# Patient Record
Sex: FEMALE | Race: OTHER | HISPANIC OR LATINO
[De-identification: names, ages, dates, MRNs, and addresses within clinical notes are randomized per-mention and may not be internally consistent; named-entity substitution may affect disease eponyms.]

---

## 2021-04-14 ENCOUNTER — TRANSCRIPTION ENCOUNTER (OUTPATIENT)
Age: 71
End: 2021-04-14

## 2021-04-17 ENCOUNTER — INPATIENT (INPATIENT)
Facility: HOSPITAL | Age: 71
LOS: 15 days | Discharge: HOME HEALTH | End: 2021-05-03
Attending: INTERNAL MEDICINE | Admitting: INTERNAL MEDICINE
Payer: COMMERCIAL

## 2021-04-17 VITALS
WEIGHT: 149.91 LBS | SYSTOLIC BLOOD PRESSURE: 116 MMHG | OXYGEN SATURATION: 96 % | TEMPERATURE: 97 F | HEIGHT: 64 IN | RESPIRATION RATE: 25 BRPM | DIASTOLIC BLOOD PRESSURE: 63 MMHG | HEART RATE: 90 BPM

## 2021-04-17 DIAGNOSIS — U07.1 COVID-19: ICD-10-CM

## 2021-04-17 DIAGNOSIS — R09.02 HYPOXEMIA: ICD-10-CM

## 2021-04-17 LAB
ALBUMIN SERPL ELPH-MCNC: 2.6 G/DL — LOW (ref 3.3–5)
ALBUMIN SERPL ELPH-MCNC: 2.6 G/DL — LOW (ref 3.3–5)
ALP SERPL-CCNC: 85 U/L — SIGNIFICANT CHANGE UP (ref 40–120)
ALP SERPL-CCNC: 88 U/L — SIGNIFICANT CHANGE UP (ref 40–120)
ALT FLD-CCNC: 24 U/L — SIGNIFICANT CHANGE UP (ref 12–78)
ALT FLD-CCNC: 26 U/L — SIGNIFICANT CHANGE UP (ref 12–78)
ANION GAP SERPL CALC-SCNC: 8 MMOL/L — SIGNIFICANT CHANGE UP (ref 5–17)
APTT BLD: 33.2 SEC — SIGNIFICANT CHANGE UP (ref 27.5–35.5)
AST SERPL-CCNC: 32 U/L — SIGNIFICANT CHANGE UP (ref 15–37)
AST SERPL-CCNC: 47 U/L — HIGH (ref 15–37)
BASOPHILS # BLD AUTO: 0.03 K/UL — SIGNIFICANT CHANGE UP (ref 0–0.2)
BASOPHILS NFR BLD AUTO: 0.4 % — SIGNIFICANT CHANGE UP (ref 0–2)
BILIRUB DIRECT SERPL-MCNC: 0.21 MG/DL — HIGH (ref 0.05–0.2)
BILIRUB INDIRECT FLD-MCNC: 0.3 MG/DL — SIGNIFICANT CHANGE UP (ref 0.2–1)
BILIRUB SERPL-MCNC: 0.5 MG/DL — SIGNIFICANT CHANGE UP (ref 0.2–1.2)
BILIRUB SERPL-MCNC: 0.5 MG/DL — SIGNIFICANT CHANGE UP (ref 0.2–1.2)
BUN SERPL-MCNC: 10 MG/DL — SIGNIFICANT CHANGE UP (ref 7–23)
CALCIUM SERPL-MCNC: 8.3 MG/DL — LOW (ref 8.5–10.1)
CHLORIDE SERPL-SCNC: 100 MMOL/L — SIGNIFICANT CHANGE UP (ref 96–108)
CK SERPL-CCNC: 66 U/L — SIGNIFICANT CHANGE UP (ref 26–192)
CK SERPL-CCNC: 89 U/L — SIGNIFICANT CHANGE UP (ref 26–192)
CO2 SERPL-SCNC: 27 MMOL/L — SIGNIFICANT CHANGE UP (ref 22–31)
CREAT SERPL-MCNC: 0.68 MG/DL — SIGNIFICANT CHANGE UP (ref 0.5–1.3)
CREAT SERPL-MCNC: 0.79 MG/DL — SIGNIFICANT CHANGE UP (ref 0.5–1.3)
D DIMER BLD IA.RAPID-MCNC: 222 NG/ML DDU — SIGNIFICANT CHANGE UP
EOSINOPHIL # BLD AUTO: 0 K/UL — SIGNIFICANT CHANGE UP (ref 0–0.5)
EOSINOPHIL NFR BLD AUTO: 0 % — SIGNIFICANT CHANGE UP (ref 0–6)
FIBRINOGEN PPP-MCNC: 882 MG/DL — HIGH (ref 290–520)
FLUAV AG NPH QL: SIGNIFICANT CHANGE UP
FLUBV AG NPH QL: SIGNIFICANT CHANGE UP
GLUCOSE SERPL-MCNC: 259 MG/DL — HIGH (ref 70–99)
HCT VFR BLD CALC: 34.1 % — LOW (ref 34.5–45)
HGB BLD-MCNC: 12 G/DL — SIGNIFICANT CHANGE UP (ref 11.5–15.5)
IMM GRANULOCYTES NFR BLD AUTO: 0.4 % — SIGNIFICANT CHANGE UP (ref 0–1.5)
INR BLD: 1.16 RATIO — SIGNIFICANT CHANGE UP (ref 0.88–1.16)
LACTATE SERPL-SCNC: 1.2 MMOL/L — SIGNIFICANT CHANGE UP (ref 0.7–2)
LACTATE SERPL-SCNC: 1.3 MMOL/L — SIGNIFICANT CHANGE UP (ref 0.7–2)
LYMPHOCYTES # BLD AUTO: 1.07 K/UL — SIGNIFICANT CHANGE UP (ref 1–3.3)
LYMPHOCYTES # BLD AUTO: 13 % — SIGNIFICANT CHANGE UP (ref 13–44)
MAGNESIUM SERPL-MCNC: 1.7 MG/DL — SIGNIFICANT CHANGE UP (ref 1.6–2.6)
MCHC RBC-ENTMCNC: 30.5 PG — SIGNIFICANT CHANGE UP (ref 27–34)
MCHC RBC-ENTMCNC: 35.2 GM/DL — SIGNIFICANT CHANGE UP (ref 32–36)
MCV RBC AUTO: 86.5 FL — SIGNIFICANT CHANGE UP (ref 80–100)
MONOCYTES # BLD AUTO: 0.67 K/UL — SIGNIFICANT CHANGE UP (ref 0–0.9)
MONOCYTES NFR BLD AUTO: 8.1 % — SIGNIFICANT CHANGE UP (ref 2–14)
NEUTROPHILS # BLD AUTO: 6.43 K/UL — SIGNIFICANT CHANGE UP (ref 1.8–7.4)
NEUTROPHILS NFR BLD AUTO: 78.1 % — HIGH (ref 43–77)
NRBC # BLD: 0 /100 WBCS — SIGNIFICANT CHANGE UP (ref 0–0)
NT-PROBNP SERPL-SCNC: 891 PG/ML — HIGH (ref 0–125)
PHOSPHATE SERPL-MCNC: 2.3 MG/DL — LOW (ref 2.5–4.5)
PLATELET # BLD AUTO: 216 K/UL — SIGNIFICANT CHANGE UP (ref 150–400)
POTASSIUM SERPL-MCNC: 4.1 MMOL/L — SIGNIFICANT CHANGE UP (ref 3.5–5.3)
POTASSIUM SERPL-SCNC: 4.1 MMOL/L — SIGNIFICANT CHANGE UP (ref 3.5–5.3)
PROT SERPL-MCNC: 6.7 GM/DL — SIGNIFICANT CHANGE UP (ref 6–8.3)
PROT SERPL-MCNC: 6.9 GM/DL — SIGNIFICANT CHANGE UP (ref 6–8.3)
PROTHROM AB SERPL-ACNC: 13.4 SEC — SIGNIFICANT CHANGE UP (ref 10.6–13.6)
RBC # BLD: 3.94 M/UL — SIGNIFICANT CHANGE UP (ref 3.8–5.2)
RBC # FLD: 11.6 % — SIGNIFICANT CHANGE UP (ref 10.3–14.5)
SARS-COV-2 RNA SPEC QL NAA+PROBE: DETECTED
SODIUM SERPL-SCNC: 135 MMOL/L — SIGNIFICANT CHANGE UP (ref 135–145)
TROPONIN I SERPL-MCNC: <.015 NG/ML — SIGNIFICANT CHANGE UP (ref 0.01–0.04)
TROPONIN I SERPL-MCNC: <.015 NG/ML — SIGNIFICANT CHANGE UP (ref 0.01–0.04)
WBC # BLD: 8.23 K/UL — SIGNIFICANT CHANGE UP (ref 3.8–10.5)
WBC # FLD AUTO: 8.23 K/UL — SIGNIFICANT CHANGE UP (ref 3.8–10.5)

## 2021-04-17 PROCEDURE — 71045 X-RAY EXAM CHEST 1 VIEW: CPT | Mod: 26

## 2021-04-17 PROCEDURE — 99285 EMERGENCY DEPT VISIT HI MDM: CPT

## 2021-04-17 PROCEDURE — 93010 ELECTROCARDIOGRAM REPORT: CPT

## 2021-04-17 RX ORDER — MAGNESIUM HYDROXIDE 400 MG/1
30 TABLET, CHEWABLE ORAL DAILY
Refills: 0 | Status: DISCONTINUED | OUTPATIENT
Start: 2021-04-17 | End: 2021-05-03

## 2021-04-17 RX ORDER — ACETAMINOPHEN 500 MG
650 TABLET ORAL EVERY 4 HOURS
Refills: 0 | Status: DISCONTINUED | OUTPATIENT
Start: 2021-04-17 | End: 2021-05-03

## 2021-04-17 RX ORDER — SENNA PLUS 8.6 MG/1
2 TABLET ORAL AT BEDTIME
Refills: 0 | Status: DISCONTINUED | OUTPATIENT
Start: 2021-04-17 | End: 2021-05-03

## 2021-04-17 RX ORDER — REMDESIVIR 5 MG/ML
100 INJECTION INTRAVENOUS EVERY 24 HOURS
Refills: 0 | Status: COMPLETED | OUTPATIENT
Start: 2021-04-18 | End: 2021-04-21

## 2021-04-17 RX ORDER — REMDESIVIR 5 MG/ML
INJECTION INTRAVENOUS
Refills: 0 | Status: COMPLETED | OUTPATIENT
Start: 2021-04-17 | End: 2021-04-21

## 2021-04-17 RX ORDER — DEXAMETHASONE 0.5 MG/5ML
6 ELIXIR ORAL ONCE
Refills: 0 | Status: COMPLETED | OUTPATIENT
Start: 2021-04-17 | End: 2021-04-17

## 2021-04-17 RX ORDER — DEXAMETHASONE 0.5 MG/5ML
6 ELIXIR ORAL DAILY
Refills: 0 | Status: DISCONTINUED | OUTPATIENT
Start: 2021-04-18 | End: 2021-04-19

## 2021-04-17 RX ORDER — ENOXAPARIN SODIUM 100 MG/ML
40 INJECTION SUBCUTANEOUS DAILY
Refills: 0 | Status: DISCONTINUED | OUTPATIENT
Start: 2021-04-17 | End: 2021-04-24

## 2021-04-17 RX ORDER — REMDESIVIR 5 MG/ML
200 INJECTION INTRAVENOUS EVERY 24 HOURS
Refills: 0 | Status: COMPLETED | OUTPATIENT
Start: 2021-04-17 | End: 2021-04-17

## 2021-04-17 RX ADMIN — SENNA PLUS 2 TABLET(S): 8.6 TABLET ORAL at 22:51

## 2021-04-17 RX ADMIN — Medication 6 MILLIGRAM(S): at 19:31

## 2021-04-17 RX ADMIN — REMDESIVIR 500 MILLIGRAM(S): 5 INJECTION INTRAVENOUS at 22:51

## 2021-04-17 RX ADMIN — Medication 650 MILLIGRAM(S): at 19:35

## 2021-04-17 RX ADMIN — Medication 650 MILLIGRAM(S): at 22:31

## 2021-04-17 NOTE — ED PROVIDER NOTE - OBJECTIVE STATEMENT
71 yo F no pmhx, who tested positive for COVID 3 days ago, presenting with complaints of shortness of breath and low oxygen levels noted on pulse oximeter. Patient states her daughter tested positive, unsure which day, and prompted her and her  to get tested. States she has been otherwise asymptomatic until today. Denies cp. No fevers.

## 2021-04-17 NOTE — H&P ADULT - NSHPLABSRESULTS_GEN_ALL_CORE
12.0                 135  | 27   | 10           8.23  >-----------< 216     ------------------------< 259                   34.1                 4.1  | 100  | 0.68                                         Ca 8.3   Mg x     Ph x      PT/INR - ( 17 Apr 2021 17:42 )   PT: 13.4 sec;   INR: 1.16 ratio         PTT - ( 17 Apr 2021 17:42 )  PTT:33.2 sec  CARDIAC MARKERS ( 17 Apr 2021 17:42 )  <.015 ng/mL / x     / 89 U/L / x     / x            d-dimer- normal  Ferritin- pending   chest xray- peripheral opacities  bilaterally more kalyan 1/3 lung affected.  ekg- sinus rhythm

## 2021-04-17 NOTE — ED PROVIDER NOTE - CLINICAL SUMMARY MEDICAL DECISION MAKING FREE TEXT BOX
69 yo F presenting with sob and hypoxia in setting of covid pna, 80->improved to 92% on 3L NC. labs, xr, ekg, decadron, tba

## 2021-04-17 NOTE — ED ADULT NURSE NOTE - OBJECTIVE STATEMENT
71 y/o female with PMH of T1DM. Presents to the Ed with c/c of SOB. PT + for covid on Wednesday and has been having SOB ever since.

## 2021-04-17 NOTE — ED PROVIDER NOTE - PHYSICAL EXAMINATION
VITALS: reviewed  GEN: NAD, A & O x 4  HEAD/EYES: NCAT, PERRL, EOMI, anicteric sclerae, no conjunctival pallor  ENT: mucus membranes moist, oropharynx WNL, trachea midline, no JVD  RESP: lungs CTA with equal breath sounds bilaterally, tachypneic, chest wall nontender and atraumatic  CV: heart with reg rhythm S1, S2, distal pulses intact and symmetric bilaterally  ABDOMEN: normoactive bowel sounds, soft, nondistended, nontender, no palpable masses  MSK: extremities atraumatic and nontender, no edema, no asymmetry.   SKIN: warm, dry, no rash, no bruising, no cyanosis. color appropriate for ethnicity  NEURO: alert, mentating appropriately, no facial asymmetry.   PSYCH: Affect appropriate

## 2021-04-17 NOTE — H&P ADULT - HISTORY OF PRESENT ILLNESS
71 yo F no pmhx, who tested positive for COVID 3 days ago, presenting with complaints of shortness of breath and low oxygen levels noted on pulse oximeter. Patient states her daughter tested positive, unsure which day, and prompted her and her  to get tested. States she has been otherwise asymptomatic until today. Denies cp. No fevers., however has fever her of 101F in the ED.

## 2021-04-17 NOTE — H&P ADULT - NSHPPHYSICALEXAM_GEN_ALL_CORE
General: A/ox 3, No acute Distress. febrile.  no distress on supplemental o2  skin : Normal  Head. Michelle. No lacerations  Neck: Supple, NO JVD  Cardiac: S1 S2, No M/R/G  Pulmonary: CTAP, Breathing unlabored, No Rhonchi/Rales/Wheezing  Abdomen: Soft, Non -tender, +BS x 4 quads  Neuro: A/o x 3, No focal deficits. Normal Motor and sensory exam  Vascular: Normal distal pulses.  Extremities: . No rashes. No edema  Decubiti ; None

## 2021-04-17 NOTE — CONSULT NOTE ADULT - SUBJECTIVE AND OBJECTIVE BOX
HPI:  71 y/o female with hx of DM, diet- controlled, otherwise in good health, admitted today with c/o increasing SOB and dry cough over the past few days with associated pleuritic- type cp with the coughing.  No c/o fevers or chills and patient had tested Positive for COVID-19 by PCR 3 days PTA after her  was dx'ed with COVID-19 as well.  O2 Sat's were being monitored at home and noted to decrease to 84% today and her niece who is an RN recommended that she come to the hospital for further care.        Allergies :    NKA    Intolerances - none        Social History:  Tobacco: negative  Alcohol: negative  Recent Travel: none  No Pets  Lives at home with her  and daughter    MEDICATIONS  (STANDING):  dexAMETHasone  Injectable 6 milliGRAM(s) IV Push daily  enoxaparin Injectable 40 milliGRAM(s) SubCutaneous daily  remdesivir  IVPB   IV Intermittent   senna 2 Tablet(s) Oral at bedtime    MEDICATIONS  (PRN):  acetaminophen   Tablet .. 650 milliGRAM(s) Oral every 4 hours PRN Temp greater or equal to 38.5C (101.3F)  magnesium hydroxide Suspension 30 milliLiter(s) Oral daily PRN Constipation      CBC Full  -  ( 17 Apr 2021 17:42 )  WBC Count : 8.23 K/uL  RBC Count : 3.94 M/uL  Hemoglobin : 12.0 g/dL  Hematocrit : 34.1 %  Platelet Count - Automated : 216 K/uL  Mean Cell Volume : 86.5 fl  Mean Cell Hemoglobin : 30.5 pg  Mean Cell Hemoglobin Concentration : 35.2 gm/dL  Auto Neutrophil # : 6.43 K/uL  Auto Lymphocyte # : 1.07 K/uL  Auto Monocyte # : 0.67 K/uL  Auto Eosinophil # : 0.00 K/uL  Auto Basophil # : 0.03 K/uL  Auto Neutrophil % : 78.1 %  Auto Lymphocyte % : 13.0 %  Auto Monocyte % : 8.1 %  Auto Eosinophil % : 0.0 %  Auto Basophil % : 0.4 %    04-17    135  |  100  |  10  ----------------------------<  259<H>  4.1   |  27  |  0.68    Ca    8.3<L>      17 Apr 2021 17:42    TPro  6.9  /  Alb  2.6<L>  /  TBili  0.5  /  DBili  x   /  AST  47<H>  /  ALT  26  /  AlkPhos  85  04-17    LIVER FUNCTIONS - ( 17 Apr 2021 17:42 )  Alb: 2.6 g/dL / Pro: 6.9 gm/dL / ALK PHOS: 85 U/L / ALT: 26 U/L / AST: 47 U/L / GGT: x           PT/INR - ( 17 Apr 2021 17:42 )   PT: 13.4 sec;   INR: 1.16 ratio         PTT - ( 17 Apr 2021 17:42 )  PTT:33.2 sec      CARDIAC MARKERS ( 17 Apr 2021 17:42 )  <.015 ng/mL / x     / 89 U/L / x     / x                  MICROBIOLOGY:                   Radiology:                   Vital Signs Last 24 Hrs  T(C): 38.9 (17 Apr 2021 19:29), Max: 38.9 (17 Apr 2021 19:29)  T(F): 102 (17 Apr 2021 19:29), Max: 102 (17 Apr 2021 19:29)  HR: 85 (17 Apr 2021 19:29) (85 - 90)  BP: 136/58 (17 Apr 2021 19:29) (116/63 - 136/58)  BP(mean): --  RR: 20 (17 Apr 2021 19:29) (20 - 25)  SpO2: 98% (17 Apr 2021 19:29) (96% - 98%)  Supplemental O2: on NC O2    PHYSICAL EXAM    General: 71 y/o female awake, alert, coughing frequently and with c/o mid-cp with coughing, using NC O2, appears uncomfortable  HEENT:   Neck:  Heart:  Lungs:   Abdomen: soft, BS+, nontender, no masses or HS-megaly detected  Back: no CVA or Spinal tenderness noted  Extremities: no edema LE's  Skin: warm, dry, no rash noted          I&O's Summary      Impression:    Suggestions:  Will therefore begin rx with Remdesivir and Decadron and follow-up temps and labs closely.  Monitor O2 Sat's.    Maintain Airborne/ Contact Isolation for COVID-19. HPI:  69 y/o female with hx of DM, diet- controlled, otherwise in good health, admitted today with c/o increasing SOB and dry cough over the past few days with associated pleuritic- type cp with the coughing.  No c/o fevers or chills and patient had tested Positive for COVID-19 by PCR 3 days PTA after her  was dx'ed with COVID-19 as well.  O2 Sat's were being monitored at home and noted to decrease to 84% today and her niece who is an RN recommended that she come to the hospital for further care.  In the ER patient was begun on NC O2 and CXR was done and results are pending.          Allergies :    NKA    Intolerances - none        Social History:  Tobacco: negative  Alcohol: negative  Recent Travel: none  No Pets  Lives at home with her  and daughter  Patient was born in [place] and came to the [place] when she was 5 y/o      MEDICATIONS  (STANDING):  dexAMETHasone  Injectable 6 milliGRAM(s) IV Push daily  enoxaparin Injectable 40 milliGRAM(s) SubCutaneous daily  remdesivir  IVPB   IV Intermittent   senna 2 Tablet(s) Oral at bedtime    MEDICATIONS  (PRN):  acetaminophen   Tablet .. 650 milliGRAM(s) Oral every 4 hours PRN Temp greater or equal to 38.5C (101.3F)  magnesium hydroxide Suspension 30 milliLiter(s) Oral daily PRN Constipation      LABS:  CBC Full  -  ( 17 Apr 2021 17:42 )  WBC Count : 8.23 K/uL  RBC Count : 3.94 M/uL  Hemoglobin : 12.0 g/dL  Hematocrit : 34.1 %  Platelet Count - Automated : 216 K/uL  Mean Cell Volume : 86.5 fl  Mean Cell Hemoglobin : 30.5 pg  Mean Cell Hemoglobin Concentration : 35.2 gm/dL  Auto Neutrophil # : 6.43 K/uL  Auto Lymphocyte # : 1.07 K/uL  Auto Monocyte # : 0.67 K/uL  Auto Eosinophil # : 0.00 K/uL  Auto Basophil # : 0.03 K/uL  Auto Neutrophil % : 78.1 %  Auto Lymphocyte % : 13.0 %  Auto Monocyte % : 8.1 %  Auto Eosinophil % : 0.0 %  Auto Basophil % : 0.4 %    04-17    135  |  100  |  10  ----------------------------<  259<H>  4.1   |  27  |  0.68    Ca    8.3<L>      17 Apr 2021 17:42    TPro  6.9  /  Alb  2.6<L>  /  TBili  0.5  /  DBili  x   /  AST  47<H>  /  ALT  26  /  AlkPhos  85  04-17    LIVER FUNCTIONS - ( 17 Apr 2021 17:42 )  Alb: 2.6 g/dL / Pro: 6.9 gm/dL / ALK PHOS: 85 U/L / ALT: 26 U/L / AST: 47 U/L / GGT: x           PT/INR - ( 17 Apr 2021 17:42 )   PT: 13.4 sec;   INR: 1.16 ratio       PTT - ( 17 Apr 2021 17:42 )  PTT:33.2 sec    CARDIAC MARKERS ( 17 Apr 2021 17:42 )  <.015 ng/mL / x     / 89 U/L / x     / x            MICROBIOLOGY:    Pending         Radiology:    CXR done - results pending       Vital Signs Last 24 Hrs  T(C): 38.9 (17 Apr 2021 19:29), Max: 38.9 (17 Apr 2021 19:29)  T(F): 102 (17 Apr 2021 19:29), Max: 102 (17 Apr 2021 19:29)  HR: 85 (17 Apr 2021 19:29) (85 - 90)  BP: 136/58 (17 Apr 2021 19:29) (116/63 - 136/58)  BP(mean): --  RR: 20 (17 Apr 2021 19:29) (20 - 25)  SpO2: 98% (17 Apr 2021 19:29) (96% - 98%)  Supplemental O2: on NC O2    PHYSICAL EXAM    General: 69 y/o female awake, alert, coughing frequently and with c/o mid-cp with coughing, using NC O2, appears uncomfortable but in NAD at present  HEENT: conj pink, sclerae anicteric, PERRLA, no oral lesions noted but mucosa slightly dry  Neck: supple, no nodes noted  Heart:  RR  Lungs: decreased BS throughout and with scattered wheeze noted bilaterally  Abdomen: soft, BS+, nontender, no masses or HS-megaly detected  Back: no CVA or Spinal tenderness noted  Extremities: no edema LE's  Skin: warm, dry, no rash noted      I&O's Summary :      Impression:       Suggestions:  Will therefore begin rx with Remdesivir and Decadron and follow-up temps and labs closely.  Monitor O2 Sat's.    Maintain Airborne/ Contact Isolation for COVID-19. HPI:  71 y/o female with hx of DM, diet- controlled, otherwise in good health, admitted today with c/o increasing SOB and dry cough over the past few days with associated pleuritic- type cp with the coughing.  No c/o fevers or chills and patient had tested Positive for COVID-19 by PCR 3 days PTA after her  was dx'ed with COVID-19 as well.  O2 Sat's were being monitored at home and noted to decrease to 84% today and her niece who is an RN recommended that she come to the hospital for further care.  In the ER patient was begun on NC O2 and CXR was done and results are pending.  Patient found to be afebrile in the ER and have WBC's WNL but with elevated Glucose.  COVID-19 PCR obtained and patient was begun on Decadron and Remdesivir.      Allergies :    NKA    Intolerances - none      Social History:  Tobacco: negative  Alcohol: negative  Recent Travel: none  No Pets  Lives at home with her  and daughter  Patient was born in [place] and came [place] the [place] when she was 3 y/o      MEDICATIONS  (STANDING):  dexAMETHasone  Injectable 6 milliGRAM(s) IV Push daily  enoxaparin Injectable 40 milliGRAM(s) SubCutaneous daily  remdesivir  IVPB   IV Intermittent   senna 2 Tablet(s) Oral at bedtime    MEDICATIONS  (PRN):  acetaminophen   Tablet .. 650 milliGRAM(s) Oral every 4 hours PRN Temp greater or equal to 38.5C (101.3F)  magnesium hydroxide Suspension 30 milliLiter(s) Oral daily PRN Constipation      LABS:  CBC Full  -  ( 17 Apr 2021 17:42 )  WBC Count : 8.23 K/uL  RBC Count : 3.94 M/uL  Hemoglobin : 12.0 g/dL  Hematocrit : 34.1 %  Platelet Count - Automated : 216 K/uL  Mean Cell Volume : 86.5 fl  Mean Cell Hemoglobin : 30.5 pg  Mean Cell Hemoglobin Concentration : 35.2 gm/dL  Auto Neutrophil # : 6.43 K/uL  Auto Lymphocyte # : 1.07 K/uL  Auto Monocyte # : 0.67 K/uL  Auto Eosinophil # : 0.00 K/uL  Auto Basophil # : 0.03 K/uL  Auto Neutrophil % : 78.1 %  Auto Lymphocyte % : 13.0 %  Auto Monocyte % : 8.1 %  Auto Eosinophil % : 0.0 %  Auto Basophil % : 0.4 %    04-17    135  |  100  |  10  ----------------------------<  259<H>  4.1   |  27  |  0.68    Ca    8.3<L>      17 Apr 2021 17:42    TPro  6.9  /  Alb  2.6<L>  /  TBili  0.5  /  DBili  x   /  AST  47<H>  /  ALT  26  /  AlkPhos  85  04-17    LIVER FUNCTIONS - ( 17 Apr 2021 17:42 )  Alb: 2.6 g/dL / Pro: 6.9 gm/dL / ALK PHOS: 85 U/L / ALT: 26 U/L / AST: 47 U/L / GGT: x           PT/INR - ( 17 Apr 2021 17:42 )   PT: 13.4 sec;   INR: 1.16 ratio       PTT - ( 17 Apr 2021 17:42 )  PTT:33.2 sec    CARDIAC MARKERS ( 17 Apr 2021 17:42 )  <.015 ng/mL / x     / 89 U/L / x     / x            MICROBIOLOGY:    Pending         Radiology:    CXR done - results pending       Vital Signs Last 24 Hrs  T(C): 38.9 (17 Apr 2021 19:29), Max: 38.9 (17 Apr 2021 19:29)  T(F): 102 (17 Apr 2021 19:29), Max: 102 (17 Apr 2021 19:29)  HR: 85 (17 Apr 2021 19:29) (85 - 90)  BP: 136/58 (17 Apr 2021 19:29) (116/63 - 136/58)  BP(mean): --  RR: 20 (17 Apr 2021 19:29) (20 - 25)  SpO2: 98% (17 Apr 2021 19:29) (96% - 98%)  Supplemental O2: on NC O2    PHYSICAL EXAM    General: 71 y/o female awake, alert,  sitting up on the stretcher in the ER now, coughing frequently and with c/o mid- sternal cp with coughing, using NC O2, pleasant and cooperative, but  appears               very uncomfortable and fatigued   HEENT: conj pink, sclerae anicteric, PERRLA, no oral lesions noted but mucosa slightly dry  Neck: supple, no nodes noted  Heart:  RR  Lungs: decreased BS throughout and with scattered wheeze noted bilaterally  Abdomen: soft, BS+, nontender, no masses or HS-megaly detected  Back: no CVA or Spinal tenderness noted  Extremities: no edema LE's  Skin: warm, dry, no rash noted      I&O's Summary :      Impression:  71 y/o female with hx of  DM reportedly diet-controlled but otherwise in good health, admitted today via the ER with c/o worsening SOB and cough associated with pleuritic-type cp over the prior few days after testing positive for COVID-19 by PCR a few days ago and noted by her niece who is an RN to be hypoxic with O2 Sat decreased to 84% at home.  W/u with CXR and labs were obtained along with COVID-19 PCR testing and patient was rx'ed with NC O2 at 3 Liters presently along with orders for Decadron and Remdesivir to begin.      Suggestions:  Will therefore continue rx with Remdesivir and Decadron and follow-up temps and labs closely. Monitor O2 Sat's closely and titrate O2 accordingly. Follow-up CXR results.  Maintain Airborne/ Contact Isolation for COVID-19.  DIscussed in detail with patient at the bedside.  Thanks, will follow with you.

## 2021-04-18 LAB
A1C WITH ESTIMATED AVERAGE GLUCOSE RESULT: 13 % — HIGH (ref 4–5.6)
ALBUMIN SERPL ELPH-MCNC: 2.6 G/DL — LOW (ref 3.3–5)
ALP SERPL-CCNC: 92 U/L — SIGNIFICANT CHANGE UP (ref 40–120)
ALT FLD-CCNC: 29 U/L — SIGNIFICANT CHANGE UP (ref 12–78)
ANION GAP SERPL CALC-SCNC: 8 MMOL/L — SIGNIFICANT CHANGE UP (ref 5–17)
APPEARANCE UR: CLEAR — SIGNIFICANT CHANGE UP
AST SERPL-CCNC: 36 U/L — SIGNIFICANT CHANGE UP (ref 15–37)
BASOPHILS # BLD AUTO: 0.01 K/UL — SIGNIFICANT CHANGE UP (ref 0–0.2)
BASOPHILS NFR BLD AUTO: 0.1 % — SIGNIFICANT CHANGE UP (ref 0–2)
BILIRUB DIRECT SERPL-MCNC: 0.14 MG/DL — SIGNIFICANT CHANGE UP (ref 0.05–0.2)
BILIRUB SERPL-MCNC: 0.3 MG/DL — SIGNIFICANT CHANGE UP (ref 0.2–1.2)
BILIRUB UR-MCNC: NEGATIVE — SIGNIFICANT CHANGE UP
BUN SERPL-MCNC: 17 MG/DL — SIGNIFICANT CHANGE UP (ref 7–23)
CALCIUM SERPL-MCNC: 9.1 MG/DL — SIGNIFICANT CHANGE UP (ref 8.5–10.1)
CHLORIDE SERPL-SCNC: 102 MMOL/L — SIGNIFICANT CHANGE UP (ref 96–108)
CO2 SERPL-SCNC: 28 MMOL/L — SIGNIFICANT CHANGE UP (ref 22–31)
COLOR SPEC: YELLOW — SIGNIFICANT CHANGE UP
COVID-19 SPIKE DOMAIN AB INTERP: NEGATIVE — SIGNIFICANT CHANGE UP
COVID-19 SPIKE DOMAIN ANTIBODY RESULT: 0.4 U/ML — SIGNIFICANT CHANGE UP
CREAT SERPL-MCNC: 0.81 MG/DL — SIGNIFICANT CHANGE UP (ref 0.5–1.3)
CRP SERPL-MCNC: 181 MG/L — HIGH
CRP SERPL-MCNC: 226 MG/L — HIGH
D DIMER BLD IA.RAPID-MCNC: 248 NG/ML DDU — HIGH
DIFF PNL FLD: ABNORMAL
EOSINOPHIL # BLD AUTO: 0 K/UL — SIGNIFICANT CHANGE UP (ref 0–0.5)
EOSINOPHIL NFR BLD AUTO: 0 % — SIGNIFICANT CHANGE UP (ref 0–6)
EPI CELLS # UR: SIGNIFICANT CHANGE UP
ERYTHROCYTE [SEDIMENTATION RATE] IN BLOOD: 97 MM/HR — HIGH (ref 0–20)
ESTIMATED AVERAGE GLUCOSE: 326 MG/DL — HIGH (ref 68–114)
FERRITIN SERPL-MCNC: 612 NG/ML — HIGH (ref 15–150)
GLUCOSE BLDC GLUCOMTR-MCNC: 334 MG/DL — HIGH (ref 70–99)
GLUCOSE BLDC GLUCOMTR-MCNC: 379 MG/DL — HIGH (ref 70–99)
GLUCOSE BLDC GLUCOMTR-MCNC: 458 MG/DL — CRITICAL HIGH (ref 70–99)
GLUCOSE BLDC GLUCOMTR-MCNC: 473 MG/DL — CRITICAL HIGH (ref 70–99)
GLUCOSE SERPL-MCNC: 389 MG/DL — HIGH (ref 70–99)
GLUCOSE UR QL: 1000 MG/DL
HCT VFR BLD CALC: 35.4 % — SIGNIFICANT CHANGE UP (ref 34.5–45)
HCV AB S/CO SERPL IA: 0.09 S/CO — SIGNIFICANT CHANGE UP (ref 0–0.99)
HCV AB SERPL-IMP: SIGNIFICANT CHANGE UP
HGB BLD-MCNC: 12.3 G/DL — SIGNIFICANT CHANGE UP (ref 11.5–15.5)
IMM GRANULOCYTES NFR BLD AUTO: 0.6 % — SIGNIFICANT CHANGE UP (ref 0–1.5)
KETONES UR-MCNC: ABNORMAL
LDH SERPL L TO P-CCNC: 415 U/L — HIGH (ref 50–242)
LDH SERPL L TO P-CCNC: 479 U/L — HIGH (ref 50–242)
LEUKOCYTE ESTERASE UR-ACNC: NEGATIVE — SIGNIFICANT CHANGE UP
LYMPHOCYTES # BLD AUTO: 0.81 K/UL — LOW (ref 1–3.3)
LYMPHOCYTES # BLD AUTO: 12.1 % — LOW (ref 13–44)
MCHC RBC-ENTMCNC: 30.4 PG — SIGNIFICANT CHANGE UP (ref 27–34)
MCHC RBC-ENTMCNC: 34.7 GM/DL — SIGNIFICANT CHANGE UP (ref 32–36)
MCV RBC AUTO: 87.6 FL — SIGNIFICANT CHANGE UP (ref 80–100)
MONOCYTES # BLD AUTO: 0.34 K/UL — SIGNIFICANT CHANGE UP (ref 0–0.9)
MONOCYTES NFR BLD AUTO: 5.1 % — SIGNIFICANT CHANGE UP (ref 2–14)
NEUTROPHILS # BLD AUTO: 5.48 K/UL — SIGNIFICANT CHANGE UP (ref 1.8–7.4)
NEUTROPHILS NFR BLD AUTO: 82.1 % — HIGH (ref 43–77)
NITRITE UR-MCNC: POSITIVE
NRBC # BLD: 0 /100 WBCS — SIGNIFICANT CHANGE UP (ref 0–0)
PH UR: 5 — SIGNIFICANT CHANGE UP (ref 5–8)
PLATELET # BLD AUTO: 254 K/UL — SIGNIFICANT CHANGE UP (ref 150–400)
POTASSIUM SERPL-MCNC: 4 MMOL/L — SIGNIFICANT CHANGE UP (ref 3.5–5.3)
POTASSIUM SERPL-SCNC: 4 MMOL/L — SIGNIFICANT CHANGE UP (ref 3.5–5.3)
PROCALCITONIN SERPL-MCNC: 0.08 NG/ML — SIGNIFICANT CHANGE UP (ref 0.02–0.1)
PROCALCITONIN SERPL-MCNC: 0.1 NG/ML — SIGNIFICANT CHANGE UP (ref 0.02–0.1)
PROCALCITONIN SERPL-MCNC: 0.11 NG/ML — HIGH (ref 0.02–0.1)
PROT SERPL-MCNC: 7.1 GM/DL — SIGNIFICANT CHANGE UP (ref 6–8.3)
PROT UR-MCNC: 15 MG/DL
RBC # BLD: 4.04 M/UL — SIGNIFICANT CHANGE UP (ref 3.8–5.2)
RBC # FLD: 11.7 % — SIGNIFICANT CHANGE UP (ref 10.3–14.5)
RBC CASTS # UR COMP ASSIST: SIGNIFICANT CHANGE UP /HPF (ref 0–4)
SARS-COV-2 IGG+IGM SERPL QL IA: 0.4 U/ML — SIGNIFICANT CHANGE UP
SARS-COV-2 IGG+IGM SERPL QL IA: NEGATIVE — SIGNIFICANT CHANGE UP
SODIUM SERPL-SCNC: 138 MMOL/L — SIGNIFICANT CHANGE UP (ref 135–145)
SP GR SPEC: 1.01 — SIGNIFICANT CHANGE UP (ref 1.01–1.02)
UROBILINOGEN FLD QL: NEGATIVE MG/DL — SIGNIFICANT CHANGE UP
WBC # BLD: 6.68 K/UL — SIGNIFICANT CHANGE UP (ref 3.8–10.5)
WBC # FLD AUTO: 6.68 K/UL — SIGNIFICANT CHANGE UP (ref 3.8–10.5)
WBC UR QL: SIGNIFICANT CHANGE UP

## 2021-04-18 PROCEDURE — 93010 ELECTROCARDIOGRAM REPORT: CPT

## 2021-04-18 RX ORDER — SODIUM CHLORIDE 9 MG/ML
1000 INJECTION, SOLUTION INTRAVENOUS
Refills: 0 | Status: DISCONTINUED | OUTPATIENT
Start: 2021-04-18 | End: 2021-05-03

## 2021-04-18 RX ORDER — DEXTROSE 50 % IN WATER 50 %
15 SYRINGE (ML) INTRAVENOUS ONCE
Refills: 0 | Status: DISCONTINUED | OUTPATIENT
Start: 2021-04-18 | End: 2021-05-03

## 2021-04-18 RX ORDER — INSULIN LISPRO 100/ML
VIAL (ML) SUBCUTANEOUS
Refills: 0 | Status: DISCONTINUED | OUTPATIENT
Start: 2021-04-18 | End: 2021-04-19

## 2021-04-18 RX ORDER — DEXTROSE 50 % IN WATER 50 %
12.5 SYRINGE (ML) INTRAVENOUS ONCE
Refills: 0 | Status: DISCONTINUED | OUTPATIENT
Start: 2021-04-18 | End: 2021-05-03

## 2021-04-18 RX ORDER — ZINC SULFATE TAB 220 MG (50 MG ZINC EQUIVALENT) 220 (50 ZN) MG
220 TAB ORAL DAILY
Refills: 0 | Status: DISCONTINUED | OUTPATIENT
Start: 2021-04-18 | End: 2021-05-03

## 2021-04-18 RX ORDER — DEXTROSE 50 % IN WATER 50 %
25 SYRINGE (ML) INTRAVENOUS ONCE
Refills: 0 | Status: DISCONTINUED | OUTPATIENT
Start: 2021-04-18 | End: 2021-05-03

## 2021-04-18 RX ORDER — ASCORBIC ACID 60 MG
500 TABLET,CHEWABLE ORAL DAILY
Refills: 0 | Status: DISCONTINUED | OUTPATIENT
Start: 2021-04-18 | End: 2021-05-03

## 2021-04-18 RX ORDER — GLUCAGON INJECTION, SOLUTION 0.5 MG/.1ML
1 INJECTION, SOLUTION SUBCUTANEOUS ONCE
Refills: 0 | Status: DISCONTINUED | OUTPATIENT
Start: 2021-04-18 | End: 2021-05-03

## 2021-04-18 RX ADMIN — SENNA PLUS 2 TABLET(S): 8.6 TABLET ORAL at 22:13

## 2021-04-18 RX ADMIN — ZINC SULFATE TAB 220 MG (50 MG ZINC EQUIVALENT) 220 MILLIGRAM(S): 220 (50 ZN) TAB at 12:12

## 2021-04-18 RX ADMIN — Medication 500 MILLIGRAM(S): at 12:12

## 2021-04-18 RX ADMIN — Medication 6 MILLIGRAM(S): at 06:51

## 2021-04-18 RX ADMIN — Medication 10: at 18:03

## 2021-04-18 RX ADMIN — ENOXAPARIN SODIUM 40 MILLIGRAM(S): 100 INJECTION SUBCUTANEOUS at 12:11

## 2021-04-18 RX ADMIN — Medication 12: at 12:06

## 2021-04-18 RX ADMIN — REMDESIVIR 500 MILLIGRAM(S): 5 INJECTION INTRAVENOUS at 22:13

## 2021-04-18 NOTE — PROGRESS NOTE ADULT - SUBJECTIVE AND OBJECTIVE BOX
Patient is a 70y old  Female who presents with a chief complaint of covid PNA with Hypoxia (17 Apr 2021 19:43)  blood suagar  is elvated  ? from dex   patient is in no distress   alert. no cough    INTERVAL HPI/OVERNIGHT EVENTS:  PAST MEDICAL & SURGICAL HISTORY:      MEDICATIONS  (STANDING):  dexAMETHasone  Injectable 6 milliGRAM(s) IV Push daily  dextrose 40% Gel 15 Gram(s) Oral once  dextrose 5%. 1000 milliLiter(s) (50 mL/Hr) IV Continuous <Continuous>  dextrose 5%. 1000 milliLiter(s) (100 mL/Hr) IV Continuous <Continuous>  dextrose 50% Injectable 25 Gram(s) IV Push once  dextrose 50% Injectable 12.5 Gram(s) IV Push once  dextrose 50% Injectable 25 Gram(s) IV Push once  enoxaparin Injectable 40 milliGRAM(s) SubCutaneous daily  glucagon  Injectable 1 milliGRAM(s) IntraMuscular once  insulin lispro (ADMELOG) corrective regimen sliding scale   SubCutaneous three times a day before meals  remdesivir  IVPB   IV Intermittent   remdesivir  IVPB 100 milliGRAM(s) IV Intermittent every 24 hours  senna 2 Tablet(s) Oral at bedtime    MEDICATIONS  (PRN):  acetaminophen   Tablet .. 650 milliGRAM(s) Oral every 4 hours PRN Temp greater or equal to 38.5C (101.3F)  magnesium hydroxide Suspension 30 milliLiter(s) Oral daily PRN Constipation      Allergies    Allergy Status Unknown    Intolerances        REVIEW OF SYSTEMS:  CONSTITUTIONAL: No fever, weight loss, or fatigue  EYES: No eye pain, visual disturbances, or discharge  ENMT:  No difficulty hearing, tinnitus, vertigo; No sinus or throat pain  NECK: No pain or stiffness  BREASTS: No pain, masses, or nipple discharge  RESPIRATORY: No cough, wheezing, chills or hemoptysis; No shortness of breath  CARDIOVASCULAR: No chest pain, palpitations, dizziness, or leg swelling  GASTROINTESTINAL: No abdominal or epigastric pain. No nausea, vomiting, or hematemesis; No diarrhea or constipation. No melena or hematochezia.  GENITOURINARY: No dysuria, frequency, hematuria, or incontinence  NEUROLOGICAL: No headaches, memory loss, loss of strength, numbness, or tremors  SKIN: No itching, burning, rashes, or lesions   LYMPH NODES: No enlarged glands  ENDOCRINE: No heat or cold intolerance; No hair loss  MUSCULOSKELETAL: No joint pain or swelling; No muscle, back, or extremity pain  PSYCHIATRIC: No depression, anxiety, mood swings, or difficulty sleeping  HEME/LYMPH: No easy bruising, or bleeding gums  ALLERY AND IMMUNOLOGIC: No hives or eczema    Vital Signs Last 24 Hrs  T(C): 36.7 (18 Apr 2021 08:45), Max: 38.9 (17 Apr 2021 19:29)  T(F): 98 (18 Apr 2021 08:45), Max: 102 (17 Apr 2021 19:29)  HR: 73 (18 Apr 2021 08:45) (72 - 90)  BP: 131/61 (18 Apr 2021 08:45) (116/63 - 136/58)  BP(mean): --  RR: 20 (18 Apr 2021 08:45) (20 - 25)  SpO2: 93% (18 Apr 2021 08:45) (93% - 98%)    PHYSICAL EXAM:  GENERAL: NAD, well-groomed, well-developed  HEAD:  Atraumatic, Normocephalic  EYES: EOMI, PERRLA, conjunctiva and sclera clear  ENMT: No tonsillar erythema, exudates, or enlargement; Moist mucous membranes, Good dentition, No lesions  NECK: Supple, No JVD, Normal thyroid  NERVOUS SYSTEM:  Alert & Oriented X3, Good concentration; Motor Strength 5/5 B/L upper and lower extremities; DTRs 2+ intact and symmetric  CHEST/LUNG: Clear to percussion bilaterally; coarse rhonch in chest  HEART: Regular rate and rhythm; No murmurs, rubs, or gallops  ABDOMEN: Soft, Nontender, Nondistended; Bowel sounds present  EXTREMITIES:  2+ Peripheral Pulses, No clubbing, cyanosis, or edema  LYMPH: No lymphadenopathy noted  SKIN: No rashes or lesions    LABS:                        12.3   6.68  )-----------( 254      ( 18 Apr 2021 07:08 )             35.4     04-18    138  |  102  |  17  ----------------------------<  389<H>  4.0   |  28  |  0.81    Ca    9.1      18 Apr 2021 07:08  Phos  2.3     04-17  Mg     1.7     04-17    TPro  7.1  /  Alb  2.6<L>  /  TBili  0.3  /  DBili  .14  /  AST  36  /  ALT  29  /  AlkPhos  92  04-18      PT/INR - ( 17 Apr 2021 17:42 )   PT: 13.4 sec;   INR: 1.16 ratio         PTT - ( 17 Apr 2021 17:42 )  PTT:33.2 sec    CAPILLARY BLOOD GLUCOSE          CARDIAC MARKERS ( 17 Apr 2021 21:55 )  <.015 ng/mL / x     / 66 U/L / x     / x      CARDIAC MARKERS ( 17 Apr 2021 17:42 )  <.015 ng/mL / x     / 89 U/L / x     / x                RADIOLOGY & ADDITIONAL TESTS:    Imaging Personally Reviewed:  [ ] YES  [ ] NO    Consultant(s) Notes Reviewed:  [x ] YES  [ ] NO    Care Discussed with Consultants/Other Providers [x ] YES  [ ] NO    Care discussed with family,         [ x ]   yes  [  ]  No    imp:    stable[ ]    unstable[  ]     improving [x   ]       unchanged  [  ]                Plans:  Continue present plans  [ x ] will add insulin coverage to cover elevated blood sugar. . continue Remdesivir and dex.. O2 supplementation as needed.. addd zinc and vitaminc               New consult [  ]   specialty  .......               order test[  ]    test name.                  Discharge Planning  [  ]

## 2021-04-19 LAB
A1C WITH ESTIMATED AVERAGE GLUCOSE RESULT: 13.5 % — HIGH (ref 4–5.6)
ALBUMIN SERPL ELPH-MCNC: 2.3 G/DL — LOW (ref 3.3–5)
ALP SERPL-CCNC: 82 U/L — SIGNIFICANT CHANGE UP (ref 40–120)
ALT FLD-CCNC: 26 U/L — SIGNIFICANT CHANGE UP (ref 12–78)
AST SERPL-CCNC: 31 U/L — SIGNIFICANT CHANGE UP (ref 15–37)
BILIRUB DIRECT SERPL-MCNC: 0.12 MG/DL — SIGNIFICANT CHANGE UP (ref 0.05–0.2)
BILIRUB INDIRECT FLD-MCNC: 0.2 MG/DL — SIGNIFICANT CHANGE UP (ref 0.2–1)
BILIRUB SERPL-MCNC: 0.3 MG/DL — SIGNIFICANT CHANGE UP (ref 0.2–1.2)
CREAT SERPL-MCNC: 0.62 MG/DL — SIGNIFICANT CHANGE UP (ref 0.5–1.3)
D DIMER BLD IA.RAPID-MCNC: 267 NG/ML DDU — HIGH
ESTIMATED AVERAGE GLUCOSE: 341 MG/DL — HIGH (ref 68–114)
GLUCOSE BLDC GLUCOMTR-MCNC: 234 MG/DL — HIGH (ref 70–99)
GLUCOSE BLDC GLUCOMTR-MCNC: 323 MG/DL — HIGH (ref 70–99)
GLUCOSE BLDC GLUCOMTR-MCNC: 334 MG/DL — HIGH (ref 70–99)
GLUCOSE BLDC GLUCOMTR-MCNC: 408 MG/DL — HIGH (ref 70–99)
HCT VFR BLD CALC: 33.2 % — LOW (ref 34.5–45)
HGB BLD-MCNC: 11.4 G/DL — LOW (ref 11.5–15.5)
MCHC RBC-ENTMCNC: 30.5 PG — SIGNIFICANT CHANGE UP (ref 27–34)
MCHC RBC-ENTMCNC: 34.3 GM/DL — SIGNIFICANT CHANGE UP (ref 32–36)
MCV RBC AUTO: 88.8 FL — SIGNIFICANT CHANGE UP (ref 80–100)
NRBC # BLD: 0 /100 WBCS — SIGNIFICANT CHANGE UP (ref 0–0)
PLATELET # BLD AUTO: 334 K/UL — SIGNIFICANT CHANGE UP (ref 150–400)
PROT SERPL-MCNC: 6.6 GM/DL — SIGNIFICANT CHANGE UP (ref 6–8.3)
RBC # BLD: 3.74 M/UL — LOW (ref 3.8–5.2)
RBC # FLD: 12 % — SIGNIFICANT CHANGE UP (ref 10.3–14.5)
WBC # BLD: 14.47 K/UL — HIGH (ref 3.8–10.5)
WBC # FLD AUTO: 14.47 K/UL — HIGH (ref 3.8–10.5)

## 2021-04-19 PROCEDURE — 71275 CT ANGIOGRAPHY CHEST: CPT | Mod: 26

## 2021-04-19 RX ORDER — INSULIN LISPRO 100/ML
VIAL (ML) SUBCUTANEOUS AT BEDTIME
Refills: 0 | Status: DISCONTINUED | OUTPATIENT
Start: 2021-04-19 | End: 2021-05-03

## 2021-04-19 RX ORDER — INSULIN GLARGINE 100 [IU]/ML
16 INJECTION, SOLUTION SUBCUTANEOUS AT BEDTIME
Refills: 0 | Status: DISCONTINUED | OUTPATIENT
Start: 2021-04-19 | End: 2021-04-23

## 2021-04-19 RX ORDER — INSULIN LISPRO 100/ML
VIAL (ML) SUBCUTANEOUS
Refills: 0 | Status: DISCONTINUED | OUTPATIENT
Start: 2021-04-19 | End: 2021-05-03

## 2021-04-19 RX ORDER — DEXAMETHASONE 0.5 MG/5ML
6 ELIXIR ORAL DAILY
Refills: 0 | Status: COMPLETED | OUTPATIENT
Start: 2021-04-20 | End: 2021-04-26

## 2021-04-19 RX ORDER — INSULIN LISPRO 100/ML
10 VIAL (ML) SUBCUTANEOUS ONCE
Refills: 0 | Status: COMPLETED | OUTPATIENT
Start: 2021-04-19 | End: 2021-04-19

## 2021-04-19 RX ADMIN — INSULIN GLARGINE 16 UNIT(S): 100 INJECTION, SOLUTION SUBCUTANEOUS at 21:22

## 2021-04-19 RX ADMIN — ZINC SULFATE TAB 220 MG (50 MG ZINC EQUIVALENT) 220 MILLIGRAM(S): 220 (50 ZN) TAB at 11:42

## 2021-04-19 RX ADMIN — Medication 8: at 11:41

## 2021-04-19 RX ADMIN — Medication 12: at 16:26

## 2021-04-19 RX ADMIN — Medication 500 MILLIGRAM(S): at 11:42

## 2021-04-19 RX ADMIN — ENOXAPARIN SODIUM 40 MILLIGRAM(S): 100 INJECTION SUBCUTANEOUS at 11:41

## 2021-04-19 RX ADMIN — REMDESIVIR 500 MILLIGRAM(S): 5 INJECTION INTRAVENOUS at 22:27

## 2021-04-19 RX ADMIN — SENNA PLUS 2 TABLET(S): 8.6 TABLET ORAL at 22:26

## 2021-04-19 RX ADMIN — Medication 10 UNIT(S): at 16:53

## 2021-04-19 RX ADMIN — Medication 8: at 08:05

## 2021-04-19 RX ADMIN — Medication 6 MILLIGRAM(S): at 05:35

## 2021-04-19 NOTE — PROGRESS NOTE ADULT - SUBJECTIVE AND OBJECTIVE BOX
Patient is a 70y old  Female who presents with a chief complaint of covid PNA with Hypoxia (2021 10:03)  patient  desaturates , when ambulatory.   at rest she is stable.   elevated BS due to Dex.   Vitals stable    INTERVAL HPI/OVERNIGHT EVENTS:  PAST MEDICAL & SURGICAL HISTORY:      MEDICATIONS  (STANDING):  ascorbic acid 500 milliGRAM(s) Oral daily  dexAMETHasone  Injectable 6 milliGRAM(s) IV Push daily  dextrose 40% Gel 15 Gram(s) Oral once  dextrose 5%. 1000 milliLiter(s) (50 mL/Hr) IV Continuous <Continuous>  dextrose 5%. 1000 milliLiter(s) (100 mL/Hr) IV Continuous <Continuous>  dextrose 50% Injectable 25 Gram(s) IV Push once  dextrose 50% Injectable 12.5 Gram(s) IV Push once  dextrose 50% Injectable 25 Gram(s) IV Push once  enoxaparin Injectable 40 milliGRAM(s) SubCutaneous daily  glucagon  Injectable 1 milliGRAM(s) IntraMuscular once  insulin lispro (ADMELOG) corrective regimen sliding scale   SubCutaneous three times a day before meals  remdesivir  IVPB   IV Intermittent   remdesivir  IVPB 100 milliGRAM(s) IV Intermittent every 24 hours  senna 2 Tablet(s) Oral at bedtime  zinc sulfate 220 milliGRAM(s) Oral daily    MEDICATIONS  (PRN):  acetaminophen   Tablet .. 650 milliGRAM(s) Oral every 4 hours PRN Temp greater or equal to 38.5C (101.3F)  magnesium hydroxide Suspension 30 milliLiter(s) Oral daily PRN Constipation      Allergies    Allergy Status Unknown    Intolerances        REVIEW OF SYSTEMS:  CONSTITUTIONAL: No fever, weight loss, or fatigue  EYES: No eye pain, visual disturbances, or discharge  ENMT:  No difficulty hearing, tinnitus, vertigo; No sinus or throat pain  NECK: No pain or stiffness  BREASTS: No pain, masses, or nipple discharge  RESPIRATORY: No cough, wheezing, chills or hemoptysis; No shortness of breath  CARDIOVASCULAR: No chest pain, palpitations, dizziness, or leg swelling  GASTROINTESTINAL: No abdominal or epigastric pain. No nausea, vomiting, or hematemesis; No diarrhea or constipation. No melena or hematochezia.  GENITOURINARY: No dysuria, frequency, hematuria, or incontinence  NEUROLOGICAL: No headaches, memory loss, loss of strength, numbness, or tremors  SKIN: No itching, burning, rashes, or lesions   LYMPH NODES: No enlarged glands  ENDOCRINE: No heat or cold intolerance; No hair loss  MUSCULOSKELETAL: No joint pain or swelling; No muscle, back, or extremity pain  PSYCHIATRIC: No depression, anxiety, mood swings, or difficulty sleeping  HEME/LYMPH: No easy bruising, or bleeding gums  ALLERY AND IMMUNOLOGIC: No hives or eczema    Vital Signs Last 24 Hrs  T(C): 36.4 (2021 11:14), Max: 37.1 (2021 16:43)  T(F): 97.6 (2021 11:14), Max: 98.8 (2021 16:43)  HR: 83 (2021 11:14) (74 - 83)  BP: 130/69 (2021 11:14) (124/77 - 146/72)  BP(mean): --  RR: 18 (2021 11:14) (18 - 18)  SpO2: 90% (2021 11:14) (89% - 94%)    PHYSICAL EXAM:  GENERAL: NAD, well-groomed, well-developed  HEAD:  Atraumatic, Normocephalic  EYES: EOMI, PERRLA, conjunctiva and sclera clear  ENMT: No tonsillar erythema, exudates, or enlargement; Moist mucous membranes, Good dentition, No lesions  NECK: Supple, No JVD, Normal thyroid  NERVOUS SYSTEM:  Alert & Oriented X3, Good concentration; Motor Strength 5/5 B/L upper and lower extremities; DTRs 2+ intact and symmetric  CHEST/LUNG: Clear to percussion bilaterally;  coarse rhonchi  HEART: Regular rate and rhythm; No murmurs, rubs, or gallops  ABDOMEN: Soft, Nontender, Nondistended; Bowel sounds present  EXTREMITIES:  2+ Peripheral Pulses, No clubbing, cyanosis, or edema  LYMPH: No lymphadenopathy noted  SKIN: No rashes or lesions    LABS:                        11.4   14.47 )-----------( 334      ( 2021 07:44 )             33.2     04-19    x   |  x   |  x   ----------------------------<  x   x    |  x   |  0.62    Ca    9.1      2021 07:08  Phos  2.3     04-17  Mg     1.7     -    TPro  6.6  /  Alb  2.3<L>  /  TBili  0.3  /  DBili  .12  /  AST  31  /  ALT  26  /  AlkPhos  82  04-19      PT/INR - ( 2021 17:42 )   PT: 13.4 sec;   INR: 1.16 ratio         PTT - ( 2021 17:42 )  PTT:33.2 sec  Urinalysis Basic - ( 2021 10:51 )    Color: Yellow / Appearance: Clear / S.015 / pH: x  Gluc: x / Ketone: Moderate  / Bili: Negative / Urobili: Negative mg/dL   Blood: x / Protein: 15 mg/dL / Nitrite: Positive   Leuk Esterase: Negative / RBC: 0-2 /HPF / WBC 0-2   Sq Epi: x / Non Sq Epi: Occasional / Bacteria: x      CAPILLARY BLOOD GLUCOSE      POCT Blood Glucose.: 323 mg/dL (2021 08:01)  POCT Blood Glucose.: 334 mg/dL (2021 21:13)  POCT Blood Glucose.: 379 mg/dL (2021 18:01)  POCT Blood Glucose.: 458 mg/dL (2021 12:05)  POCT Blood Glucose.: 473 mg/dL (2021 12:03)      CARDIAC MARKERS ( 2021 21:55 )  <.015 ng/mL / x     / 66 U/L / x     / x      CARDIAC MARKERS ( 2021 17:42 )  <.015 ng/mL / x     / 89 U/L / x     / x                RADIOLOGY & ADDITIONAL TESTS:    Imaging Personally Reviewed:  [ ] YES  [ ] NO    Consultant(s) Notes Reviewed:  [ ] YES  [ ] NO    Care Discussed with Consultants/Other Providers [x ] YES  [ ] NO    Care discussed with family,         [ x ]   yes  [  ]  No    imp:    stable[ ]    unstable[  ]     improving [  x ]       unchanged  [  ]                Plans:  Continue present plans  [x  ] will continue  dex and remdesivir,  o2 supplementation               New consult [  ]   specialty  .......               order test[  ]    test name.                  Discharge Planning  [  ]

## 2021-04-19 NOTE — PROGRESS NOTE ADULT - SUBJECTIVE AND OBJECTIVE BOX
TMAX - 98.8    On day # 3 Remdesivir / # 3 Decadron    Vital Signs Last 24 Hrs  T(C): 36.6 (2021 15:54), Max: 36.6 (2021 15:54)  T(F): 97.9 (2021 15:54), Max: 97.9 (2021 15:54)  HR: 80 (2021 15:54) (74 - 83)  BP: 130/75 (2021 15:54) (124/77 - 132/71)  BP(mean): --  RR: 17 (2021 15:54) (17 - 18)  SpO2: 92% (2021 15:54) (89% - 92%)  Supplemental O2:  on 3 Liters NC O2      Awake, alert, c/o SOB despite NC O2 in place now with O2 Sat's about 89 -90 % on 3 Liters.  Also c/o persistent dry cough and some chest discomfort secondary to the coughing.  Appetite is decreased but taking some po fluids.  Patient's  now admitted as well and sharing the room with her.  No c/o urinary difficulties and no N/V/D reported.    PHYSICAL EXAM  General:  69 y/o female awake, alert, sitting upright in bed and c/o SOB now and dry cough, with NC O2 in place on 3 Liters presently  HEENT:  conj pink, sclerae anicteric, PERRLA, no oral lesions noted but mucosa slightly dry  Neck:  supple, no nodes noted  Heart:  RR  Lungs:  decreased BS at the bases bilaterally but no wheezing noted  Abdomen:  soft, BS+, nontender to palpation, no masses or HS-megaly detected  Back: no CVA or Spinal tenderness elicited to palpation  Extremities:  no edema LE's                     no calf tenderness noted  Skin:  warm, dry, no rash noted      I&O's Summary :    2021 07:01  -  2021 07:00  --------------------------------------------------------  IN: 250 mL / OUT: 0 mL / NET: 250 mL    2021 07:01  -  2021 17:57  --------------------------------------------------------  IN: 360 mL / OUT: 0 mL / NET: 360 mL      LABS:  CBC Full  -  ( 2021 07:44 )  WBC Count : 14.47 K/uL  RBC Count : 3.74 M/uL  Hemoglobin : 11.4 g/dL  Hematocrit : 33.2 %  Platelet Count - Automated : 334 K/uL  Mean Cell Volume : 88.8 fl  Mean Cell Hemoglobin : 30.5 pg  Mean Cell Hemoglobin Concentration : 34.3 gm/dL  Auto Neutrophil # : x  Auto Lymphocyte # : x  Auto Monocyte # : x  Auto Eosinophil # : x  Auto Basophil # : x  Auto Neutrophil % : x  Auto Lymphocyte % : x  Auto Monocyte % : x  Auto Eosinophil % : x  Auto Basophil % : x    -19    x   |  x   |  x   ----------------------------<  x   x    |  x   |  0.62    Ca    9.1      2021 07:08  Phos  2.3     04-17  Mg     1.7     -17    TPro  6.6  /  Alb  2.3<L>  /  TBili  0.3  /  DBili  .12  /  AST  31  /  ALT  26  /  AlkPhos  82  04-19    LIVER FUNCTIONS - ( 2021 07:44 )  Alb: 2.3 g/dL / Pro: 6.6 gm/dL / ALK PHOS: 82 U/L / ALT: 26 U/L / AST: 31 U/L / GGT: x           Urinalysis Basic - ( 2021 10:51 )  Color: Yellow / Appearance: Clear / S.015 / pH: x  Gluc: x / Ketone: Moderate  / Bili: Negative / Urobili: Negative mg/dL   Blood: x / Protein: 15 mg/dL / Nitrite: Positive   Leuk Esterase: Negative / RBC: 0-2 /HPF / WBC 0-2   Sq Epi: x / Non Sq Epi: Occasional / Bacteria: x    CARDIAC MARKERS ( 2021 21:55 )  <.015 ng/mL / x     / 66 U/L / x     / x          Sedimentation Rate, Erythrocyte: 97 mm/hr ( @ 07:08)    C-Reactive Protein, Serum (21 @ 10:47)    C-Reactive Protein, Serum: 226: Please note: Reference range has changed due to units of measure change. mg/L    Procalcitonin, Serum (21 @ 10:59)    Procalcitonin, Serum: 0.10:     Procalcitonin, Serum (21 @ 10:47)    Procalcitonin, Serum: 0.11      Procalcitonin, Serum (21 @ 01:25)    Procalcitonin, Serum: 0.08:      A1C with Estimated Average Glucose (21 @ 10:40)    A1C with Estimated Average Glucose Result: 13.5: Method: Immunoassay       Reference Range                4.0-5.6%       High risk (prediabetic)        5.7-6.4%       Diabetic, diagnostic             >=6.5%       ADA diabetic treatment goal       <7.0%    Estimated Average Glucose: 341      Ferritin, Serum (21 @ 01:26)    Ferritin, Serum: 612 ng/mL    Lactate, Blood (21 @ 21:55)    Lactate, Blood: 1.2 mmol/L    Lactate, Blood (. @ 17:42)    Lactate, Blood: 1.3 mmol/L    Lactate Dehydrogenase, Serum (21 @ 10:59)    Lactate Dehydrogenase, Serum: 415 U/L    Lactate Dehydrogenase, Serum (21 @ 01:25)    Lactate Dehydrogenase, Serum: 479: Mild hemolysis. Results may be falsely elevated. U/L    D-Dimer Assay, Quantitative (21 @ 07:08)    D-Dimer Assay, Quantitative: 248 ng/mL DDU    D-Dimer Assay, Quantitative (21 @ 17:42)    D-Dimer Assay, Quantitative: 222 ng/mL DDU        MICROBIOLOGY:  COVID-19 Erwin Domain Antibody (21 @ 10:45)    COVID-19 Erwin Domain Antibody Result: 0.40: Roche ECLIA Total AB (AKILAH)  NOTE: This result index represents a total antibody measurement, which  includes IgG, IgA and IgM.  Measures Receptor Binding Domain of the Erwin Protein  Negative <= 0.79 U/mL  Positive  >= 0.80 U/mL U/mL    COVID-19 Erwin Domain AB Interp: Negative    Flu With COVID-19 By KELLI (21 @ 18:31)    SARS-CoV-2 Result: Detected: EUA/IVD    Influenza A Result: NotDetec: EUA/IVD    Influenza B Result: NotDetec: EUA/IVD      Radiology:                   < from: Xray Chest 1 View- PORTABLE-Urgent (21 @ 17:26) >  EXAM:  XR CHEST PORTABLE URGENT 1V                          PROCEDURE DATE:  2021      INTERPRETATION:  Chest one view    HISTORY: Shortness of breath, cough and fever    COMPARISON STUDY: None available    Frontal expiratory view of the chest shows the heart to be slightly enlarged in size. The lungs show bilateral patchy pulmonary infiltrates and there is no evidence of pneumothorax nor pleural effusion.    IMPRESSION:  Patchy infiltrates. Differential diagnosis includes Covid-19 pneumonia among other possibilities.        Impression:   69 y/o female with hx of         Suggestions:  Will therefore continue current rx with Remdesivir and Decadron and follow-up temps and labs closely.  Monitor O2 Sat's and repeat CXR in AM.  Maintain Airborne/ Contact Isolation for COVID-19 PNA. TMAX - 98.8    On day # 3 Remdesivir / # 3 Decadron    Vital Signs Last 24 Hrs  T(C): 36.6 (2021 15:54), Max: 36.6 (2021 15:54)  T(F): 97.9 (2021 15:54), Max: 97.9 (2021 15:54)  HR: 80 (2021 15:54) (74 - 83)  BP: 130/75 (2021 15:54) (124/77 - 132/71)  BP(mean): --  RR: 17 (2021 15:54) (17 - 18)  SpO2: 92% (2021 15:54) (89% - 92%)  Supplemental O2:  on 3 Liters NC O2      Awake, alert, c/o SOB despite NC O2 in place now with O2 Sat's about 89 -90 % on 3 Liters.  Also c/o persistent dry cough and some chest discomfort secondary to the coughing.  Appetite is decreased but taking some po fluids.  Patient's  now admitted as well and sharing the room with her.  No c/o urinary difficulties and no N/V/D reported.  S/p CT Angio of the Chest today - results pending.      PHYSICAL EXAM  General:  71 y/o female awake, alert, sitting upright in bed and c/o SOB now and dry cough, with NC O2 in place on 3 Liters presently  HEENT:  conj pink, sclerae anicteric, PERRLA, no oral lesions noted but mucosa slightly dry  Neck:  supple, no nodes noted  Heart:  RR  Lungs:  decreased BS at the bases bilaterally but no wheezing noted  Abdomen:  soft, BS+, nontender to palpation, no masses or HS-megaly detected  Back: no CVA or Spinal tenderness elicited to palpation  Extremities:  no edema LE's                     no calf tenderness noted  Skin:  warm, dry, no rash noted      I&O's Summary :    2021 07:01  -  2021 07:00  --------------------------------------------------------  IN: 250 mL / OUT: 0 mL / NET: 250 mL    2021 07:01  -  2021 17:57  --------------------------------------------------------  IN: 360 mL / OUT: 0 mL / NET: 360 mL      LABS:  CBC Full  -  ( 2021 07:44 )  WBC Count : 14.47 K/uL  RBC Count : 3.74 M/uL  Hemoglobin : 11.4 g/dL  Hematocrit : 33.2 %  Platelet Count - Automated : 334 K/uL  Mean Cell Volume : 88.8 fl  Mean Cell Hemoglobin : 30.5 pg  Mean Cell Hemoglobin Concentration : 34.3 gm/dL  Auto Neutrophil # : x  Auto Lymphocyte # : x  Auto Monocyte # : x  Auto Eosinophil # : x  Auto Basophil # : x  Auto Neutrophil % : x  Auto Lymphocyte % : x  Auto Monocyte % : x  Auto Eosinophil % : x  Auto Basophil % : x    -19    x   |  x   |  x   ----------------------------<  x   x    |  x   |  0.62    Ca    9.1      2021 07:08  Phos  2.3     04-17  Mg     1.7     -17    TPro  6.6  /  Alb  2.3<L>  /  TBili  0.3  /  DBili  .12  /  AST  31  /  ALT  26  /  AlkPhos  82  04-19    LIVER FUNCTIONS - ( 2021 07:44 )  Alb: 2.3 g/dL / Pro: 6.6 gm/dL / ALK PHOS: 82 U/L / ALT: 26 U/L / AST: 31 U/L / GGT: x           Urinalysis Basic - ( 2021 10:51 )  Color: Yellow / Appearance: Clear / S.015 / pH: x  Gluc: x / Ketone: Moderate  / Bili: Negative / Urobili: Negative mg/dL   Blood: x / Protein: 15 mg/dL / Nitrite: Positive   Leuk Esterase: Negative / RBC: 0-2 /HPF / WBC 0-2   Sq Epi: x / Non Sq Epi: Occasional / Bacteria: x    CARDIAC MARKERS ( 2021 21:55 )  <.015 ng/mL / x     / 66 U/L / x     / x          Sedimentation Rate, Erythrocyte: 97 mm/hr ( @ 07:08)    C-Reactive Protein, Serum (. @ 10:47)    C-Reactive Protein, Serum: 226: Please note: Reference range has changed due to units of measure change. mg/L    Procalcitonin, Serum (21 @ 10:59)    Procalcitonin, Serum: 0.10:     Procalcitonin, Serum (21 @ 10:47)    Procalcitonin, Serum: 0.11    Procalcitonin, Serum (21 @ 01:25)    Procalcitonin, Serum: 0.08:      A1C with Estimated Average Glucose (21 @ 10:40)    A1C with Estimated Average Glucose Result: 13.5: Method: Immunoassay       Reference Range                4.0-5.6%       High risk (prediabetic)        5.7-6.4%       Diabetic, diagnostic             >=6.5%       ADA diabetic treatment goal       <7.0%    Estimated Average Glucose: 341      Ferritin, Serum (21 @ 01:26)    Ferritin, Serum: 612 ng/mL    Lactate, Blood (21 @ 21:55)    Lactate, Blood: 1.2 mmol/L    Lactate, Blood (..21 @ 17:42)    Lactate, Blood: 1.3 mmol/L    Lactate Dehydrogenase, Serum (. @ 10:59)    Lactate Dehydrogenase, Serum: 415 U/L    Lactate Dehydrogenase, Serum (. @ 01:25)    Lactate Dehydrogenase, Serum: 479: Mild hemolysis. Results may be falsely elevated. U/L    D-Dimer Assay, Quantitative (21 @ 07:08)    D-Dimer Assay, Quantitative: 248 ng/mL DDU    D-Dimer Assay, Quantitative (..21 @ 17:42)    D-Dimer Assay, Quantitative: 222 ng/mL DDU        MICROBIOLOGY:  COVID-19 Erwin Domain Antibody (21 @ 10:45)    COVID-19 Erwin Domain Antibody Result: 0.40: Roche ECLIA Total AB (AKILAH)  NOTE: This result index represents a total antibody measurement, which  includes IgG, IgA and IgM.  Measures Receptor Binding Domain of the Erwin Protein  Negative <= 0.79 U/mL  Positive  >= 0.80 U/mL U/mL    COVID-19 Erwin Domain AB Interp: Negative    Flu With COVID-19 By KELLI (21 @ 18:31)    SARS-CoV-2 Result: Detected: EUA/IVD    Influenza A Result: NotDetec: EUA/IVD    Influenza B Result: NotDetec: EUA/IVD      Radiology:                   < from: Xray Chest 1 View- PORTABLE-Urgent (21 @ 17:26) >  EXAM:  XR CHEST PORTABLE URGENT 1V                          PROCEDURE DATE:  2021      INTERPRETATION:  Chest one view    HISTORY: Shortness of breath, cough and fever    COMPARISON STUDY: None available    Frontal expiratory view of the chest shows the heart to be slightly enlarged in size. The lungs show bilateral patchy pulmonary infiltrates and there is no evidence of pneumothorax nor pleural effusion.    IMPRESSION:  Patchy infiltrates. Differential diagnosis includes Covid-19 pneumonia among other possibilities.        Impression:   71 y/o female with hx of DM reportedly diet-controlled but otherwise in good health, admitted via the ER on 21 with c/o worsening SOB and cough associated with pleuritic-type cp over the prior few days after testing positive for COVID-19 by PCR a few days PTA and noted by her niece who is an RN to have decreased O2 Sat to 84% at home.  In the ER patient was begun on 3 Liters NC O2 and rx with Remdesivir and Decadron was begun.  Initial CXR revealed bilateral patchy pulmonary infiltrates consistent with dx of COVID-19 Multifocal PNA.   O2 Sat's have remained low and patient still with noted SOB on 3 Liters NC O2 now.  WBC's are elevated - likely secondary to steroids.  Further w/u with HGB A1C was found to be very elevated consistent with poorly-controlled DM at home and patient was begun on Insulin rx.  Noted ESR, CRP, and Ferritin are elevated.  CT Angio of the Chest was done today with results pending.    Suggestions:  Will therefore continue current rx with Remdesivir and Decadron and follow-up temps and labs closely.  Monitor O2 Sat's  and titrate  O2 as required.  Follow-up CT Angio of the Chest results.  Maintain Airborne/ Contact Isolation for COVID-19 Multifocal PNA.  Discussed with patient and her  ( who is now her roommate ) in detail at the bedside.

## 2021-04-20 LAB
ALBUMIN SERPL ELPH-MCNC: 2.4 G/DL — LOW (ref 3.3–5)
ALP SERPL-CCNC: 80 U/L — SIGNIFICANT CHANGE UP (ref 40–120)
ALT FLD-CCNC: 25 U/L — SIGNIFICANT CHANGE UP (ref 12–78)
ANION GAP SERPL CALC-SCNC: 7 MMOL/L — SIGNIFICANT CHANGE UP (ref 5–17)
AST SERPL-CCNC: 27 U/L — SIGNIFICANT CHANGE UP (ref 15–37)
BASOPHILS # BLD AUTO: 0.03 K/UL — SIGNIFICANT CHANGE UP (ref 0–0.2)
BASOPHILS NFR BLD AUTO: 0.2 % — SIGNIFICANT CHANGE UP (ref 0–2)
BILIRUB DIRECT SERPL-MCNC: 0.12 MG/DL — SIGNIFICANT CHANGE UP (ref 0.05–0.2)
BILIRUB INDIRECT FLD-MCNC: 0.3 MG/DL — SIGNIFICANT CHANGE UP (ref 0.2–1)
BILIRUB SERPL-MCNC: 0.4 MG/DL — SIGNIFICANT CHANGE UP (ref 0.2–1.2)
BUN SERPL-MCNC: 22 MG/DL — SIGNIFICANT CHANGE UP (ref 7–23)
CALCIUM SERPL-MCNC: 8.9 MG/DL — SIGNIFICANT CHANGE UP (ref 8.5–10.1)
CHLORIDE SERPL-SCNC: 106 MMOL/L — SIGNIFICANT CHANGE UP (ref 96–108)
CO2 SERPL-SCNC: 27 MMOL/L — SIGNIFICANT CHANGE UP (ref 22–31)
CREAT SERPL-MCNC: 0.59 MG/DL — SIGNIFICANT CHANGE UP (ref 0.5–1.3)
CRP SERPL-MCNC: 67 MG/L — HIGH
EOSINOPHIL # BLD AUTO: 0 K/UL — SIGNIFICANT CHANGE UP (ref 0–0.5)
EOSINOPHIL NFR BLD AUTO: 0 % — SIGNIFICANT CHANGE UP (ref 0–6)
ERYTHROCYTE [SEDIMENTATION RATE] IN BLOOD: 25 MM/HR — HIGH (ref 0–20)
FERRITIN SERPL-MCNC: 794 NG/ML — HIGH (ref 15–150)
GLUCOSE BLDC GLUCOMTR-MCNC: 267 MG/DL — HIGH (ref 70–99)
GLUCOSE BLDC GLUCOMTR-MCNC: 310 MG/DL — HIGH (ref 70–99)
GLUCOSE BLDC GLUCOMTR-MCNC: 310 MG/DL — HIGH (ref 70–99)
GLUCOSE BLDC GLUCOMTR-MCNC: 403 MG/DL — HIGH (ref 70–99)
GLUCOSE SERPL-MCNC: 280 MG/DL — HIGH (ref 70–99)
HCT VFR BLD CALC: 35.2 % — SIGNIFICANT CHANGE UP (ref 34.5–45)
HGB BLD-MCNC: 12 G/DL — SIGNIFICANT CHANGE UP (ref 11.5–15.5)
IMM GRANULOCYTES NFR BLD AUTO: 0.8 % — SIGNIFICANT CHANGE UP (ref 0–1.5)
LYMPHOCYTES # BLD AUTO: 0.99 K/UL — LOW (ref 1–3.3)
LYMPHOCYTES # BLD AUTO: 5.6 % — LOW (ref 13–44)
MCHC RBC-ENTMCNC: 30.5 PG — SIGNIFICANT CHANGE UP (ref 27–34)
MCHC RBC-ENTMCNC: 34.1 GM/DL — SIGNIFICANT CHANGE UP (ref 32–36)
MCV RBC AUTO: 89.3 FL — SIGNIFICANT CHANGE UP (ref 80–100)
MONOCYTES # BLD AUTO: 1.1 K/UL — HIGH (ref 0–0.9)
MONOCYTES NFR BLD AUTO: 6.2 % — SIGNIFICANT CHANGE UP (ref 2–14)
NEUTROPHILS # BLD AUTO: 15.45 K/UL — HIGH (ref 1.8–7.4)
NEUTROPHILS NFR BLD AUTO: 87.2 % — HIGH (ref 43–77)
NRBC # BLD: 0 /100 WBCS — SIGNIFICANT CHANGE UP (ref 0–0)
PLATELET # BLD AUTO: 382 K/UL — SIGNIFICANT CHANGE UP (ref 150–400)
POTASSIUM SERPL-MCNC: 4.1 MMOL/L — SIGNIFICANT CHANGE UP (ref 3.5–5.3)
POTASSIUM SERPL-SCNC: 4.1 MMOL/L — SIGNIFICANT CHANGE UP (ref 3.5–5.3)
PROCALCITONIN SERPL-MCNC: 0.1 NG/ML — SIGNIFICANT CHANGE UP (ref 0.02–0.1)
PROT SERPL-MCNC: 6.4 GM/DL — SIGNIFICANT CHANGE UP (ref 6–8.3)
RBC # BLD: 3.94 M/UL — SIGNIFICANT CHANGE UP (ref 3.8–5.2)
RBC # FLD: 12.1 % — SIGNIFICANT CHANGE UP (ref 10.3–14.5)
SODIUM SERPL-SCNC: 140 MMOL/L — SIGNIFICANT CHANGE UP (ref 135–145)
WBC # BLD: 17.72 K/UL — HIGH (ref 3.8–10.5)
WBC # FLD AUTO: 17.72 K/UL — HIGH (ref 3.8–10.5)

## 2021-04-20 RX ORDER — SODIUM CHLORIDE 9 MG/ML
1000 INJECTION INTRAMUSCULAR; INTRAVENOUS; SUBCUTANEOUS
Refills: 0 | Status: DISCONTINUED | OUTPATIENT
Start: 2021-04-20 | End: 2021-04-23

## 2021-04-20 RX ADMIN — Medication 500 MILLIGRAM(S): at 11:04

## 2021-04-20 RX ADMIN — Medication 6 MILLIGRAM(S): at 05:09

## 2021-04-20 RX ADMIN — ZINC SULFATE TAB 220 MG (50 MG ZINC EQUIVALENT) 220 MILLIGRAM(S): 220 (50 ZN) TAB at 11:04

## 2021-04-20 RX ADMIN — Medication 8: at 11:11

## 2021-04-20 RX ADMIN — Medication 6: at 08:17

## 2021-04-20 RX ADMIN — SODIUM CHLORIDE 65 MILLILITER(S): 9 INJECTION INTRAMUSCULAR; INTRAVENOUS; SUBCUTANEOUS at 08:49

## 2021-04-20 RX ADMIN — REMDESIVIR 500 MILLIGRAM(S): 5 INJECTION INTRAVENOUS at 22:05

## 2021-04-20 RX ADMIN — INSULIN GLARGINE 16 UNIT(S): 100 INJECTION, SOLUTION SUBCUTANEOUS at 21:56

## 2021-04-20 RX ADMIN — Medication 12: at 16:27

## 2021-04-20 RX ADMIN — Medication 4: at 21:57

## 2021-04-20 RX ADMIN — ENOXAPARIN SODIUM 40 MILLIGRAM(S): 100 INJECTION SUBCUTANEOUS at 11:04

## 2021-04-20 NOTE — DIETITIAN INITIAL EVALUATION ADULT. - OTHER INFO
Pt adm w/COVID pneumonia w/hypoxia. Met w/pt at bedside, pt reports good appetite and PO intake, consumes >75% meals. Pt denies N/V/D/C or difficulty chewing/swallowing. PTA pt reports having good appetite & PO intake. Pt was doing food-shopping & cooking herself. Pt reports not following any specific diets PTA. Pt was not checking finger sticks at home, gonzales snot follow an endocrinologist. Pt is aware of having DM & is able to name some CHO-containing foods (pasta, rolls, rice). Pt states she has been feeling more thirsty lately and drinks juice & soda along w/water. Pt reports UBW of ~130-132# w/no significant changes. Noted elevated FS, pt receiving Decadron. Discussed HbA1c results w/pt. Diet education on Diabetes & Nutrition & Meal Planning w/Plate method provided and compliance encouraged. Discussed CHO-containing foods, CHO portion sizes, and healthy meal/snack options. Encouraged pt to check FS, BG goals discussed. Encouraged pt to follow endocrinologist for improved DM care. Pt verbalized comprehension. Pt made aware RD remains available.

## 2021-04-20 NOTE — PROGRESS NOTE ADULT - SUBJECTIVE AND OBJECTIVE BOX
TMAX - 98.3    On day # 4  Remdesivir / # 4 Decadron    Vital Signs Last 24 Hrs  T(C): 36.6 (20 Apr 2021 15:54), Max: 36.8 (20 Apr 2021 05:34)  T(F): 97.9 (20 Apr 2021 15:54), Max: 98.3 (20 Apr 2021 05:34)  HR: 72 (20 Apr 2021 15:54) (66 - 73)  BP: 146/73 (20 Apr 2021 15:54) (130/82 - 146/73)  BP(mean): --  RR: 19 (20 Apr 2021 15:54) (17 - 19)  SpO2: 90% (20 Apr 2021 15:54) (85% - 91%)  Supplemental O2:  on NC O2 @ 5 Liters now      Awake, alert, claims to be feeling a little better today with some decreasing cough mostly dry but intermittently moist and no c/o cp or abdominal pain now.  Appetite is still decreased but trying to drink more liquids.  S/p CT Angio of the Chest and currently with IV fluids in progress.  Noted though with O2 Sat's from 85 - 92% now on 5 Liters NC O2.      PHYSICAL EXAM  General:  69 y/o female awake, alert, sitting upright in bed, pleasant and cooperative appears more comfortable today but with NC O2 in place on 5 Liters presently  HEENT:  conj pink, sclerae anicteric, PERRLA, no oral lesions noted but mucosa slightly dry  Neck:  supple, no nodes noted  Heart:  RR  Lungs:  decreased BS at the bases bilaterally but no wheezing noted  Abdomen:  soft, BS+, nontender to palpation, no masses or HS-megaly detected  Back: no CVA or Spinal tenderness elicited to palpation  Extremities:  no edema LE's                     no calf tenderness noted  Skin:  warm, dry, no rash noted      I&O's Summary :    19 Apr 2021 07:01  -  20 Apr 2021 07:00  --------------------------------------------------------  IN: 860 mL / OUT: 0 mL / NET: 860 mL    20 Apr 2021 07:01  -  20 Apr 2021 17:32  --------------------------------------------------------  IN: 480 mL / OUT: 0 mL / NET: 480 mL      LABS:  CBC Full  -  ( 20 Apr 2021 07:24 )  WBC Count : 17.72 K/uL  RBC Count : 3.94 M/uL  Hemoglobin : 12.0 g/dL  Hematocrit : 35.2 %  Platelet Count - Automated : 382 K/uL  Mean Cell Volume : 89.3 fl  Mean Cell Hemoglobin : 30.5 pg  Mean Cell Hemoglobin Concentration : 34.1 gm/dL  Auto Neutrophil # : 15.45 K/uL  Auto Lymphocyte # : 0.99 K/uL  Auto Monocyte # : 1.10 K/uL  Auto Eosinophil # : 0.00 K/uL  Auto Basophil # : 0.03 K/uL  Auto Neutrophil % : 87.2 %  Auto Lymphocyte % : 5.6 %  Auto Monocyte % : 6.2 %  Auto Eosinophil % : 0.0 %  Auto Basophil % : 0.2 %    04-20    140  |  106  |  22  ----------------------------<  280<H>  4.1   |  27  |  0.59    Ca    8.9      20 Apr 2021 07:24    TPro  6.4  /  Alb  2.4<L>  /  TBili  0.4  /  DBili  .12  /  AST  27  /  ALT  25  /  AlkPhos  80  04-20    LIVER FUNCTIONS - ( 20 Apr 2021 07:24 )  Alb: 2.4 g/dL / Pro: 6.4 gm/dL / ALK PHOS: 80 U/L / ALT: 25 U/L / AST: 27 U/L / GGT: x           Sedimentation Rate, Erythrocyte: 25 mm/hr (04-20 @ 07:24)  Sedimentation Rate, Erythrocyte: 97 mm/hr (04-18 @ 07:08)      C-Reactive Protein, Serum (04.18.21 @ 10:47)    C-Reactive Protein, Serum: 226: Please note: Reference range has changed due to units of measure change. mg/L    Procalcitonin, Serum (04.18.21 @ 10:59)    Procalcitonin, Serum: 0.10:     Procalcitonin, Serum (04.18.21 @ 10:47)    Procalcitonin, Serum: 0.11      Procalcitonin, Serum (04.18.21 @ 01:25)    Procalcitonin, Serum: 0.08:      A1C with Estimated Average Glucose (04.19.21 @ 10:40)    A1C with Estimated Average Glucose Result: 13.5: Method: Immunoassay       Reference Range                4.0-5.6%       High risk (prediabetic)        5.7-6.4%       Diabetic, diagnostic             >=6.5%       ADA diabetic treatment goal       <7.0%    Estimated Average Glucose: 341      Ferritin, Serum (04.18.21 @ 01:26)    Ferritin, Serum: 612 ng/mL    Lactate, Blood (04.17.21 @ 21:55)    Lactate, Blood: 1.2 mmol/L    Lactate, Blood (04.17.21 @ 17:42)    Lactate, Blood: 1.3 mmol/L    Lactate Dehydrogenase, Serum (04.18.21 @ 10:59)    Lactate Dehydrogenase, Serum: 415 U/L    Lactate Dehydrogenase, Serum (04.18.21 @ 01:25)    Lactate Dehydrogenase, Serum: 479: Mild hemolysis. Results may be falsely elevated. U/L    D-Dimer Assay, Quantitative (04.18.21 @ 07:08)    D-Dimer Assay, Quantitative: 248 ng/mL DDU    D-Dimer Assay, Quantitative (04.17.21 @ 17:42)    D-Dimer Assay, Quantitative: 222 ng/mL DDU      MICROBIOLOGY:    COVID-19 Erwin Domain Antibody (04.18.21 @ 10:45)    COVID-19 Erwin Domain Antibody Result: 0.40: Roche ECLIA Total AB (AKILAH)  NOTE: This result index represents a total antibody measurement, which  includes IgG, IgA and IgM.  Measures Receptor Binding Domain of the Erwin Protein  Negative <= 0.79 U/mL  Positive  >= 0.80 U/mL U/mL    COVID-19 Erwin Domain AB Interp: Negative    Flu With COVID-19 By KELLI (04.17.21 @ 18:31)    SARS-CoV-2 Result: Detected: EUA/IVD    Influenza A Result: NotDetec: EUA/IVD    Influenza B Result: NotDetec: EUA/IVD      RADIOLOGY:         < from: CT Angio Chest w/ IV Cont (04.19.21 @ 19:01) >  EXAM:  CT ANGIO CHEST (W)AW IC                        PROCEDURE DATE:  04/19/2021    INTERPRETATION:  CLINICAL INFORMATION: Shortness of breath. COVID positive.  COMPARISON: Chest x-ray of 4/17/2021.  CONTRAST/COMPLICATIONS:  IV Contrast: Omnipaque 350  60 cc administered   40 cc discarded  Oral Contrast: NONE  Complications: None reported at time of study completion    PROCEDURE:  CT Angiography of the Chest.  Sagittal and coronal reformats were performed as well as 3D (MIP) reconstructions.    FINDINGS:    LUNGS AND AIRWAYS: Patent central airways.  The lungs demonstrate bilateral multi focal groundglass opacities most prominent in the midlungs suggestive of atypical pneumonia such as that from COVID 19. Punctate calcified left lower lobe granuloma.  PLEURA: No pleural effusion.  MEDIASTINUM AND JUNIOR: No lymphadenopathy. Punctate left hilar calcification  VESSELS: There is atherosclerotic calcification of the aorta  HEART: Heart size is normal. No pericardial effusion.  CHEST WALL AND LOWER NECK: Within normal limits.  VISUALIZED UPPER ABDOMEN: Within normal limits.  BONES: Degenerative change of the spine    IMPRESSION: No evidence of pulmonary embolism  Bilateral multifocal infiltrates suggestive of pneumonia associated with COVID 19. Lines hilar calcifications and calcified left lower lobe granuloma consistent with old granulomatous disease                       < from: Xray Chest 1 View- PORTABLE-Urgent (04.17.21 @ 17:26) >  EXAM:  XR CHEST PORTABLE URGENT 1V                        PROCEDURE DATE:  04/17/2021    INTERPRETATION:  Chest one view  HISTORY: Shortness of breath, cough and fever  COMPARISON STUDY: None available    Frontal expiratory view of the chest shows the heart to be slightly enlarged in size. The lungs show bilateral patchy pulmonary infiltrates and there is no evidence of pneumothorax nor pleural effusion.    IMPRESSION:  Patchy infiltrates. Differential diagnosis includes Covid-19 pneumonia among other possibilities.        Impression:   69 y/o female with hx of DM      Now with CT Angio of the Chest confirming Bilateral Multifocal Infiltrates with no evidence pf PE along with evidence of old granulomatous disease involving the LLL.  Clinically with continued low O2 Sat's requirng 5 Liters of NC O2 now.      Suggestions:  Will therefore continue current rx with Remdesivir and Decadron and follow-up temps and labs closely.  Monitor O2 Sat's and repeat CXR in AM.  Maintain Airborne/ Contact Isolation for COVID-19 PNA. Discussed in detail with patient and her  ( her hospital roommate ) at the bedside.                       TMAX - 98.3    On day # 4  Remdesivir / # 4 Decadron    Vital Signs Last 24 Hrs  T(C): 36.6 (20 Apr 2021 15:54), Max: 36.8 (20 Apr 2021 05:34)  T(F): 97.9 (20 Apr 2021 15:54), Max: 98.3 (20 Apr 2021 05:34)  HR: 72 (20 Apr 2021 15:54) (66 - 73)  BP: 146/73 (20 Apr 2021 15:54) (130/82 - 146/73)  BP(mean): --  RR: 19 (20 Apr 2021 15:54) (17 - 19)  SpO2: 90% (20 Apr 2021 15:54) (85% - 91%)  Supplemental O2:  on NC O2 @ 5 Liters now      Awake, alert, claims to be feeling a little better today with some decreasing cough mostly dry but intermittently moist and no c/o cp or abdominal pain now.  Appetite is still decreased but trying to drink more liquids.  S/p CT Angio of the Chest and currently with IV fluids in progress.  Noted though with O2 Sat's from 85 - 92% now on 5 Liters NC O2.      PHYSICAL EXAM  General:  71 y/o female awake, alert, sitting upright in bed, pleasant and cooperative appears more comfortable today but with NC O2 in place on 5 Liters presently  HEENT:  conj pink, sclerae anicteric, PERRLA, no oral lesions noted but mucosa slightly dry  Neck:  supple, no nodes noted  Heart:  RR  Lungs:  decreased BS at the bases bilaterally but no wheezing noted  Abdomen:  soft, BS+, nontender to palpation, no masses or HS-megaly detected  Back: no CVA or Spinal tenderness elicited to palpation  Extremities:  no edema LE's                     no calf tenderness noted  Skin:  warm, dry, no rash noted      I&O's Summary :    19 Apr 2021 07:01  -  20 Apr 2021 07:00  --------------------------------------------------------  IN: 860 mL / OUT: 0 mL / NET: 860 mL    20 Apr 2021 07:01  -  20 Apr 2021 17:32  --------------------------------------------------------  IN: 480 mL / OUT: 0 mL / NET: 480 mL      LABS:  CBC Full  -  ( 20 Apr 2021 07:24 )  WBC Count : 17.72 K/uL  RBC Count : 3.94 M/uL  Hemoglobin : 12.0 g/dL  Hematocrit : 35.2 %  Platelet Count - Automated : 382 K/uL  Mean Cell Volume : 89.3 fl  Mean Cell Hemoglobin : 30.5 pg  Mean Cell Hemoglobin Concentration : 34.1 gm/dL  Auto Neutrophil # : 15.45 K/uL  Auto Lymphocyte # : 0.99 K/uL  Auto Monocyte # : 1.10 K/uL  Auto Eosinophil # : 0.00 K/uL  Auto Basophil # : 0.03 K/uL  Auto Neutrophil % : 87.2 %  Auto Lymphocyte % : 5.6 %  Auto Monocyte % : 6.2 %  Auto Eosinophil % : 0.0 %  Auto Basophil % : 0.2 %    04-20    140  |  106  |  22  ----------------------------<  280<H>  4.1   |  27  |  0.59    Ca    8.9      20 Apr 2021 07:24    TPro  6.4  /  Alb  2.4<L>  /  TBili  0.4  /  DBili  .12  /  AST  27  /  ALT  25  /  AlkPhos  80  04-20    LIVER FUNCTIONS - ( 20 Apr 2021 07:24 )  Alb: 2.4 g/dL / Pro: 6.4 gm/dL / ALK PHOS: 80 U/L / ALT: 25 U/L / AST: 27 U/L / GGT: x           Sedimentation Rate, Erythrocyte: 25 mm/hr (04-20 @ 07:24)  Sedimentation Rate, Erythrocyte: 97 mm/hr (04-18 @ 07:08)      C-Reactive Protein, Serum (04.18.21 @ 10:47)    C-Reactive Protein, Serum: 226: Please note: Reference range has changed due to units of measure change. mg/L    Procalcitonin, Serum (04.18.21 @ 10:59)    Procalcitonin, Serum: 0.10:     Procalcitonin, Serum (04.18.21 @ 10:47)    Procalcitonin, Serum: 0.11      Procalcitonin, Serum (04.18.21 @ 01:25)    Procalcitonin, Serum: 0.08:      A1C with Estimated Average Glucose (04.19.21 @ 10:40)    A1C with Estimated Average Glucose Result: 13.5: Method: Immunoassay       Reference Range                4.0-5.6%       High risk (prediabetic)        5.7-6.4%       Diabetic, diagnostic             >=6.5%       ADA diabetic treatment goal       <7.0%    Estimated Average Glucose: 341      Ferritin, Serum (04.18.21 @ 01:26)    Ferritin, Serum: 612 ng/mL    Lactate, Blood (04.17.21 @ 21:55)    Lactate, Blood: 1.2 mmol/L    Lactate, Blood (04.17.21 @ 17:42)    Lactate, Blood: 1.3 mmol/L    Lactate Dehydrogenase, Serum (04.18.21 @ 10:59)    Lactate Dehydrogenase, Serum: 415 U/L    Lactate Dehydrogenase, Serum (04.18.21 @ 01:25)    Lactate Dehydrogenase, Serum: 479: Mild hemolysis. Results may be falsely elevated. U/L    D-Dimer Assay, Quantitative (04.18.21 @ 07:08)    D-Dimer Assay, Quantitative: 248 ng/mL DDU    D-Dimer Assay, Quantitative (04.17.21 @ 17:42)    D-Dimer Assay, Quantitative: 222 ng/mL DDU      MICROBIOLOGY:    COVID-19 Erwin Domain Antibody (04.18.21 @ 10:45)    COVID-19 Erwin Domain Antibody Result: 0.40: Roche ECLIA Total AB (AKILAH)  NOTE: This result index represents a total antibody measurement, which  includes IgG, IgA and IgM.  Measures Receptor Binding Domain of the Erwin Protein  Negative <= 0.79 U/mL  Positive  >= 0.80 U/mL U/mL    COVID-19 Erwin Domain AB Interp: Negative    Flu With COVID-19 By KELLI (04.17.21 @ 18:31)    SARS-CoV-2 Result: Detected: EUA/IVD    Influenza A Result: NotDetec: EUA/IVD    Influenza B Result: NotDetec: EUA/IVD      RADIOLOGY:         < from: CT Angio Chest w/ IV Cont (04.19.21 @ 19:01) >  EXAM:  CT ANGIO CHEST (W)AW IC                        PROCEDURE DATE:  04/19/2021    INTERPRETATION:  CLINICAL INFORMATION: Shortness of breath. COVID positive.  COMPARISON: Chest x-ray of 4/17/2021.  CONTRAST/COMPLICATIONS:  IV Contrast: Omnipaque 350  60 cc administered   40 cc discarded  Oral Contrast: NONE  Complications: None reported at time of study completion    PROCEDURE:  CT Angiography of the Chest.  Sagittal and coronal reformats were performed as well as 3D (MIP) reconstructions.    FINDINGS:    LUNGS AND AIRWAYS: Patent central airways.  The lungs demonstrate bilateral multi focal groundglass opacities most prominent in the midlungs suggestive of atypical pneumonia such as that from COVID 19. Punctate calcified left lower lobe granuloma.  PLEURA: No pleural effusion.  MEDIASTINUM AND JUNIOR: No lymphadenopathy. Punctate left hilar calcification  VESSELS: There is atherosclerotic calcification of the aorta  HEART: Heart size is normal. No pericardial effusion.  CHEST WALL AND LOWER NECK: Within normal limits.  VISUALIZED UPPER ABDOMEN: Within normal limits.  BONES: Degenerative change of the spine    IMPRESSION: No evidence of pulmonary embolism  Bilateral multifocal infiltrates suggestive of pneumonia associated with COVID 19. Lines hilar calcifications and calcified left lower lobe granuloma consistent with old granulomatous disease                       < from: Xray Chest 1 View- PORTABLE-Urgent (04.17.21 @ 17:26) >  EXAM:  XR CHEST PORTABLE URGENT 1V                        PROCEDURE DATE:  04/17/2021    INTERPRETATION:  Chest one view  HISTORY: Shortness of breath, cough and fever  COMPARISON STUDY: None available    Frontal expiratory view of the chest shows the heart to be slightly enlarged in size. The lungs show bilateral patchy pulmonary infiltrates and there is no evidence of pneumothorax nor pleural effusion.    IMPRESSION:  Patchy infiltrates. Differential diagnosis includes Covid-19 pneumonia among other possibilities.        Impression:   71 y/o female with hx of DM reportedly diet- controlled but otherwise in good health, admitted via the ER on 4/17/21 with c/o worsening SOB and cough with associated pleuritic -type cp over the prior few days after testing positive for COVID-19 by PCR a few days PTA and noted by her niece who is an RN to have decreased O2 Sat to 84% at home.  In the ER patient was begun on 3 Liters NC O2 and rx with Remdesivir and Decadron was begun.  Initial CXR revealed bilateral patchy pulmonary infiltrates consistent with dx of COVID-19 Multifocal PNA.   CT Angio of the Chest was done on 4/19/21 and confirmed  Bilateral Multifocal Infiltrates but no evidence of PE, but with evidence of old granulomatous disease involving the LLL. O2 Sat's have remained low and patient requiring increased O2 presently at 5 Liters but still with noted SOB.  WBC's are elevated - likely secondary to steroids.  Further w/u with HGB A1C was found to be very elevated consistent with poorly-controlled DM at home and patient was begun on Insulin rx.      Suggestions:  Will therefore continue current rx with Remdesivir and Decadron and follow-up temps and labs closely.  Monitor O2 Sat's.  Maintain Airborne/ Contact Isolation for COVID-19  Multifocal PNA. Discussed in detail with patient and her  ( her hospital roommate ) at the bedside.

## 2021-04-20 NOTE — DIETITIAN INITIAL EVALUATION ADULT. - PERTINENT LABORATORY DATA
04-20 Na140 mmol/L Glu 280 mg/dL<H> K+ 4.1 mmol/L Cr  0.59 mg/dL BUN 22 mg/dL 04-17 Phos 2.3 mg/dL<L> 04-20 Alb 2.4 g/dL<L>    04-19-21 @ 10:40A1C 13.5  04-18-21 @ 10:45A1C 13.0    POCT Glucose (4/20) 267, (4/19) 234, 408, 334, 323.

## 2021-04-20 NOTE — DIETITIAN INITIAL EVALUATION ADULT. - PERTINENT MEDS FT
MEDICATIONS  (STANDING):  ascorbic acid 500 milliGRAM(s) Oral daily  dexAMETHasone  Injectable 6 milliGRAM(s) IV Push daily  dextrose 40% Gel 15 Gram(s) Oral once  dextrose 5%. 1000 milliLiter(s) (50 mL/Hr) IV Continuous <Continuous>  dextrose 5%. 1000 milliLiter(s) (100 mL/Hr) IV Continuous <Continuous>  dextrose 50% Injectable 25 Gram(s) IV Push once  dextrose 50% Injectable 12.5 Gram(s) IV Push once  dextrose 50% Injectable 25 Gram(s) IV Push once  enoxaparin Injectable 40 milliGRAM(s) SubCutaneous daily  glucagon  Injectable 1 milliGRAM(s) IntraMuscular once  insulin glargine Injectable (LANTUS) 16 Unit(s) SubCutaneous at bedtime  insulin lispro (ADMELOG) corrective regimen sliding scale   SubCutaneous three times a day before meals  insulin lispro (ADMELOG) corrective regimen sliding scale   SubCutaneous at bedtime  remdesivir  IVPB   IV Intermittent   remdesivir  IVPB 100 milliGRAM(s) IV Intermittent every 24 hours  senna 2 Tablet(s) Oral at bedtime  sodium chloride 0.9%. 1000 milliLiter(s) (65 mL/Hr) IV Continuous <Continuous>  zinc sulfate 220 milliGRAM(s) Oral daily    MEDICATIONS  (PRN):  acetaminophen   Tablet .. 650 milliGRAM(s) Oral every 4 hours PRN Temp greater or equal to 38.5C (101.3F)  magnesium hydroxide Suspension 30 milliLiter(s) Oral daily PRN Constipation

## 2021-04-20 NOTE — DIETITIAN INITIAL EVALUATION ADULT. - ETIOLOGY
physiological causes (T2DM) requiring careful timing & consistency of CHO; food & nutrition-related knowledge deficit

## 2021-04-20 NOTE — PROGRESS NOTE ADULT - SUBJECTIVE AND OBJECTIVE BOX
Patient is a 70y old  Female who presents with a chief complaint of covid PNA with Hypoxia (2021 17:57)  Hypoxic, needs supplemental o2 , now on 4L by NC.   vitals otherwise stable  Patient alert. no acute distress   labs reviewed. liver enzymes stable    INTERVAL HPI/OVERNIGHT EVENTS:  PAST MEDICAL & SURGICAL HISTORY:      MEDICATIONS  (STANDING):  ascorbic acid 500 milliGRAM(s) Oral daily  dexAMETHasone  Injectable 6 milliGRAM(s) IV Push daily  dextrose 40% Gel 15 Gram(s) Oral once  dextrose 5%. 1000 milliLiter(s) (50 mL/Hr) IV Continuous <Continuous>  dextrose 5%. 1000 milliLiter(s) (100 mL/Hr) IV Continuous <Continuous>  dextrose 50% Injectable 25 Gram(s) IV Push once  dextrose 50% Injectable 12.5 Gram(s) IV Push once  dextrose 50% Injectable 25 Gram(s) IV Push once  enoxaparin Injectable 40 milliGRAM(s) SubCutaneous daily  glucagon  Injectable 1 milliGRAM(s) IntraMuscular once  insulin glargine Injectable (LANTUS) 16 Unit(s) SubCutaneous at bedtime  insulin lispro (ADMELOG) corrective regimen sliding scale   SubCutaneous three times a day before meals  insulin lispro (ADMELOG) corrective regimen sliding scale   SubCutaneous at bedtime  remdesivir  IVPB   IV Intermittent   remdesivir  IVPB 100 milliGRAM(s) IV Intermittent every 24 hours  senna 2 Tablet(s) Oral at bedtime  sodium chloride 0.9%. 1000 milliLiter(s) (65 mL/Hr) IV Continuous <Continuous>  zinc sulfate 220 milliGRAM(s) Oral daily    MEDICATIONS  (PRN):  acetaminophen   Tablet .. 650 milliGRAM(s) Oral every 4 hours PRN Temp greater or equal to 38.5C (101.3F)  magnesium hydroxide Suspension 30 milliLiter(s) Oral daily PRN Constipation      Allergies    Allergy Status Unknown    Intolerances        REVIEW OF SYSTEMS:  CONSTITUTIONAL: No fever, weight loss, or fatigue  EYES: No eye pain, visual disturbances, or discharge  ENMT:  No difficulty hearing, tinnitus, vertigo; No sinus or throat pain  NECK: No pain or stiffness  BREASTS: No pain, masses, or nipple discharge  RESPIRATORY: No cough, wheezing, chills or hemoptysis; No shortness of breath  CARDIOVASCULAR: No chest pain, palpitations, dizziness, or leg swelling  GASTROINTESTINAL: No abdominal or epigastric pain. No nausea, vomiting, or hematemesis; No diarrhea or constipation. No melena or hematochezia.  GENITOURINARY: No dysuria, frequency, hematuria, or incontinence  NEUROLOGICAL: No headaches, memory loss, loss of strength, numbness, or tremors  SKIN: No itching, burning, rashes, or lesions   LYMPH NODES: No enlarged glands  ENDOCRINE: No heat or cold intolerance; No hair loss  MUSCULOSKELETAL: No joint pain or swelling; No muscle, back, or extremity pain  PSYCHIATRIC: No depression, anxiety, mood swings, or difficulty sleeping  HEME/LYMPH: No easy bruising, or bleeding gums  ALLERY AND IMMUNOLOGIC: No hives or eczema    Vital Signs Last 24 Hrs  T(C): 36.6 (2021 10:06), Max: 36.8 (2021 05:34)  T(F): 97.9 (2021 10:06), Max: 98.3 (2021 05:34)  HR: 73 (2021 10:06) (66 - 83)  BP: 145/65 (2021 10:06) (130/69 - 145/65)  BP(mean): --  RR: 18 (2021 10:06) (17 - 18)  SpO2: 90% (2021 10:06) (85% - 92%)    PHYSICAL EXAM:  GENERAL: NAD, well-groomed, well-developed  HEAD:  Atraumatic, Normocephalic  EYES: EOMI, PERRLA, conjunctiva and sclera clear  ENMT: No tonsillar erythema, exudates, or enlargement; Moist mucous membranes, Good dentition, No lesions  NECK: Supple, No JVD, Normal thyroid  NERVOUS SYSTEM:  Alert & Oriented X3, Good concentration; Motor Strength 5/5 B/L upper and lower extremities; DTRs 2+ intact and symmetric  CHEST/LUNG: Clear to percussion bilaterally;Bilateral rales  HEART: Regular rate and rhythm; No murmurs, rubs, or gallops  ABDOMEN: Soft, Nontender, Nondistended; Bowel sounds present  EXTREMITIES:  2+ Peripheral Pulses, No clubbing, cyanosis, or edema  LYMPH: No lymphadenopathy noted  SKIN: No rashes or lesions    LABS:                        12.0   17.72 )-----------( 382      ( 2021 07:24 )             35.2     04-20    140  |  106  |  22  ----------------------------<  280<H>  4.1   |  27  |  0.59    Ca    8.9      2021 07:24    TPro  6.4  /  Alb  2.4<L>  /  TBili  0.4  /  DBili  .12  /  AST  27  /  ALT  25  /  AlkPhos  80  04-20        Urinalysis Basic - ( 2021 10:51 )    Color: Yellow / Appearance: Clear / S.015 / pH: x  Gluc: x / Ketone: Moderate  / Bili: Negative / Urobili: Negative mg/dL   Blood: x / Protein: 15 mg/dL / Nitrite: Positive   Leuk Esterase: Negative / RBC: 0-2 /HPF / WBC 0-2   Sq Epi: x / Non Sq Epi: Occasional / Bacteria: x      CAPILLARY BLOOD GLUCOSE      POCT Blood Glucose.: 267 mg/dL (2021 08:02)  POCT Blood Glucose.: 234 mg/dL (2021 21:14)  POCT Blood Glucose.: 408 mg/dL (2021 16:06)  POCT Blood Glucose.: 334 mg/dL (2021 11:30)                RADIOLOGY & ADDITIONAL TESTS:    Imaging Personally Reviewed:  [ ] YES  [ ] NO    Consultant(s) Notes Reviewed:  [x ] YES  [ ] NO    Care Discussed with Consultants/Other Providers [x ] YES  [ ] NO    Care discussed with family,         [ x ]   yes  [  ]  No    imp:    stable[ ]    unstable[  ]     improving [   ]       unchanged  [ x ]                Plans:  Continue present plans  [x  ] continue o2 supplementation, continuous Pulse oximetry,  Monitor clinically, ID and PUL follow up.               New consult [  ]   specialty  .......               order test[  ]    test name.                  Discharge Planning  [  ]

## 2021-04-21 LAB
ALBUMIN SERPL ELPH-MCNC: 2.2 G/DL — LOW (ref 3.3–5)
ALP SERPL-CCNC: 83 U/L — SIGNIFICANT CHANGE UP (ref 40–120)
ALT FLD-CCNC: 24 U/L — SIGNIFICANT CHANGE UP (ref 12–78)
ANION GAP SERPL CALC-SCNC: 6 MMOL/L — SIGNIFICANT CHANGE UP (ref 5–17)
AST SERPL-CCNC: 26 U/L — SIGNIFICANT CHANGE UP (ref 15–37)
BASOPHILS # BLD AUTO: 0.03 K/UL — SIGNIFICANT CHANGE UP (ref 0–0.2)
BASOPHILS NFR BLD AUTO: 0.2 % — SIGNIFICANT CHANGE UP (ref 0–2)
BILIRUB DIRECT SERPL-MCNC: 0.09 MG/DL — SIGNIFICANT CHANGE UP (ref 0.05–0.2)
BILIRUB INDIRECT FLD-MCNC: 0.3 MG/DL — SIGNIFICANT CHANGE UP (ref 0.2–1)
BILIRUB SERPL-MCNC: 0.4 MG/DL — SIGNIFICANT CHANGE UP (ref 0.2–1.2)
BUN SERPL-MCNC: 20 MG/DL — SIGNIFICANT CHANGE UP (ref 7–23)
CALCIUM SERPL-MCNC: 8.1 MG/DL — LOW (ref 8.5–10.1)
CHLORIDE SERPL-SCNC: 111 MMOL/L — HIGH (ref 96–108)
CO2 SERPL-SCNC: 28 MMOL/L — SIGNIFICANT CHANGE UP (ref 22–31)
CREAT SERPL-MCNC: 0.54 MG/DL — SIGNIFICANT CHANGE UP (ref 0.5–1.3)
EOSINOPHIL # BLD AUTO: 0 K/UL — SIGNIFICANT CHANGE UP (ref 0–0.5)
EOSINOPHIL NFR BLD AUTO: 0 % — SIGNIFICANT CHANGE UP (ref 0–6)
GLUCOSE BLDC GLUCOMTR-MCNC: 150 MG/DL — HIGH (ref 70–99)
GLUCOSE BLDC GLUCOMTR-MCNC: 162 MG/DL — HIGH (ref 70–99)
GLUCOSE BLDC GLUCOMTR-MCNC: 193 MG/DL — HIGH (ref 70–99)
GLUCOSE BLDC GLUCOMTR-MCNC: 243 MG/DL — HIGH (ref 70–99)
GLUCOSE SERPL-MCNC: 138 MG/DL — HIGH (ref 70–99)
HCT VFR BLD CALC: 34.1 % — LOW (ref 34.5–45)
HGB BLD-MCNC: 11.5 G/DL — SIGNIFICANT CHANGE UP (ref 11.5–15.5)
IMM GRANULOCYTES NFR BLD AUTO: 1.3 % — SIGNIFICANT CHANGE UP (ref 0–1.5)
LYMPHOCYTES # BLD AUTO: 0.81 K/UL — LOW (ref 1–3.3)
LYMPHOCYTES # BLD AUTO: 4.2 % — LOW (ref 13–44)
MCHC RBC-ENTMCNC: 30.3 PG — SIGNIFICANT CHANGE UP (ref 27–34)
MCHC RBC-ENTMCNC: 33.7 GM/DL — SIGNIFICANT CHANGE UP (ref 32–36)
MCV RBC AUTO: 90 FL — SIGNIFICANT CHANGE UP (ref 80–100)
MONOCYTES # BLD AUTO: 0.86 K/UL — SIGNIFICANT CHANGE UP (ref 0–0.9)
MONOCYTES NFR BLD AUTO: 4.4 % — SIGNIFICANT CHANGE UP (ref 2–14)
NEUTROPHILS # BLD AUTO: 17.4 K/UL — HIGH (ref 1.8–7.4)
NEUTROPHILS NFR BLD AUTO: 89.9 % — HIGH (ref 43–77)
NRBC # BLD: 0 /100 WBCS — SIGNIFICANT CHANGE UP (ref 0–0)
PLATELET # BLD AUTO: 389 K/UL — SIGNIFICANT CHANGE UP (ref 150–400)
POTASSIUM SERPL-MCNC: 3.4 MMOL/L — LOW (ref 3.5–5.3)
POTASSIUM SERPL-SCNC: 3.4 MMOL/L — LOW (ref 3.5–5.3)
PROT SERPL-MCNC: 5.7 GM/DL — LOW (ref 6–8.3)
RBC # BLD: 3.79 M/UL — LOW (ref 3.8–5.2)
RBC # FLD: 12.1 % — SIGNIFICANT CHANGE UP (ref 10.3–14.5)
SODIUM SERPL-SCNC: 145 MMOL/L — SIGNIFICANT CHANGE UP (ref 135–145)
WBC # BLD: 19.36 K/UL — HIGH (ref 3.8–10.5)
WBC # FLD AUTO: 19.36 K/UL — HIGH (ref 3.8–10.5)

## 2021-04-21 RX ORDER — POTASSIUM CHLORIDE 20 MEQ
20 PACKET (EA) ORAL ONCE
Refills: 0 | Status: COMPLETED | OUTPATIENT
Start: 2021-04-21 | End: 2021-04-21

## 2021-04-21 RX ORDER — REMDESIVIR 5 MG/ML
100 INJECTION INTRAVENOUS EVERY 24 HOURS
Refills: 0 | Status: COMPLETED | OUTPATIENT
Start: 2021-04-22 | End: 2021-04-26

## 2021-04-21 RX ADMIN — Medication 500 MILLIGRAM(S): at 11:20

## 2021-04-21 RX ADMIN — Medication 20 MILLIEQUIVALENT(S): at 11:20

## 2021-04-21 RX ADMIN — ENOXAPARIN SODIUM 40 MILLIGRAM(S): 100 INJECTION SUBCUTANEOUS at 11:21

## 2021-04-21 RX ADMIN — ZINC SULFATE TAB 220 MG (50 MG ZINC EQUIVALENT) 220 MILLIGRAM(S): 220 (50 ZN) TAB at 11:20

## 2021-04-21 RX ADMIN — REMDESIVIR 500 MILLIGRAM(S): 5 INJECTION INTRAVENOUS at 22:17

## 2021-04-21 RX ADMIN — Medication 2: at 11:20

## 2021-04-21 RX ADMIN — Medication 2: at 15:47

## 2021-04-21 RX ADMIN — SODIUM CHLORIDE 65 MILLILITER(S): 9 INJECTION INTRAMUSCULAR; INTRAVENOUS; SUBCUTANEOUS at 03:00

## 2021-04-21 RX ADMIN — INSULIN GLARGINE 16 UNIT(S): 100 INJECTION, SOLUTION SUBCUTANEOUS at 22:16

## 2021-04-21 RX ADMIN — Medication 6 MILLIGRAM(S): at 05:30

## 2021-04-21 NOTE — PROGRESS NOTE ADULT - SUBJECTIVE AND OBJECTIVE BOX
TMAX - 97.6    On day # 5 Remdesivir / # 5 Decadron    Vital Signs Last 24 Hrs  T(C): 36.4 (21 Apr 2021 11:09), Max: 36.5 (20 Apr 2021 23:50)  T(F): 97.6 (21 Apr 2021 11:09), Max: 97.7 (20 Apr 2021 23:50)  HR: 71 (21 Apr 2021 11:09) (64 - 71)  BP: 121/62 (21 Apr 2021 11:09) (121/62 - 148/72)  BP(mean): --  RR: 22 (21 Apr 2021 11:09) (18 - 22)  SpO2: 93% (21 Apr 2021 11:09) (90% - 95%)  Supplemental O2:  on 100% NRB O2 now      Awake, alert sitting up on the edge of the bed with NRB O2 in place now - apparently O2 Sat's were decreased to 90% on 7 Liters earlier this AM and patient continued to feel SOB.  Feels better with the NRB O2 in place and O2 Sat's now between 93 - 95%.  Still with intermittent moist sounding cough but no c/o cp or abdominal pain.  Tolerating po diet and drinking some liquids.  Apparently her  who was her roommate was d/c'ed home last PM.  No c/o fevers or chills.      PHYSICAL EXAM  General:  71 y/o female awake, alert, sitting up on the edge of the bed, pleasant and cooperative appears more comfortable now with NRB O2 in place  HEENT:  conj pink, sclerae anicteric, PERRLA, no oral lesions noted but mucosa slightly dry  Neck:  supple, no nodes noted  Heart:  RR  Lungs:  decreased BS at the bases bilaterally but no wheezing noted  Abdomen:  soft, BS+, nontender to palpation, no masses or HS-megaly detected  Back: no CVA or Spinal tenderness elicited to palpation  Extremities:  no edema LE's                     no calf tenderness noted  Skin:  warm, dry, no rash noted      I&O's Summary :    20 Apr 2021 07:01  -  21 Apr 2021 07:00  --------------------------------------------------------  IN: 480 mL / OUT: 0 mL / NET: 480 mL      LABS:  CBC Full  -  ( 21 Apr 2021 08:58 )  WBC Count : 19.36 K/uL  RBC Count : 3.79 M/uL  Hemoglobin : 11.5 g/dL  Hematocrit : 34.1 %  Platelet Count - Automated : 389 K/uL  Mean Cell Volume : 90.0 fl  Mean Cell Hemoglobin : 30.3 pg  Mean Cell Hemoglobin Concentration : 33.7 gm/dL  Auto Neutrophil # : 17.40 K/uL  Auto Lymphocyte # : 0.81 K/uL  Auto Monocyte # : 0.86 K/uL  Auto Eosinophil # : 0.00 K/uL  Auto Basophil # : 0.03 K/uL  Auto Neutrophil % : 89.9 %  Auto Lymphocyte % : 4.2 %  Auto Monocyte % : 4.4 %  Auto Eosinophil % : 0.0 %  Auto Basophil % : 0.2 %    04-21    145  |  111<H>  |  20  ----------------------------<  138<H>  3.4<L>   |  28  |  0.54    Ca    8.1<L>      21 Apr 2021 08:58    TPro  5.7<L>  /  Alb  2.2<L>  /  TBili  0.4  /  DBili  .09  /  AST  26  /  ALT  24  /  AlkPhos  83  04-21    LIVER FUNCTIONS - ( 21 Apr 2021 08:58 )  Alb: 2.2 g/dL / Pro: 5.7 gm/dL / ALK PHOS: 83 U/L / ALT: 24 U/L / AST: 26 U/L / GGT: x           Sedimentation Rate, Erythrocyte: 25 mm/hr (04-20 @ 07:24)  Sedimentation Rate, Erythrocyte: 97 mm/hr (04-18 @ 07:08)    C-Reactive Protein, Serum (04.18.21 @ 10:47)    C-Reactive Protein, Serum: 226: Please note: Reference range has changed due to units of measure change. mg/L    Procalcitonin, Serum (04.18.21 @ 10:59)    Procalcitonin, Serum: 0.10:     Procalcitonin, Serum (04.18.21 @ 10:47)    Procalcitonin, Serum: 0.11    Procalcitonin, Serum (04.18.21 @ 01:25)    Procalcitonin, Serum: 0.08:    A1C with Estimated Average Glucose (04.19.21 @ 10:40)    A1C with Estimated Average Glucose Result: 13.5: Method: Immunoassay       Reference Range                4.0-5.6%       High risk (prediabetic)        5.7-6.4%       Diabetic, diagnostic             >=6.5%       ADA diabetic treatment goal       <7.0%    Estimated Average Glucose: 341    Ferritin, Serum (04.18.21 @ 01:26)    Ferritin, Serum: 612 ng/mL    Lactate, Blood (04.17.21 @ 21:55)    Lactate, Blood: 1.2 mmol/L    Lactate, Blood (04.17.21 @ 17:42)    Lactate, Blood: 1.3 mmol/L    Lactate Dehydrogenase, Serum (04.18.21 @ 10:59)    Lactate Dehydrogenase, Serum: 415 U/L    Lactate Dehydrogenase, Serum (04.18.21 @ 01:25)    Lactate Dehydrogenase, Serum: 479: Mild hemolysis. Results may be falsely elevated. U/L    D-Dimer Assay, Quantitative (04.18.21 @ 07:08)    D-Dimer Assay, Quantitative: 248 ng/mL DDU    D-Dimer Assay, Quantitative (04.17.21 @ 17:42)    D-Dimer Assay, Quantitative: 222 ng/mL DDU        MICROBIOLOGY:    COVID-19 Erwin Domain Antibody (04.18.21 @ 10:45)    COVID-19 Erwin Domain Antibody Result: 0.40: Roche ECLIA Total AB (AKILAH)  NOTE: This result index represents a total antibody measurement, which  includes IgG, IgA and IgM.  Measures Receptor Binding Domain of the Erwin Protein  Negative <= 0.79 U/mL  Positive  >= 0.80 U/mL U/mL    COVID-19 Erwin Domain AB Interp: Negative    Flu With COVID-19 By KELLI (04.17.21 @ 18:31)    SARS-CoV-2 Result: Detected: EUA/IVD    Influenza A Result: NotDetec: EUA/IVD    Influenza B Result: NotDetec: EUA/IVD      RADIOLOGY:         < from: CT Angio Chest w/ IV Cont (04.19.21 @ 19:01) >  EXAM:  CT ANGIO CHEST (W)AW IC                        PROCEDURE DATE:  04/19/2021    INTERPRETATION:  CLINICAL INFORMATION: Shortness of breath. COVID positive.  COMPARISON: Chest x-ray of 4/17/2021.  CONTRAST/COMPLICATIONS:  IV Contrast: Omnipaque 350  60 cc administered   40 cc discarded  Oral Contrast: NONE  Complications: None reported at time of study completion  PROCEDURE:  CT Angiography of the Chest.  Sagittal and coronal reformats were performed as well as 3D (MIP) reconstructions.  FINDINGS:  LUNGS AND AIRWAYS: Patent central airways.  The lungs demonstrate bilateral multi focal groundglass opacities most prominent in the midlungs suggestive of atypical pneumonia such as that from COVID 19. Punctate calcified left lower lobe granuloma.  PLEURA: No pleural effusion.  MEDIASTINUM AND JUNIOR: No lymphadenopathy. Punctate left hilar calcification  VESSELS: There is atherosclerotic calcification of the aorta  HEART: Heart size is normal. No pericardial effusion.  CHEST WALL AND LOWER NECK: Within normal limits.  VISUALIZED UPPER ABDOMEN: Within normal limits.  BONES: Degenerative change of the spine  IMPRESSION: No evidence of pulmonary embolism  Bilateral multifocal infiltrates suggestive of pneumonia associated with COVID 19. Lines hilar calcifications and calcified left lower lobe granuloma consistent with old granulomatous disease                     < from: Xray Chest 1 View- PORTABLE-Urgent (04.17.21 @ 17:26) >  EXAM:  XR CHEST PORTABLE URGENT 1V                        PROCEDURE DATE:  04/17/2021    INTERPRETATION:  Chest one view  HISTORY: Shortness of breath, cough and fever  COMPARISON STUDY: None available  Frontal expiratory view of the chest shows the heart to be slightly enlarged in size. The lungs show bilateral patchy pulmonary infiltrates and there is no evidence of pneumothorax nor pleural effusion.  IMPRESSION:  Patchy infiltrates. Differential diagnosis includes Covid-19 pneumonia among other possibilities.        Impression:   71 y/o female with hx of DM reportedly diet- controlled but otherwise in good health, admitted via the ER on 4/17/21 with c/o worsening SOB and cough with associated pleuritic -type cp over the prior few days after testing positive for COVID-19 by PCR a few days PTA and noted by her niece who is an RN to have decreased O2 Sat to 84% at home.  In the ER patient was begun on 3 Liters NC O2 and rx with Remdesivir and Decadron was begun.  Initial CXR revealed bilateral patchy pulmonary infiltrates consistent with dx of COVID-19 Multifocal PNA.   Ct Angio of the Chest was done on 4/19/21 and confirmed  Bilateral Multifocal Infiltrates but no evidence of PE, but with evidence of old granulomatous disease involving the LLL. O2 Sat's have remained low and patient now requiring NRB O2.  WBC's are elevated - likely secondary to steroids.  Further w/u with HGB A1C was found to be very elevated consistent with poorly-controlled DM at home.    Suggestions:  Will therefore extend current rx with Remdesivir and Decadron in view of continued/ worsening hypoxia.  Follow-up temps and labs closely.  Maintain Airborne/ Contact Isolation for COVID-19 PNA.  OOB yo chair as tolerated.  Discussed in detail with patient at the bedside.           TMAX - 97.6    On day # 5 Remdesivir / # 5 Decadron    Vital Signs Last 24 Hrs  T(C): 36.4 (21 Apr 2021 11:09), Max: 36.5 (20 Apr 2021 23:50)  T(F): 97.6 (21 Apr 2021 11:09), Max: 97.7 (20 Apr 2021 23:50)  HR: 71 (21 Apr 2021 11:09) (64 - 71)  BP: 121/62 (21 Apr 2021 11:09) (121/62 - 148/72)  BP(mean): --  RR: 22 (21 Apr 2021 11:09) (18 - 22)  SpO2: 93% (21 Apr 2021 11:09) (90% - 95%)  Supplemental O2:  on 100% NRB O2 now      Awake, alert sitting up on the edge of the bed with NRB O2 in place now - apparently O2 Sat's were decreased to 90% on 7 Liters earlier this AM and patient continued to feel SOB.  Feels better with the NRB O2 in place and O2 Sat's now between 93 - 95%.  Still with intermittent moist sounding cough but no c/o cp or abdominal pain.  Tolerating po diet and drinking some liquids.  Apparently her  who was her roommate was d/c'ed home last PM.  No c/o fevers or chills.      PHYSICAL EXAM  General:  69 y/o female awake, alert, sitting up on the edge of the bed, pleasant and cooperative appears more comfortable now with NRB O2 in place  HEENT:  conj pink, sclerae anicteric, PERRLA, no oral lesions noted but mucosa slightly dry  Neck:  supple, no nodes noted  Heart:  RR  Lungs:  decreased BS at the bases bilaterally but no wheezing noted  Abdomen:  soft, BS+, nontender to palpation, no masses or HS-megaly detected  Back: no CVA or Spinal tenderness elicited to palpation  Extremities:  no edema LE's                     no calf tenderness noted  Skin:  warm, dry, no rash noted      I&O's Summary :    20 Apr 2021 07:01  -  21 Apr 2021 07:00  --------------------------------------------------------  IN: 480 mL / OUT: 0 mL / NET: 480 mL      LABS:  CBC Full  -  ( 21 Apr 2021 08:58 )  WBC Count : 19.36 K/uL  RBC Count : 3.79 M/uL  Hemoglobin : 11.5 g/dL  Hematocrit : 34.1 %  Platelet Count - Automated : 389 K/uL  Mean Cell Volume : 90.0 fl  Mean Cell Hemoglobin : 30.3 pg  Mean Cell Hemoglobin Concentration : 33.7 gm/dL  Auto Neutrophil # : 17.40 K/uL  Auto Lymphocyte # : 0.81 K/uL  Auto Monocyte # : 0.86 K/uL  Auto Eosinophil # : 0.00 K/uL  Auto Basophil # : 0.03 K/uL  Auto Neutrophil % : 89.9 %  Auto Lymphocyte % : 4.2 %  Auto Monocyte % : 4.4 %  Auto Eosinophil % : 0.0 %  Auto Basophil % : 0.2 %    04-21    145  |  111<H>  |  20  ----------------------------<  138<H>  3.4<L>   |  28  |  0.54    Ca    8.1<L>      21 Apr 2021 08:58    TPro  5.7<L>  /  Alb  2.2<L>  /  TBili  0.4  /  DBili  .09  /  AST  26  /  ALT  24  /  AlkPhos  83  04-21    LIVER FUNCTIONS - ( 21 Apr 2021 08:58 )  Alb: 2.2 g/dL / Pro: 5.7 gm/dL / ALK PHOS: 83 U/L / ALT: 24 U/L / AST: 26 U/L / GGT: x           Sedimentation Rate, Erythrocyte: 25 mm/hr (04-20 @ 07:24)  Sedimentation Rate, Erythrocyte: 97 mm/hr (04-18 @ 07:08)    C-Reactive Protein, Serum (04.18.21 @ 10:47)    C-Reactive Protein, Serum: 226: Please note: Reference range has changed due to units of measure change. mg/L    Procalcitonin, Serum (04.18.21 @ 10:59)    Procalcitonin, Serum: 0.10:     Procalcitonin, Serum (04.18.21 @ 10:47)    Procalcitonin, Serum: 0.11    Procalcitonin, Serum (04.18.21 @ 01:25)    Procalcitonin, Serum: 0.08:    A1C with Estimated Average Glucose (04.19.21 @ 10:40)    A1C with Estimated Average Glucose Result: 13.5: Method: Immunoassay       Reference Range                4.0-5.6%       High risk (prediabetic)        5.7-6.4%       Diabetic, diagnostic             >=6.5%       ADA diabetic treatment goal       <7.0%    Estimated Average Glucose: 341    Ferritin, Serum (04.18.21 @ 01:26)    Ferritin, Serum: 612 ng/mL    Lactate, Blood (04.17.21 @ 21:55)    Lactate, Blood: 1.2 mmol/L    Lactate, Blood (04.17.21 @ 17:42)    Lactate, Blood: 1.3 mmol/L    Lactate Dehydrogenase, Serum (04.18.21 @ 10:59)    Lactate Dehydrogenase, Serum: 415 U/L    Lactate Dehydrogenase, Serum (04.18.21 @ 01:25)    Lactate Dehydrogenase, Serum: 479: Mild hemolysis. Results may be falsely elevated. U/L    D-Dimer Assay, Quantitative (04.18.21 @ 07:08)    D-Dimer Assay, Quantitative: 248 ng/mL DDU    D-Dimer Assay, Quantitative (04.17.21 @ 17:42)    D-Dimer Assay, Quantitative: 222 ng/mL DDU        MICROBIOLOGY:    COVID-19 Erwin Domain Antibody (04.18.21 @ 10:45)    COVID-19 Erwin Domain Antibody Result: 0.40: Roche ECLIA Total AB (AKILAH)  NOTE: This result index represents a total antibody measurement, which  includes IgG, IgA and IgM.  Measures Receptor Binding Domain of the Erwin Protein  Negative <= 0.79 U/mL  Positive  >= 0.80 U/mL U/mL    COVID-19 Erwin Domain AB Interp: Negative    Flu With COVID-19 By KELLI (04.17.21 @ 18:31)    SARS-CoV-2 Result: Detected: EUA/IVD    Influenza A Result: NotDetec: EUA/IVD    Influenza B Result: NotDetec: EUA/IVD      RADIOLOGY:         < from: CT Angio Chest w/ IV Cont (04.19.21 @ 19:01) >  EXAM:  CT ANGIO CHEST (W)AW IC                        PROCEDURE DATE:  04/19/2021    INTERPRETATION:  CLINICAL INFORMATION: Shortness of breath. COVID positive.  COMPARISON: Chest x-ray of 4/17/2021.  CONTRAST/COMPLICATIONS:  IV Contrast: Omnipaque 350  60 cc administered   40 cc discarded  Oral Contrast: NONE  Complications: None reported at time of study completion  PROCEDURE:  CT Angiography of the Chest.  Sagittal and coronal reformats were performed as well as 3D (MIP) reconstructions.  FINDINGS:  LUNGS AND AIRWAYS: Patent central airways.  The lungs demonstrate bilateral multi focal groundglass opacities most prominent in the midlungs suggestive of atypical pneumonia such as that from COVID 19. Punctate calcified left lower lobe granuloma.  PLEURA: No pleural effusion.  MEDIASTINUM AND JUNIOR: No lymphadenopathy. Punctate left hilar calcification  VESSELS: There is atherosclerotic calcification of the aorta  HEART: Heart size is normal. No pericardial effusion.  CHEST WALL AND LOWER NECK: Within normal limits.  VISUALIZED UPPER ABDOMEN: Within normal limits.  BONES: Degenerative change of the spine  IMPRESSION: No evidence of pulmonary embolism  Bilateral multifocal infiltrates suggestive of pneumonia associated with COVID 19. Lines hilar calcifications and calcified left lower lobe granuloma consistent with old granulomatous disease                     < from: Xray Chest 1 View- PORTABLE-Urgent (04.17.21 @ 17:26) >  EXAM:  XR CHEST PORTABLE URGENT 1V                        PROCEDURE DATE:  04/17/2021    INTERPRETATION:  Chest one view  HISTORY: Shortness of breath, cough and fever  COMPARISON STUDY: None available  Frontal expiratory view of the chest shows the heart to be slightly enlarged in size. The lungs show bilateral patchy pulmonary infiltrates and there is no evidence of pneumothorax nor pleural effusion.  IMPRESSION:  Patchy infiltrates. Differential diagnosis includes Covid-19 pneumonia among other possibilities.        Impression:   69 y/o female with hx of DM reportedly diet- controlled but otherwise in good health, admitted via the ER on 4/17/21 with c/o worsening SOB and cough with associated pleuritic -type cp over the prior few days after testing positive for COVID-19 by PCR a few days PTA and noted by her niece who is an RN to have decreased O2 Sat to 84% at home.  In the ER patient was begun on 3 Liters NC O2 and rx with Remdesivir and Decadron was begun.  Initial CXR revealed bilateral patchy pulmonary infiltrates consistent with dx of COVID-19 Multifocal PNA.   Ct Angio of the Chest was done on 4/19/21 and confirmed  Bilateral Multifocal Infiltrates but no evidence of PE, but with evidence of old granulomatous disease involving the LLL. O2 Sat's have remained low and patient now requiring NRB O2.  WBC's are elevated - likely secondary to steroids.  Further w/u with HGB A1C was found to be very elevated consistent with poorly-controlled DM at home and patient was begun on Insulin rx.      Suggestions:  Will therefore extend current rx with Remdesivir and Decadron in view of continued/ worsening hypoxia.  Follow-up temps and labs closely.  Maintain Airborne/ Contact Isolation for COVID-19 Multifocal PNA. OOB to chair as tolerated.  Discussed in detail with patient at the bedside.

## 2021-04-21 NOTE — PROGRESS NOTE ADULT - SUBJECTIVE AND OBJECTIVE BOX
Patient is a 70y old  Female who presents with a chief complaint of covid PNA with Hypoxia (20 Apr 2021 17:32)  needs supplemental o2.   events noted   patient not in distress   requiring non rebreather  now     INTERVAL HPI/OVERNIGHT EVENTS:  PAST MEDICAL & SURGICAL HISTORY:      MEDICATIONS  (STANDING):  ascorbic acid 500 milliGRAM(s) Oral daily  dexAMETHasone  Injectable 6 milliGRAM(s) IV Push daily  dextrose 40% Gel 15 Gram(s) Oral once  dextrose 5%. 1000 milliLiter(s) (50 mL/Hr) IV Continuous <Continuous>  dextrose 5%. 1000 milliLiter(s) (100 mL/Hr) IV Continuous <Continuous>  dextrose 50% Injectable 25 Gram(s) IV Push once  dextrose 50% Injectable 12.5 Gram(s) IV Push once  dextrose 50% Injectable 25 Gram(s) IV Push once  enoxaparin Injectable 40 milliGRAM(s) SubCutaneous daily  glucagon  Injectable 1 milliGRAM(s) IntraMuscular once  insulin glargine Injectable (LANTUS) 16 Unit(s) SubCutaneous at bedtime  insulin lispro (ADMELOG) corrective regimen sliding scale   SubCutaneous three times a day before meals  insulin lispro (ADMELOG) corrective regimen sliding scale   SubCutaneous at bedtime  remdesivir  IVPB   IV Intermittent   remdesivir  IVPB 100 milliGRAM(s) IV Intermittent every 24 hours  senna 2 Tablet(s) Oral at bedtime  sodium chloride 0.9%. 1000 milliLiter(s) (65 mL/Hr) IV Continuous <Continuous>  zinc sulfate 220 milliGRAM(s) Oral daily    MEDICATIONS  (PRN):  acetaminophen   Tablet .. 650 milliGRAM(s) Oral every 4 hours PRN Temp greater or equal to 38.5C (101.3F)  magnesium hydroxide Suspension 30 milliLiter(s) Oral daily PRN Constipation      Allergies    Allergy Status Unknown    Intolerances        REVIEW OF SYSTEMS:  CONSTITUTIONAL: No fever, weight loss, or fatigue  EYES: No eye pain, visual disturbances, or discharge  ENMT:  No difficulty hearing, tinnitus, vertigo; No sinus or throat pain  NECK: No pain or stiffness  BREASTS: No pain, masses, or nipple discharge  RESPIRATORY: No cough, wheezing, chills or hemoptysis; No shortness of breath  CARDIOVASCULAR: No chest pain, palpitations, dizziness, or leg swelling  GASTROINTESTINAL: No abdominal or epigastric pain. No nausea, vomiting, or hematemesis; No diarrhea or constipation. No melena or hematochezia.  GENITOURINARY: No dysuria, frequency, hematuria, or incontinence  NEUROLOGICAL: No headaches, memory loss, loss of strength, numbness, or tremors  SKIN: No itching, burning, rashes, or lesions   LYMPH NODES: No enlarged glands  ENDOCRINE: No heat or cold intolerance; No hair loss  MUSCULOSKELETAL: No joint pain or swelling; No muscle, back, or extremity pain  PSYCHIATRIC: No depression, anxiety, mood swings, or difficulty sleeping  HEME/LYMPH: No easy bruising, or bleeding gums  ALLERY AND IMMUNOLOGIC: No hives or eczema    Vital Signs Last 24 Hrs  T(C): 36.3 (21 Apr 2021 05:00), Max: 36.6 (20 Apr 2021 15:54)  T(F): 97.4 (21 Apr 2021 05:00), Max: 97.9 (20 Apr 2021 15:54)  HR: 67 (21 Apr 2021 05:00) (67 - 72)  BP: 138/63 (21 Apr 2021 05:00) (138/63 - 148/72)  BP(mean): --  RR: 20 (21 Apr 2021 06:45) (18 - 20)  SpO2: 95% (21 Apr 2021 06:45) (90% - 95%)    PHYSICAL EXAM:  GENERAL: NAD, well-groomed, well-developed  HEAD:  Atraumatic, Normocephalic  EYES: EOMI, PERRLA, conjunctiva and sclera clear  ENMT: No tonsillar erythema, exudates, or enlargement; Moist mucous membranes, Good dentition, No lesions  NECK: Supple, No JVD, Normal thyroid  NERVOUS SYSTEM:  Alert & Oriented X3, Good concentration; Motor Strength 5/5 B/L upper and lower extremities; DTRs 2+ intact and symmetric  CHEST/LUNG: Clear to percussion . only scattered rales. no wheezing  HEART: Regular rate and rhythm; No murmurs, rubs, or gallops  ABDOMEN: Soft, Nontender, Nondistended; Bowel sounds present  EXTREMITIES:  2+ Peripheral Pulses, No clubbing, cyanosis, or edema  LYMPH: No lymphadenopathy noted  SKIN: No rashes or lesions    LABS:                        11.5   19.36 )-----------( 389      ( 21 Apr 2021 08:58 )             34.1     04-21    145  |  111<H>  |  20  ----------------------------<  138<H>  3.4<L>   |  28  |  0.54    Ca    8.1<L>      21 Apr 2021 08:58    TPro  5.7<L>  /  Alb  2.2<L>  /  TBili  0.4  /  DBili  .09  /  AST  26  /  ALT  24  /  AlkPhos  83  04-21          CAPILLARY BLOOD GLUCOSE      POCT Blood Glucose.: 150 mg/dL (21 Apr 2021 07:27)  POCT Blood Glucose.: 310 mg/dL (20 Apr 2021 21:30)  POCT Blood Glucose.: 403 mg/dL (20 Apr 2021 16:26)  POCT Blood Glucose.: 310 mg/dL (20 Apr 2021 11:09)                RADIOLOGY & ADDITIONAL TESTS:    Imaging Personally Reviewed:  [ ] YES  [ ] NO    Consultant(s) Notes Reviewed:  [x YES  [ ] NO    Care Discussed with Consultants/Other Providers [x ] YES  [ ] NO    Care discussed with family,         [  ]   yes  [  ]  No    imp:    stable[ ]    unstable[  ]     improving [   ]       unchanged  [ x ]                Plans:  Continue present plans  [ x ] continous pulse ox, o2 by non rebreather, proning               New consult [  ]   specialty  .......               order test[  ]    test name.                  Discharge Planning  [  ]

## 2021-04-22 LAB
ALBUMIN SERPL ELPH-MCNC: 2.4 G/DL — LOW (ref 3.3–5)
ALP SERPL-CCNC: 96 U/L — SIGNIFICANT CHANGE UP (ref 40–120)
ALT FLD-CCNC: 25 U/L — SIGNIFICANT CHANGE UP (ref 12–78)
ANION GAP SERPL CALC-SCNC: 4 MMOL/L — LOW (ref 5–17)
AST SERPL-CCNC: 26 U/L — SIGNIFICANT CHANGE UP (ref 15–37)
BASOPHILS # BLD AUTO: 0.04 K/UL — SIGNIFICANT CHANGE UP (ref 0–0.2)
BASOPHILS NFR BLD AUTO: 0.2 % — SIGNIFICANT CHANGE UP (ref 0–2)
BILIRUB DIRECT SERPL-MCNC: 0.27 MG/DL — HIGH (ref 0.05–0.2)
BILIRUB INDIRECT FLD-MCNC: 0.1 MG/DL — LOW (ref 0.2–1)
BILIRUB SERPL-MCNC: 0.4 MG/DL — SIGNIFICANT CHANGE UP (ref 0.2–1.2)
BUN SERPL-MCNC: 18 MG/DL — SIGNIFICANT CHANGE UP (ref 7–23)
CALCIUM SERPL-MCNC: 8.2 MG/DL — LOW (ref 8.5–10.1)
CHLORIDE SERPL-SCNC: 108 MMOL/L — SIGNIFICANT CHANGE UP (ref 96–108)
CO2 SERPL-SCNC: 30 MMOL/L — SIGNIFICANT CHANGE UP (ref 22–31)
CREAT SERPL-MCNC: 0.56 MG/DL — SIGNIFICANT CHANGE UP (ref 0.5–1.3)
CREAT SERPL-MCNC: 0.6 MG/DL — SIGNIFICANT CHANGE UP (ref 0.5–1.3)
CRP SERPL-MCNC: 46 MG/L — HIGH
D DIMER BLD IA.RAPID-MCNC: 6019 NG/ML DDU — HIGH
EOSINOPHIL # BLD AUTO: 0.01 K/UL — SIGNIFICANT CHANGE UP (ref 0–0.5)
EOSINOPHIL NFR BLD AUTO: 0 % — SIGNIFICANT CHANGE UP (ref 0–6)
ERYTHROCYTE [SEDIMENTATION RATE] IN BLOOD: 52 MM/HR — HIGH (ref 0–20)
FERRITIN SERPL-MCNC: 585 NG/ML — HIGH (ref 15–150)
GLUCOSE BLDC GLUCOMTR-MCNC: 243 MG/DL — HIGH (ref 70–99)
GLUCOSE BLDC GLUCOMTR-MCNC: 244 MG/DL — HIGH (ref 70–99)
GLUCOSE BLDC GLUCOMTR-MCNC: 266 MG/DL — HIGH (ref 70–99)
GLUCOSE BLDC GLUCOMTR-MCNC: 99 MG/DL — SIGNIFICANT CHANGE UP (ref 70–99)
GLUCOSE SERPL-MCNC: 89 MG/DL — SIGNIFICANT CHANGE UP (ref 70–99)
HCT VFR BLD CALC: 35.4 % — SIGNIFICANT CHANGE UP (ref 34.5–45)
HGB BLD-MCNC: 12.1 G/DL — SIGNIFICANT CHANGE UP (ref 11.5–15.5)
IMM GRANULOCYTES NFR BLD AUTO: 1.1 % — SIGNIFICANT CHANGE UP (ref 0–1.5)
LYMPHOCYTES # BLD AUTO: 0.77 K/UL — LOW (ref 1–3.3)
LYMPHOCYTES # BLD AUTO: 3.8 % — LOW (ref 13–44)
MCHC RBC-ENTMCNC: 30.2 PG — SIGNIFICANT CHANGE UP (ref 27–34)
MCHC RBC-ENTMCNC: 34.2 GM/DL — SIGNIFICANT CHANGE UP (ref 32–36)
MCV RBC AUTO: 88.3 FL — SIGNIFICANT CHANGE UP (ref 80–100)
MONOCYTES # BLD AUTO: 0.7 K/UL — SIGNIFICANT CHANGE UP (ref 0–0.9)
MONOCYTES NFR BLD AUTO: 3.5 % — SIGNIFICANT CHANGE UP (ref 2–14)
NEUTROPHILS # BLD AUTO: 18.34 K/UL — HIGH (ref 1.8–7.4)
NEUTROPHILS NFR BLD AUTO: 91.4 % — HIGH (ref 43–77)
NRBC # BLD: 0 /100 WBCS — SIGNIFICANT CHANGE UP (ref 0–0)
PLATELET # BLD AUTO: 404 K/UL — HIGH (ref 150–400)
POTASSIUM SERPL-MCNC: 4.1 MMOL/L — SIGNIFICANT CHANGE UP (ref 3.5–5.3)
POTASSIUM SERPL-SCNC: 4.1 MMOL/L — SIGNIFICANT CHANGE UP (ref 3.5–5.3)
PROCALCITONIN SERPL-MCNC: 0.06 NG/ML — SIGNIFICANT CHANGE UP (ref 0.02–0.1)
PROT SERPL-MCNC: 6.1 GM/DL — SIGNIFICANT CHANGE UP (ref 6–8.3)
RBC # BLD: 4.01 M/UL — SIGNIFICANT CHANGE UP (ref 3.8–5.2)
RBC # FLD: 12.1 % — SIGNIFICANT CHANGE UP (ref 10.3–14.5)
SODIUM SERPL-SCNC: 142 MMOL/L — SIGNIFICANT CHANGE UP (ref 135–145)
WBC # BLD: 20.09 K/UL — HIGH (ref 3.8–10.5)
WBC # FLD AUTO: 20.09 K/UL — HIGH (ref 3.8–10.5)

## 2021-04-22 RX ADMIN — Medication 6 MILLIGRAM(S): at 04:22

## 2021-04-22 RX ADMIN — INSULIN GLARGINE 16 UNIT(S): 100 INJECTION, SOLUTION SUBCUTANEOUS at 22:29

## 2021-04-22 RX ADMIN — Medication 6: at 16:31

## 2021-04-22 RX ADMIN — ZINC SULFATE TAB 220 MG (50 MG ZINC EQUIVALENT) 220 MILLIGRAM(S): 220 (50 ZN) TAB at 11:50

## 2021-04-22 RX ADMIN — Medication 500 MILLIGRAM(S): at 11:50

## 2021-04-22 RX ADMIN — ENOXAPARIN SODIUM 40 MILLIGRAM(S): 100 INJECTION SUBCUTANEOUS at 11:50

## 2021-04-22 RX ADMIN — SENNA PLUS 2 TABLET(S): 8.6 TABLET ORAL at 22:29

## 2021-04-22 RX ADMIN — Medication 4: at 11:51

## 2021-04-22 RX ADMIN — REMDESIVIR 500 MILLIGRAM(S): 5 INJECTION INTRAVENOUS at 22:30

## 2021-04-22 NOTE — PROGRESS NOTE ADULT - SUBJECTIVE AND OBJECTIVE BOX
Patient is a 70y old  Female who presents with a chief complaint of covid PNA with Hypoxia (21 Apr 2021 16:08)    patient able maintain  good o2 sats on Ventimask with 50% o2. No distress  No fever or cough      INTERVAL HPI/OVERNIGHT EVENTS:  PAST MEDICAL & SURGICAL HISTORY:      MEDICATIONS  (STANDING):  ascorbic acid 500 milliGRAM(s) Oral daily  dexAMETHasone  Injectable 6 milliGRAM(s) IV Push daily  dextrose 40% Gel 15 Gram(s) Oral once  dextrose 5%. 1000 milliLiter(s) (50 mL/Hr) IV Continuous <Continuous>  dextrose 5%. 1000 milliLiter(s) (100 mL/Hr) IV Continuous <Continuous>  dextrose 50% Injectable 25 Gram(s) IV Push once  dextrose 50% Injectable 12.5 Gram(s) IV Push once  dextrose 50% Injectable 25 Gram(s) IV Push once  enoxaparin Injectable 40 milliGRAM(s) SubCutaneous daily  glucagon  Injectable 1 milliGRAM(s) IntraMuscular once  insulin glargine Injectable (LANTUS) 16 Unit(s) SubCutaneous at bedtime  insulin lispro (ADMELOG) corrective regimen sliding scale   SubCutaneous three times a day before meals  insulin lispro (ADMELOG) corrective regimen sliding scale   SubCutaneous at bedtime  remdesivir  IVPB 100 milliGRAM(s) IV Intermittent every 24 hours  senna 2 Tablet(s) Oral at bedtime  sodium chloride 0.9%. 1000 milliLiter(s) (65 mL/Hr) IV Continuous <Continuous>  zinc sulfate 220 milliGRAM(s) Oral daily    MEDICATIONS  (PRN):  acetaminophen   Tablet .. 650 milliGRAM(s) Oral every 4 hours PRN Temp greater or equal to 38.5C (101.3F)  magnesium hydroxide Suspension 30 milliLiter(s) Oral daily PRN Constipation      Allergies    Allergy Status Unknown    Intolerances        REVIEW OF SYSTEMS:  CONSTITUTIONAL: No fever, weight loss, or fatigue  EYES: No eye pain, visual disturbances, or discharge  ENMT:  No difficulty hearing, tinnitus, vertigo; No sinus or throat pain  NECK: No pain or stiffness  BREASTS: No pain, masses, or nipple discharge  RESPIRATORY: No cough, wheezing, chills or hemoptysis; No shortness of breath  CARDIOVASCULAR: No chest pain, palpitations, dizziness, or leg swelling  GASTROINTESTINAL: No abdominal or epigastric pain. No nausea, vomiting, or hematemesis; No diarrhea or constipation. No melena or hematochezia.  GENITOURINARY: No dysuria, frequency, hematuria, or incontinence  NEUROLOGICAL: No headaches, memory loss, loss of strength, numbness, or tremors  SKIN: No itching, burning, rashes, or lesions   LYMPH NODES: No enlarged glands  ENDOCRINE: No heat or cold intolerance; No hair loss  MUSCULOSKELETAL: No joint pain or swelling; No muscle, back, or extremity pain  PSYCHIATRIC: No depression, anxiety, mood swings, or difficulty sleeping  HEME/LYMPH: No easy bruising, or bleeding gums  ALLERY AND IMMUNOLOGIC: No hives or eczema    Vital Signs Last 24 Hrs  T(C): 36.7 (22 Apr 2021 05:28), Max: 36.7 (22 Apr 2021 05:28)  T(F): 98 (22 Apr 2021 05:28), Max: 98 (22 Apr 2021 05:28)  HR: 69 (22 Apr 2021 05:28) (50 - 76)  BP: 133/69 (22 Apr 2021 05:28) (121/62 - 138/69)  BP(mean): --  RR: 20 (22 Apr 2021 05:28) (20 - 22)  SpO2: 92% (22 Apr 2021 05:28) (92% - 95%)    PHYSICAL EXAM:  GENERAL: NAD, well-groomed, well-developed  HEAD:  Atraumatic, Normocephalic  EYES: EOMI, PERRLA, conjunctiva and sclera clear  ENMT: No tonsillar erythema, exudates, or enlargement; Moist mucous membranes, Good dentition, No lesions  NECK: Supple, No JVD, Normal thyroid  NERVOUS SYSTEM:  Alert & Oriented X3, Good concentration; Motor Strength 5/5 B/L upper and lower extremities; DTRs 2+ intact and symmetric  CHEST/LUNG: Clear to percussion bilaterally; No rales, rhonchi, wheezing, or rubs  HEART: Regular rate and rhythm; No murmurs, rubs, or gallops  ABDOMEN: Soft, Nontender, Nondistended; Bowel sounds present  EXTREMITIES:  2+ Peripheral Pulses, No clubbing, cyanosis, or edema  LYMPH: No lymphadenopathy noted  SKIN: No rashes or lesions    LABS:                        12.1   20.09 )-----------( 404      ( 22 Apr 2021 08:04 )             35.4     04-22    142  |  108  |  18  ----------------------------<  89  4.1   |  30  |  0.60    Ca    8.2<L>      22 Apr 2021 08:04    TPro  6.1  /  Alb  2.4<L>  /  TBili  0.4  /  DBili  .27<H>  /  AST  26  /  ALT  25  /  AlkPhos  96  04-22          CAPILLARY BLOOD GLUCOSE      POCT Blood Glucose.: 99 mg/dL (22 Apr 2021 08:20)  POCT Blood Glucose.: 243 mg/dL (21 Apr 2021 20:51)  POCT Blood Glucose.: 193 mg/dL (21 Apr 2021 15:40)  POCT Blood Glucose.: 162 mg/dL (21 Apr 2021 11:10)                RADIOLOGY & ADDITIONAL TESTS:    Imaging Personally Reviewed:  [ ] YES  [ ] NO    Consultant(s) Notes Reviewed:  [x YES  [ ] NO    Care Discussed with Consultants/Other Providers [x ] YES  [ ] NO    Care discussed with family,         [  ]   yes  [  ]  No    imp:    stable[x ]    unstable[  ]     improving [ x  ]       unchanged  [  ]                Plans:  Continue present plans  [ x ] continue o2 supplementation/ dexamethasone/ Proning               New consult [  ]   specialty  .......               order test[  ]    test name.  check LDH and ferritin and D-DIMER in am                Discharge Planning  [  ]

## 2021-04-22 NOTE — PHYSICAL THERAPY INITIAL EVALUATION ADULT - ADDITIONAL COMMENTS
Per patient, she lives in a house with no steps to enter, no steps once inside. Patient denies use of assistive device prior to hospitalization.

## 2021-04-22 NOTE — PHYSICAL THERAPY INITIAL EVALUATION ADULT - BALANCE TRAINING, PT EVAL
Patient will be normal dynamic standing balance without device in 1 week to improve safety and decrease risk of falls.

## 2021-04-22 NOTE — PHYSICAL THERAPY INITIAL EVALUATION ADULT - PRECAUTIONS/LIMITATIONS, REHAB EVAL
venti mask/cardiac precautions/fall precautions/isolation precautions/oxygen therapy device and L/min

## 2021-04-23 LAB
ALBUMIN SERPL ELPH-MCNC: 2.2 G/DL — LOW (ref 3.3–5)
ALP SERPL-CCNC: 87 U/L — SIGNIFICANT CHANGE UP (ref 40–120)
ALT FLD-CCNC: 18 U/L — SIGNIFICANT CHANGE UP (ref 12–78)
ANION GAP SERPL CALC-SCNC: 5 MMOL/L — SIGNIFICANT CHANGE UP (ref 5–17)
AST SERPL-CCNC: 17 U/L — SIGNIFICANT CHANGE UP (ref 15–37)
BILIRUB DIRECT SERPL-MCNC: 0.14 MG/DL — SIGNIFICANT CHANGE UP (ref 0.05–0.2)
BILIRUB SERPL-MCNC: 0.5 MG/DL — SIGNIFICANT CHANGE UP (ref 0.2–1.2)
BUN SERPL-MCNC: 18 MG/DL — SIGNIFICANT CHANGE UP (ref 7–23)
CALCIUM SERPL-MCNC: 8.1 MG/DL — LOW (ref 8.5–10.1)
CHLORIDE SERPL-SCNC: 108 MMOL/L — SIGNIFICANT CHANGE UP (ref 96–108)
CO2 SERPL-SCNC: 32 MMOL/L — HIGH (ref 22–31)
CREAT SERPL-MCNC: 0.57 MG/DL — SIGNIFICANT CHANGE UP (ref 0.5–1.3)
CRP SERPL-MCNC: 78 MG/L — HIGH
D DIMER BLD IA.RAPID-MCNC: 7816 NG/ML DDU — HIGH
FERRITIN SERPL-MCNC: 512 NG/ML — HIGH (ref 15–150)
GLUCOSE BLDC GLUCOMTR-MCNC: 141 MG/DL — HIGH (ref 70–99)
GLUCOSE BLDC GLUCOMTR-MCNC: 178 MG/DL — HIGH (ref 70–99)
GLUCOSE BLDC GLUCOMTR-MCNC: 218 MG/DL — HIGH (ref 70–99)
GLUCOSE BLDC GLUCOMTR-MCNC: 235 MG/DL — HIGH (ref 70–99)
GLUCOSE BLDC GLUCOMTR-MCNC: 86 MG/DL — SIGNIFICANT CHANGE UP (ref 70–99)
GLUCOSE SERPL-MCNC: 64 MG/DL — LOW (ref 70–99)
HCT VFR BLD CALC: 34.2 % — LOW (ref 34.5–45)
HGB BLD-MCNC: 11.9 G/DL — SIGNIFICANT CHANGE UP (ref 11.5–15.5)
LDH SERPL L TO P-CCNC: 479 U/L — HIGH (ref 50–242)
MCHC RBC-ENTMCNC: 30.2 PG — SIGNIFICANT CHANGE UP (ref 27–34)
MCHC RBC-ENTMCNC: 34.8 GM/DL — SIGNIFICANT CHANGE UP (ref 32–36)
MCV RBC AUTO: 86.8 FL — SIGNIFICANT CHANGE UP (ref 80–100)
NRBC # BLD: 0 /100 WBCS — SIGNIFICANT CHANGE UP (ref 0–0)
PLATELET # BLD AUTO: 344 K/UL — SIGNIFICANT CHANGE UP (ref 150–400)
POTASSIUM SERPL-MCNC: 3.8 MMOL/L — SIGNIFICANT CHANGE UP (ref 3.5–5.3)
POTASSIUM SERPL-SCNC: 3.8 MMOL/L — SIGNIFICANT CHANGE UP (ref 3.5–5.3)
PROT SERPL-MCNC: 5.6 GM/DL — LOW (ref 6–8.3)
RBC # BLD: 3.94 M/UL — SIGNIFICANT CHANGE UP (ref 3.8–5.2)
RBC # FLD: 12.1 % — SIGNIFICANT CHANGE UP (ref 10.3–14.5)
SODIUM SERPL-SCNC: 145 MMOL/L — SIGNIFICANT CHANGE UP (ref 135–145)
WBC # BLD: 20.02 K/UL — HIGH (ref 3.8–10.5)
WBC # FLD AUTO: 20.02 K/UL — HIGH (ref 3.8–10.5)

## 2021-04-23 RX ORDER — INSULIN LISPRO 100/ML
4 VIAL (ML) SUBCUTANEOUS
Refills: 0 | Status: DISCONTINUED | OUTPATIENT
Start: 2021-04-23 | End: 2021-04-26

## 2021-04-23 RX ORDER — INSULIN GLARGINE 100 [IU]/ML
12 INJECTION, SOLUTION SUBCUTANEOUS AT BEDTIME
Refills: 0 | Status: DISCONTINUED | OUTPATIENT
Start: 2021-04-23 | End: 2021-04-24

## 2021-04-23 RX ADMIN — Medication 4: at 16:40

## 2021-04-23 RX ADMIN — SENNA PLUS 2 TABLET(S): 8.6 TABLET ORAL at 21:27

## 2021-04-23 RX ADMIN — INSULIN GLARGINE 12 UNIT(S): 100 INJECTION, SOLUTION SUBCUTANEOUS at 21:28

## 2021-04-23 RX ADMIN — Medication 4 UNIT(S): at 16:40

## 2021-04-23 RX ADMIN — ENOXAPARIN SODIUM 40 MILLIGRAM(S): 100 INJECTION SUBCUTANEOUS at 11:48

## 2021-04-23 RX ADMIN — Medication 500 MILLIGRAM(S): at 11:48

## 2021-04-23 RX ADMIN — Medication 6 MILLIGRAM(S): at 05:10

## 2021-04-23 RX ADMIN — Medication 4: at 11:47

## 2021-04-23 RX ADMIN — Medication 4 UNIT(S): at 11:48

## 2021-04-23 RX ADMIN — REMDESIVIR 500 MILLIGRAM(S): 5 INJECTION INTRAVENOUS at 21:27

## 2021-04-23 RX ADMIN — ZINC SULFATE TAB 220 MG (50 MG ZINC EQUIVALENT) 220 MILLIGRAM(S): 220 (50 ZN) TAB at 11:48

## 2021-04-23 NOTE — PROGRESS NOTE ADULT - SUBJECTIVE AND OBJECTIVE BOX
TMAX - 97.8    On day # 7 Remdesivir / # 7 Decadron    Vital Signs Last 24 Hrs  T(C): 36.4 (23 Apr 2021 16:33), Max: 36.7 (22 Apr 2021 17:04)  T(F): 97.6 (23 Apr 2021 16:33), Max: 98 (22 Apr 2021 17:04)  HR: 76 (23 Apr 2021 16:33) (55 - 79)  BP: 135/72 (23 Apr 2021 16:33) (125/61 - 146/66)  BP(mean): --  RR: 18 (23 Apr 2021 16:33) (18 - 20)  SpO2: 91% (23 Apr 2021 16:33) (91% - 97%)  Supplemental O2:  on NRB O2 now    Awake alert, no c/o cp and claims her cough is decreasing.   Feels less SOB with NRB O2 in place and O2 Sat's between 90 - 97%.  Appetite is fair and no GI upset reported.  Voiding well.    PHYSICAL EXAM  General:  69 y/o female awake, alert, sitting upright in the bed now,  pleasant and cooperative appears more comfortable with NRB O2 in place  HEENT:  conj pink, sclerae anicteric, PERRLA, no oral lesions noted but mucosa slightly dry  Neck:  supple, no nodes noted  Heart:  RR  Lungs:  decreased BS at the bases bilaterally but no wheezing noted  Abdomen:  soft, BS+, nontender to palpation, no masses or HS-megaly detected  Back: no CVA or Spinal tenderness elicited to palpation  Extremities:  trace edema LE's                     no calf tenderness noted  Skin:  warm, dry, no rash noted      I&O's Summary :    23 Apr 2021 07:01  -  23 Apr 2021 16:37  --------------------------------------------------------  IN: 575 mL / OUT: 0 mL / NET: 575 mL      LABS:  CBC Full  -  ( 23 Apr 2021 08:16 )  WBC Count : 20.02 K/uL  RBC Count : 3.94 M/uL  Hemoglobin : 11.9 g/dL  Hematocrit : 34.2 %  Platelet Count - Automated : 344 K/uL  Mean Cell Volume : 86.8 fl  Mean Cell Hemoglobin : 30.2 pg  Mean Cell Hemoglobin Concentration : 34.8 gm/dL  Auto Neutrophil # : x  Auto Lymphocyte # : x  Auto Monocyte # : x  Auto Eosinophil # : x  Auto Basophil # : x  Auto Neutrophil % : x  Auto Lymphocyte % : x  Auto Monocyte % : x  Auto Eosinophil % : x  Auto Basophil % : x    04-23    145  |  108  |  18  ----------------------------<  64<L>  3.8   |  32<H>  |  0.57    Ca    8.1<L>      23 Apr 2021 08:16    TPro  5.6<L>  /  Alb  2.2<L>  /  TBili  0.5  /  DBili  .14  /  AST  17  /  ALT  18  /  AlkPhos  87  04-23    LIVER FUNCTIONS - ( 23 Apr 2021 08:16 )  Alb: 2.2 g/dL / Pro: 5.6 gm/dL / ALK PHOS: 87 U/L / ALT: 18 U/L / AST: 17 U/L / GGT: x           Sedimentation Rate, Erythrocyte: 52 mm/hr (04-22 @ 08:04)  Sedimentation Rate, Erythrocyte: 25 mm/hr (04-20 @ 07:24)  Sedimentation Rate, Erythrocyte: 97 mm/hr (04-18 @ 07:08)    C-Reactive Protein, Serum (04.23.21 @ 10:04)    C-Reactive Protein, Serum: 78: Please note: Reference range has changed due to units of measure change. mg/L  C-Reactive Protein, Serum (04.22.21 @ 10:51)    C-Reactive Protein, Serum: 46: Please note: Reference range has changed due to units of measure change. mg/L  C-Reactive Protein, Serum (04.18.21 @ 10:47)    C-Reactive Protein, Serum: 226: Please note: Reference range has changed due to units of measure change. mg/L    Procalcitonin, Serum (04.22.21 @ 10:51)    Procalcitonin, Serum: 0.06:   Procalcitonin, Serum (04.18.21 @ 10:59)    Procalcitonin, Serum: 0.10:   Procalcitonin, Serum (04.18.21 @ 10:47)    Procalcitonin, Serum: 0.11  Procalcitonin, Serum (04.18.21 @ 01:25)    Procalcitonin, Serum: 0.08:    A1C with Estimated Average Glucose (04.19.21 @ 10:40)    A1C with Estimated Average Glucose Result: 13.5: Method: Immunoassay       Reference Range                4.0-5.6%       High risk (prediabetic)        5.7-6.4%       Diabetic, diagnostic             >=6.5%       ADA diabetic treatment goal       <7.0%    Estimated Average Glucose: 341      Ferritin, Serum in AM (04.23.21 @ 10:02)    Ferritin, Serum: 512 ng/mL  Ferritin, Serum in AM (04.22.21 @ 10:48)    Ferritin, Serum: 585 ng/mL  Ferritin, Serum in AM (04.20.21 @ 10:58)    Ferritin, Serum: 794 ng/mL  Ferritin, Serum (04.18.21 @ 01:26)    Ferritin, Serum: 612 ng/mL    Lactate, Blood (04.17.21 @ 21:55)    Lactate, Blood: 1.2 mmol/L  Lactate, Blood (04.17.21 @ 17:42)    Lactate, Blood: 1.3 mmol/L      Lactate Dehydrogenase, Serum in AM (04.23.21 @ 10:04)    Lactate Dehydrogenase, Serum: 479 U/L    Lactate Dehydrogenase, Serum (04.18.21 @ 10:59)    Lactate Dehydrogenase, Serum: 415 U/L    Lactate Dehydrogenase, Serum (04.18.21 @ 01:25)    Lactate Dehydrogenase, Serum: 479: Mild hemolysis. Results may be falsely elevated. U/L    D-Dimer Assay, Quantitative (04.23.21 @ 08:16)    D-Dimer Assay, Quantitative: 7816 ng/mL DDU    D-Dimer Assay, Quantitative (04.22.21 @ 08:04)    D-Dimer Assay, Quantitative: 6019 ng/mL DDU    D-Dimer Assay, Quantitative (04.19.21 @ 20:49)    D-Dimer Assay, Quantitative: 267 ng/mL DDU    D-Dimer Assay, Quantitative (04.18.21 @ 07:08)    D-Dimer Assay, Quantitative: 248 ng/mL DDU    D-Dimer Assay, Quantitative (04.17.21 @ 17:42)    D-Dimer Assay, Quantitative: 222 ng/mL DDU        MICROBIOLOGY:    COVID-19 Erwin Domain Antibody (04.18.21 @ 10:45)    COVID-19 Erwin Domain Antibody Result: 0.40: Roche ECLIA Total AB (AKILAH)  NOTE: This result index represents a total antibody measurement, which  includes IgG, IgA and IgM.  Measures Receptor Binding Domain of the Erwin Protein  Negative <= 0.79 U/mL  Positive  >= 0.80 U/mL U/mL    COVID-19 Erwin Domain AB Interp: Negative    Flu With COVID-19 By KELLI (04.17.21 @ 18:31)    SARS-CoV-2 Result: Detected: EUA/IVD    Influenza A Result: NotDetec: EUA/IVD    Influenza B Result: NotDetec: EUA/IVD      RADIOLOGY:         < from: CT Angio Chest w/ IV Cont (04.19.21 @ 19:01) >  EXAM:  CT ANGIO CHEST (W)AW IC                        PROCEDURE DATE:  04/19/2021    INTERPRETATION:  CLINICAL INFORMATION: Shortness of breath. COVID positive.  COMPARISON: Chest x-ray of 4/17/2021.  CONTRAST/COMPLICATIONS:  IV Contrast: Omnipaque 350  60 cc administered   40 cc discarded  Oral Contrast: NONE  Complications: None reported at time of study completion  PROCEDURE:  CT Angiography of the Chest.  Sagittal and coronal reformats were performed as well as 3D (MIP) reconstructions.  FINDINGS:  LUNGS AND AIRWAYS: Patent central airways.  The lungs demonstrate bilateral multi focal groundglass opacities most prominent in the midlungs suggestive of atypical pneumonia such as that from COVID 19. Punctate calcified left lower lobe granuloma.  PLEURA: No pleural effusion.  MEDIASTINUM AND JUNIOR: No lymphadenopathy. Punctate left hilar calcification  VESSELS: There is atherosclerotic calcification of the aorta  HEART: Heart size is normal. No pericardial effusion.  CHEST WALL AND LOWER NECK: Within normal limits.  VISUALIZED UPPER ABDOMEN: Within normal limits.  BONES: Degenerative change of the spine  IMPRESSION: No evidence of pulmonary embolism  Bilateral multifocal infiltrates suggestive of pneumonia associated with COVID 19. Lines hilar calcifications and calcified left lower lobe granuloma consistent with old granulomatous disease                     < from: Xray Chest 1 View- PORTABLE-Urgent (04.17.21 @ 17:26) >  EXAM:  XR CHEST PORTABLE URGENT 1V                        PROCEDURE DATE:  04/17/2021    INTERPRETATION:  Chest one view  HISTORY: Shortness of breath, cough and fever  COMPARISON STUDY: None available  Frontal expiratory view of the chest shows the heart to be slightly enlarged in size. The lungs show bilateral patchy pulmonary infiltrates and there is no evidence of pneumothorax nor pleural effusion.  IMPRESSION:  Patchy infiltrates. Differential diagnosis includes Covid-19 pneumonia among other possibilities.        Impression:   69 y/o female with hx of DM reportedly diet- controlled but otherwise in good health, admitted via the ER on 4/17/21 with c/o worsening SOB and cough with associated pleuritic -type cp over the prior few days after testing positive for COVID-19 by PCR a few days PTA and noted by her niece who is an RN to have decreased O2 Sat to 84% at home.  In the ER patient was begun on 3 Liters NC O2 and rx with Remdesivir and Decadron was begun.  Initial CXR revealed bilateral patchy pulmonary infiltrates consistent with dx of COVID-19 Multifocal PNA.   CT Angio of the Chest was done on 4/19/21 and confirmed  Bilateral Multifocal Infiltrates but no evidence of PE, but with evidence of old granulomatous disease involving the LLL. O2 Sat's have remained low and patient now requiring NRB O2.  WBC's are elevated - likely secondary to steroids.  Further w/u with HGB A1C was found to be very elevated consistent with poorly-controlled DM at home.  Now receiving Insulin rx for improved control of her DM.  Noted with increasing D-dimer, ESR, and CRP, along with elevated WBC's aver the past few days.    Suggestions:  Will therefore continue current rx with Remdesivir and Decadron in view of continued/ worsening hypoxia.  Follow-up temps and labs closely.  Maintain Airborne/ Contact Isolation for COVID-19 Multifocal PNA.  OOB to chair as tolerated.  Discussed in detail with patient at the bedside.                                           TMAX - 97.8    On day # 7 Remdesivir / # 7 Decadron    Vital Signs Last 24 Hrs  T(C): 36.4 (23 Apr 2021 16:33), Max: 36.7 (22 Apr 2021 17:04)  T(F): 97.6 (23 Apr 2021 16:33), Max: 98 (22 Apr 2021 17:04)  HR: 76 (23 Apr 2021 16:33) (55 - 79)  BP: 135/72 (23 Apr 2021 16:33) (125/61 - 146/66)  BP(mean): --  RR: 18 (23 Apr 2021 16:33) (18 - 20)  SpO2: 91% (23 Apr 2021 16:33) (91% - 97%)  Supplemental O2:  on NRB O2 now    Awake alert, no c/o cp and claims her cough is decreasing.   Feels less SOB with NRB O2 in place and O2 Sat's between 90 - 97%.  Appetite is fair and no GI upset reported.  Voiding well.    PHYSICAL EXAM  General:  71 y/o female awake, alert, sitting upright in the bed now,  pleasant and cooperative appears more comfortable with NRB O2 in place  HEENT:  conj pink, sclerae anicteric, PERRLA, no oral lesions noted but mucosa slightly dry  Neck:  supple, no nodes noted  Heart:  RR  Lungs:  decreased BS at the bases bilaterally but no wheezing noted  Abdomen:  soft, BS+, nontender to palpation, no masses or HS-megaly detected  Back: no CVA or Spinal tenderness elicited to palpation  Extremities:  trace edema LE's                     no calf tenderness noted  Skin:  warm, dry, no rash noted      I&O's Summary :    23 Apr 2021 07:01  -  23 Apr 2021 16:37  --------------------------------------------------------  IN: 575 mL / OUT: 0 mL / NET: 575 mL      LABS:  CBC Full  -  ( 23 Apr 2021 08:16 )  WBC Count : 20.02 K/uL  RBC Count : 3.94 M/uL  Hemoglobin : 11.9 g/dL  Hematocrit : 34.2 %  Platelet Count - Automated : 344 K/uL  Mean Cell Volume : 86.8 fl  Mean Cell Hemoglobin : 30.2 pg  Mean Cell Hemoglobin Concentration : 34.8 gm/dL  Auto Neutrophil # : x  Auto Lymphocyte # : x  Auto Monocyte # : x  Auto Eosinophil # : x  Auto Basophil # : x  Auto Neutrophil % : x  Auto Lymphocyte % : x  Auto Monocyte % : x  Auto Eosinophil % : x  Auto Basophil % : x    04-23    145  |  108  |  18  ----------------------------<  64<L>  3.8   |  32<H>  |  0.57    Ca    8.1<L>      23 Apr 2021 08:16    TPro  5.6<L>  /  Alb  2.2<L>  /  TBili  0.5  /  DBili  .14  /  AST  17  /  ALT  18  /  AlkPhos  87  04-23    LIVER FUNCTIONS - ( 23 Apr 2021 08:16 )  Alb: 2.2 g/dL / Pro: 5.6 gm/dL / ALK PHOS: 87 U/L / ALT: 18 U/L / AST: 17 U/L / GGT: x           Sedimentation Rate, Erythrocyte: 52 mm/hr (04-22 @ 08:04)  Sedimentation Rate, Erythrocyte: 25 mm/hr (04-20 @ 07:24)  Sedimentation Rate, Erythrocyte: 97 mm/hr (04-18 @ 07:08)    C-Reactive Protein, Serum (04.23.21 @ 10:04)    C-Reactive Protein, Serum: 78: Please note: Reference range has changed due to units of measure change. mg/L  C-Reactive Protein, Serum (04.22.21 @ 10:51)    C-Reactive Protein, Serum: 46: Please note: Reference range has changed due to units of measure change. mg/L  C-Reactive Protein, Serum (04.18.21 @ 10:47)    C-Reactive Protein, Serum: 226: Please note: Reference range has changed due to units of measure change. mg/L    Procalcitonin, Serum (04.22.21 @ 10:51)    Procalcitonin, Serum: 0.06:   Procalcitonin, Serum (04.18.21 @ 10:59)    Procalcitonin, Serum: 0.10:   Procalcitonin, Serum (04.18.21 @ 10:47)    Procalcitonin, Serum: 0.11  Procalcitonin, Serum (04.18.21 @ 01:25)    Procalcitonin, Serum: 0.08:    A1C with Estimated Average Glucose (04.19.21 @ 10:40)    A1C with Estimated Average Glucose Result: 13.5: Method: Immunoassay       Reference Range                4.0-5.6%       High risk (prediabetic)        5.7-6.4%       Diabetic, diagnostic             >=6.5%       ADA diabetic treatment goal       <7.0%    Estimated Average Glucose: 341      Ferritin, Serum in AM (04.23.21 @ 10:02)    Ferritin, Serum: 512 ng/mL  Ferritin, Serum in AM (04.22.21 @ 10:48)    Ferritin, Serum: 585 ng/mL  Ferritin, Serum in AM (04.20.21 @ 10:58)    Ferritin, Serum: 794 ng/mL  Ferritin, Serum (04.18.21 @ 01:26)    Ferritin, Serum: 612 ng/mL    Lactate, Blood (04.17.21 @ 21:55)    Lactate, Blood: 1.2 mmol/L  Lactate, Blood (04.17.21 @ 17:42)    Lactate, Blood: 1.3 mmol/L      Lactate Dehydrogenase, Serum in AM (04.23.21 @ 10:04)    Lactate Dehydrogenase, Serum: 479 U/L    Lactate Dehydrogenase, Serum (04.18.21 @ 10:59)    Lactate Dehydrogenase, Serum: 415 U/L    Lactate Dehydrogenase, Serum (04.18.21 @ 01:25)    Lactate Dehydrogenase, Serum: 479: Mild hemolysis. Results may be falsely elevated. U/L    D-Dimer Assay, Quantitative (04.23.21 @ 08:16)    D-Dimer Assay, Quantitative: 7816 ng/mL DDU    D-Dimer Assay, Quantitative (04.22.21 @ 08:04)    D-Dimer Assay, Quantitative: 6019 ng/mL DDU    D-Dimer Assay, Quantitative (04.19.21 @ 20:49)    D-Dimer Assay, Quantitative: 267 ng/mL DDU    D-Dimer Assay, Quantitative (04.18.21 @ 07:08)    D-Dimer Assay, Quantitative: 248 ng/mL DDU    D-Dimer Assay, Quantitative (04.17.21 @ 17:42)    D-Dimer Assay, Quantitative: 222 ng/mL DDU        MICROBIOLOGY:    COVID-19 Erwin Domain Antibody (04.18.21 @ 10:45)    COVID-19 Erwin Domain Antibody Result: 0.40: Roche ECLIA Total AB (AKILAH)  NOTE: This result index represents a total antibody measurement, which  includes IgG, IgA and IgM.  Measures Receptor Binding Domain of the Erwin Protein  Negative <= 0.79 U/mL  Positive  >= 0.80 U/mL U/mL    COVID-19 Erwin Domain AB Interp: Negative    Flu With COVID-19 By KELLI (04.17.21 @ 18:31)    SARS-CoV-2 Result: Detected: EUA/IVD    Influenza A Result: NotDetec: EUA/IVD    Influenza B Result: NotDetec: EUA/IVD      RADIOLOGY:         < from: CT Angio Chest w/ IV Cont (04.19.21 @ 19:01) >  EXAM:  CT ANGIO CHEST (W)AW IC                        PROCEDURE DATE:  04/19/2021    INTERPRETATION:  CLINICAL INFORMATION: Shortness of breath. COVID positive.  COMPARISON: Chest x-ray of 4/17/2021.  CONTRAST/COMPLICATIONS:  IV Contrast: Omnipaque 350  60 cc administered   40 cc discarded  Oral Contrast: NONE  Complications: None reported at time of study completion  PROCEDURE:  CT Angiography of the Chest.  Sagittal and coronal reformats were performed as well as 3D (MIP) reconstructions.  FINDINGS:  LUNGS AND AIRWAYS: Patent central airways.  The lungs demonstrate bilateral multi focal groundglass opacities most prominent in the midlungs suggestive of atypical pneumonia such as that from COVID 19. Punctate calcified left lower lobe granuloma.  PLEURA: No pleural effusion.  MEDIASTINUM AND JUNIOR: No lymphadenopathy. Punctate left hilar calcification  VESSELS: There is atherosclerotic calcification of the aorta  HEART: Heart size is normal. No pericardial effusion.  CHEST WALL AND LOWER NECK: Within normal limits.  VISUALIZED UPPER ABDOMEN: Within normal limits.  BONES: Degenerative change of the spine  IMPRESSION: No evidence of pulmonary embolism  Bilateral multifocal infiltrates suggestive of pneumonia associated with COVID 19. Lines hilar calcifications and calcified left lower lobe granuloma consistent with old granulomatous disease                     < from: Xray Chest 1 View- PORTABLE-Urgent (04.17.21 @ 17:26) >  EXAM:  XR CHEST PORTABLE URGENT 1V                        PROCEDURE DATE:  04/17/2021    INTERPRETATION:  Chest one view  HISTORY: Shortness of breath, cough and fever  COMPARISON STUDY: None available  Frontal expiratory view of the chest shows the heart to be slightly enlarged in size. The lungs show bilateral patchy pulmonary infiltrates and there is no evidence of pneumothorax nor pleural effusion.  IMPRESSION:  Patchy infiltrates. Differential diagnosis includes Covid-19 pneumonia among other possibilities.        Impression:   71 y/o female with hx of DM reportedly diet- controlled but otherwise in good health, admitted via the ER on 4/17/21 with c/o worsening SOB and cough with associated pleuritic -type cp over the prior few days after testing positive for COVID-19 by PCR a few days PTA and noted by her niece who is an RN to have decreased O2 Sat to 84% at home.  In the ER patient was begun on 3 Liters NC O2 and rx with Remdesivir and Decadron was begun.  Initial CXR revealed bilateral patchy pulmonary infiltrates consistent with dx of COVID-19 Multifocal PNA.   CT Angio of the Chest was done on 4/19/21 and confirmed  Bilateral Multifocal Infiltrates but no evidence of PE, but with evidence of old granulomatous disease involving the LLL. O2 Sat's have remained low and patient now requiring NRB O2.  WBC's are elevated - likely secondary to steroids.  Further w/u with HGB A1C was found to be very elevated consistent with poorly-controlled DM at home.  Now receiving Insulin rx for improved control of her DM.  Noted with increasing D-dimer, ESR, and CRP, along with elevated WBC's over the past few days.    Suggestions:  Will therefore continue current rx with Remdesivir and Decadron in view of continued/ worsening hypoxia.  Follow-up temps and labs closely.  Maintain Airborne/ Contact Isolation for COVID-19 Multifocal PNA.  OOB to chair as tolerated.  Discussed in detail with patient at the bedside.

## 2021-04-23 NOTE — PROGRESS NOTE ADULT - SUBJECTIVE AND OBJECTIVE BOX
Patient is a 70y old  Female who presents with a chief complaint of covid PNA with Hypoxia (22 Apr 2021 10:32)  comfortable on non rebreather  15 litres   vitals stable   no complaints   labs reviewed   ferritin , crp, d dimers still elevated.     INTERVAL HPI/OVERNIGHT EVENTS:  PAST MEDICAL & SURGICAL HISTORY:      MEDICATIONS  (STANDING):  ascorbic acid 500 milliGRAM(s) Oral daily  dexAMETHasone  Injectable 6 milliGRAM(s) IV Push daily  dextrose 40% Gel 15 Gram(s) Oral once  dextrose 5%. 1000 milliLiter(s) (50 mL/Hr) IV Continuous <Continuous>  dextrose 5%. 1000 milliLiter(s) (100 mL/Hr) IV Continuous <Continuous>  dextrose 50% Injectable 25 Gram(s) IV Push once  dextrose 50% Injectable 12.5 Gram(s) IV Push once  dextrose 50% Injectable 25 Gram(s) IV Push once  enoxaparin Injectable 40 milliGRAM(s) SubCutaneous daily  glucagon  Injectable 1 milliGRAM(s) IntraMuscular once  insulin glargine Injectable (LANTUS) 16 Unit(s) SubCutaneous at bedtime  insulin lispro (ADMELOG) corrective regimen sliding scale   SubCutaneous three times a day before meals  insulin lispro (ADMELOG) corrective regimen sliding scale   SubCutaneous at bedtime  remdesivir  IVPB 100 milliGRAM(s) IV Intermittent every 24 hours  senna 2 Tablet(s) Oral at bedtime  zinc sulfate 220 milliGRAM(s) Oral daily    MEDICATIONS  (PRN):  acetaminophen   Tablet .. 650 milliGRAM(s) Oral every 4 hours PRN Temp greater or equal to 38.5C (101.3F)  magnesium hydroxide Suspension 30 milliLiter(s) Oral daily PRN Constipation      Allergies    Allergy Status Unknown    Intolerances        REVIEW OF SYSTEMS:  CONSTITUTIONAL: No fever, weight loss, or fatigue  EYES: No eye pain, visual disturbances, or discharge  ENMT:  No difficulty hearing, tinnitus, vertigo; No sinus or throat pain  NECK: No pain or stiffness  BREASTS: No pain, masses, or nipple discharge  RESPIRATORY: No cough, wheezing, chills or hemoptysis; No shortness of breath  CARDIOVASCULAR: No chest pain, palpitations, dizziness, or leg swelling  GASTROINTESTINAL: No abdominal or epigastric pain. No nausea, vomiting, or hematemesis; No diarrhea or constipation. No melena or hematochezia.  GENITOURINARY: No dysuria, frequency, hematuria, or incontinence  NEUROLOGICAL: No headaches, memory loss, loss of strength, numbness, or tremors  SKIN: No itching, burning, rashes, or lesions   LYMPH NODES: No enlarged glands  ENDOCRINE: No heat or cold intolerance; No hair loss  MUSCULOSKELETAL: No joint pain or swelling; No muscle, back, or extremity pain  PSYCHIATRIC: No depression, anxiety, mood swings, or difficulty sleeping  HEME/LYMPH: No easy bruising, or bleeding gums  ALLERY AND IMMUNOLOGIC: No hives or eczema    Vital Signs Last 24 Hrs  T(C): 36.6 (23 Apr 2021 05:09), Max: 36.7 (22 Apr 2021 17:04)  T(F): 97.8 (23 Apr 2021 05:09), Max: 98 (22 Apr 2021 17:04)  HR: 61 (23 Apr 2021 07:24) (55 - 75)  BP: 145/70 (23 Apr 2021 05:09) (137/60 - 146/66)  BP(mean): --  RR: 20 (23 Apr 2021 08:20) (19 - 20)  SpO2: 95% (23 Apr 2021 07:24) (91% - 97%)    PHYSICAL EXAM:  GENERAL: NAD, well-groomed, well-developed  HEAD:  Atraumatic, Normocephalic  EYES: EOMI, PERRLA, conjunctiva and sclera clear  ENMT: No tonsillar erythema, exudates, or enlargement; Moist mucous membranes, Good dentition, No lesions  NECK: Supple, No JVD, Normal thyroid  NERVOUS SYSTEM:  Alert & Oriented X3, Good concentration; Motor Strength 5/5 B/L upper and lower extremities; DTRs 2+ intact and symmetric  CHEST/LUNG: Clear to percussion bilaterally; No rales, rhonchi, wheezing, or rubs  HEART: Regular rate and rhythm; No murmurs, rubs, or gallops  ABDOMEN: Soft, Nontender, Nondistended; Bowel sounds present  EXTREMITIES:  2+ Peripheral Pulses, No clubbing, cyanosis, or edema  LYMPH: No lymphadenopathy noted  SKIN: No rashes or lesions    LABS:                        11.9   20.02 )-----------( 344      ( 23 Apr 2021 08:16 )             34.2     04-23    145  |  108  |  18  ----------------------------<  64<L>  3.8   |  32<H>  |  0.57    Ca    8.1<L>      23 Apr 2021 08:16    TPro  5.6<L>  /  Alb  2.2<L>  /  TBili  0.5  /  DBili  .14  /  AST  17  /  ALT  18  /  AlkPhos  87  04-23          CAPILLARY BLOOD GLUCOSE      POCT Blood Glucose.: 178 mg/dL (23 Apr 2021 09:51)  POCT Blood Glucose.: 86 mg/dL (23 Apr 2021 08:16)  POCT Blood Glucose.: 243 mg/dL (22 Apr 2021 21:46)  POCT Blood Glucose.: 266 mg/dL (22 Apr 2021 16:19)  POCT Blood Glucose.: 244 mg/dL (22 Apr 2021 11:23)                RADIOLOGY & ADDITIONAL TESTS:    Imaging Personally Reviewed:  [ ] YES  [ ] NO    Consultant(s) Notes Reviewed:  [ ] YES  [ ] NO    Care Discussed with Consultants/Other Providers [ ] YES  [ ] NO    Care discussed with family,         [  ]   yes  [  ]  No    imp:    stable[ ]    unstable[  ]     improving [x   ]       unchanged  [  ]                Plans:  Continue present plans  [x  ] as per ID/ pul               New consult [  ]   specialty  .......               order test[  ]    test name.                  Discharge Planning  [  ]            Patient is a 70y old  Female who presents with a chief complaint of covid PNA with Hypoxia (22 Apr 2021 10:32)  comfortable on non rebreather  15 litres   vitals stable   no complaints   labs reviewed   ferritin , crp, d dimers still elevated.    still on Remdesivir and dex    INTERVAL HPI/OVERNIGHT EVENTS:  PAST MEDICAL & SURGICAL HISTORY:      MEDICATIONS  (STANDING):  ascorbic acid 500 milliGRAM(s) Oral daily  dexAMETHasone  Injectable 6 milliGRAM(s) IV Push daily  dextrose 40% Gel 15 Gram(s) Oral once  dextrose 5%. 1000 milliLiter(s) (50 mL/Hr) IV Continuous <Continuous>  dextrose 5%. 1000 milliLiter(s) (100 mL/Hr) IV Continuous <Continuous>  dextrose 50% Injectable 25 Gram(s) IV Push once  dextrose 50% Injectable 12.5 Gram(s) IV Push once  dextrose 50% Injectable 25 Gram(s) IV Push once  enoxaparin Injectable 40 milliGRAM(s) SubCutaneous daily  glucagon  Injectable 1 milliGRAM(s) IntraMuscular once  insulin glargine Injectable (LANTUS) 16 Unit(s) SubCutaneous at bedtime  insulin lispro (ADMELOG) corrective regimen sliding scale   SubCutaneous three times a day before meals  insulin lispro (ADMELOG) corrective regimen sliding scale   SubCutaneous at bedtime  remdesivir  IVPB 100 milliGRAM(s) IV Intermittent every 24 hours  senna 2 Tablet(s) Oral at bedtime  zinc sulfate 220 milliGRAM(s) Oral daily    MEDICATIONS  (PRN):  acetaminophen   Tablet .. 650 milliGRAM(s) Oral every 4 hours PRN Temp greater or equal to 38.5C (101.3F)  magnesium hydroxide Suspension 30 milliLiter(s) Oral daily PRN Constipation      Allergies    Allergy Status Unknown    Intolerances        REVIEW OF SYSTEMS:  CONSTITUTIONAL: No fever, weight loss, or fatigue  EYES: No eye pain, visual disturbances, or discharge  ENMT:  No difficulty hearing, tinnitus, vertigo; No sinus or throat pain  NECK: No pain or stiffness  BREASTS: No pain, masses, or nipple discharge  RESPIRATORY: No cough, wheezing, chills or hemoptysis; No shortness of breath  CARDIOVASCULAR: No chest pain, palpitations, dizziness, or leg swelling  GASTROINTESTINAL: No abdominal or epigastric pain. No nausea, vomiting, or hematemesis; No diarrhea or constipation. No melena or hematochezia.  GENITOURINARY: No dysuria, frequency, hematuria, or incontinence  NEUROLOGICAL: No headaches, memory loss, loss of strength, numbness, or tremors  SKIN: No itching, burning, rashes, or lesions   LYMPH NODES: No enlarged glands  ENDOCRINE: No heat or cold intolerance; No hair loss  MUSCULOSKELETAL: No joint pain or swelling; No muscle, back, or extremity pain  PSYCHIATRIC: No depression, anxiety, mood swings, or difficulty sleeping  HEME/LYMPH: No easy bruising, or bleeding gums  ALLERY AND IMMUNOLOGIC: No hives or eczema    Vital Signs Last 24 Hrs  T(C): 36.6 (23 Apr 2021 05:09), Max: 36.7 (22 Apr 2021 17:04)  T(F): 97.8 (23 Apr 2021 05:09), Max: 98 (22 Apr 2021 17:04)  HR: 61 (23 Apr 2021 07:24) (55 - 75)  BP: 145/70 (23 Apr 2021 05:09) (137/60 - 146/66)  BP(mean): --  RR: 20 (23 Apr 2021 08:20) (19 - 20)  SpO2: 95% (23 Apr 2021 07:24) (91% - 97%)    PHYSICAL EXAM:  GENERAL: NAD, well-groomed, well-developed  HEAD:  Atraumatic, Normocephalic  EYES: EOMI, PERRLA, conjunctiva and sclera clear  ENMT: No tonsillar erythema, exudates, or enlargement; Moist mucous membranes, Good dentition, No lesions  NECK: Supple, No JVD, Normal thyroid  NERVOUS SYSTEM:  Alert & Oriented X3, Good concentration; Motor Strength 5/5 B/L upper and lower extremities; DTRs 2+ intact and symmetric  CHEST/LUNG: Clear to percussion bilaterally; No rales, rhonchi, wheezing, or rubs  HEART: Regular rate and rhythm; No murmurs, rubs, or gallops  ABDOMEN: Soft, Nontender, Nondistended; Bowel sounds present  EXTREMITIES:  2+ Peripheral Pulses, No clubbing, cyanosis, or edema  LYMPH: No lymphadenopathy noted  SKIN: No rashes or lesions    LABS:                        11.9   20.02 )-----------( 344      ( 23 Apr 2021 08:16 )             34.2     04-23    145  |  108  |  18  ----------------------------<  64<L>  3.8   |  32<H>  |  0.57    Ca    8.1<L>      23 Apr 2021 08:16    TPro  5.6<L>  /  Alb  2.2<L>  /  TBili  0.5  /  DBili  .14  /  AST  17  /  ALT  18  /  AlkPhos  87  04-23          CAPILLARY BLOOD GLUCOSE      POCT Blood Glucose.: 178 mg/dL (23 Apr 2021 09:51)  POCT Blood Glucose.: 86 mg/dL (23 Apr 2021 08:16)  POCT Blood Glucose.: 243 mg/dL (22 Apr 2021 21:46)  POCT Blood Glucose.: 266 mg/dL (22 Apr 2021 16:19)  POCT Blood Glucose.: 244 mg/dL (22 Apr 2021 11:23)                RADIOLOGY & ADDITIONAL TESTS:    Imaging Personally Reviewed:  [ ] YES  [ ] NO    Consultant(s) Notes Reviewed:  [ ] YES  [ ] NO    Care Discussed with Consultants/Other Providers [ ] YES  [ ] NO    Care discussed with family,         [  ]   yes  [  ]  No    imp:    stable[ ]    unstable[  ]     improving [x   ]       unchanged  [  ]                Plans:  Continue present plans  [x  ] as per ID/ pul               New consult [  ]   specialty  .......               order test[  ]    test name.                  Discharge Planning  [  ]

## 2021-04-23 NOTE — PROGRESS NOTE ADULT - ASSESSMENT
slight improvement   covid PNA witrh Hypoxia. . finished Remdesivir, still on  dex.   slight improvement   covid PNA witrh Hypoxia. .  still on Remdesivir and dex.- part of study group.

## 2021-04-24 LAB
ALBUMIN SERPL ELPH-MCNC: 2.4 G/DL — LOW (ref 3.3–5)
ALP SERPL-CCNC: 105 U/L — SIGNIFICANT CHANGE UP (ref 40–120)
ALT FLD-CCNC: 20 U/L — SIGNIFICANT CHANGE UP (ref 12–78)
ANION GAP SERPL CALC-SCNC: 6 MMOL/L — SIGNIFICANT CHANGE UP (ref 5–17)
AST SERPL-CCNC: 25 U/L — SIGNIFICANT CHANGE UP (ref 15–37)
BASOPHILS # BLD AUTO: 0 K/UL — SIGNIFICANT CHANGE UP (ref 0–0.2)
BASOPHILS NFR BLD AUTO: 0 % — SIGNIFICANT CHANGE UP (ref 0–2)
BILIRUB DIRECT SERPL-MCNC: 0.18 MG/DL — SIGNIFICANT CHANGE UP (ref 0.05–0.2)
BILIRUB INDIRECT FLD-MCNC: 0.4 MG/DL — SIGNIFICANT CHANGE UP (ref 0.2–1)
BILIRUB SERPL-MCNC: 0.6 MG/DL — SIGNIFICANT CHANGE UP (ref 0.2–1.2)
BUN SERPL-MCNC: 17 MG/DL — SIGNIFICANT CHANGE UP (ref 7–23)
CALCIUM SERPL-MCNC: 8.3 MG/DL — LOW (ref 8.5–10.1)
CHLORIDE SERPL-SCNC: 106 MMOL/L — SIGNIFICANT CHANGE UP (ref 96–108)
CO2 SERPL-SCNC: 29 MMOL/L — SIGNIFICANT CHANGE UP (ref 22–31)
CREAT SERPL-MCNC: 0.62 MG/DL — SIGNIFICANT CHANGE UP (ref 0.5–1.3)
CRP SERPL-MCNC: 101 MG/L — HIGH
D DIMER BLD IA.RAPID-MCNC: HIGH NG/ML DDU
EOSINOPHIL # BLD AUTO: 0 K/UL — SIGNIFICANT CHANGE UP (ref 0–0.5)
EOSINOPHIL NFR BLD AUTO: 0 % — SIGNIFICANT CHANGE UP (ref 0–6)
ERYTHROCYTE [SEDIMENTATION RATE] IN BLOOD: 48 MM/HR — HIGH (ref 0–20)
FERRITIN SERPL-MCNC: 628 NG/ML — HIGH (ref 15–150)
GLUCOSE BLDC GLUCOMTR-MCNC: 114 MG/DL — HIGH (ref 70–99)
GLUCOSE BLDC GLUCOMTR-MCNC: 147 MG/DL — HIGH (ref 70–99)
GLUCOSE BLDC GLUCOMTR-MCNC: 158 MG/DL — HIGH (ref 70–99)
GLUCOSE BLDC GLUCOMTR-MCNC: 174 MG/DL — HIGH (ref 70–99)
GLUCOSE BLDC GLUCOMTR-MCNC: 371 MG/DL — HIGH (ref 70–99)
GLUCOSE BLDC GLUCOMTR-MCNC: 63 MG/DL — LOW (ref 70–99)
GLUCOSE SERPL-MCNC: 58 MG/DL — LOW (ref 70–99)
HCT VFR BLD CALC: 41.3 % — SIGNIFICANT CHANGE UP (ref 34.5–45)
HGB BLD-MCNC: 13.8 G/DL — SIGNIFICANT CHANGE UP (ref 11.5–15.5)
LDH SERPL L TO P-CCNC: 470 U/L — HIGH (ref 50–242)
LYMPHOCYTES # BLD AUTO: 15 % — SIGNIFICANT CHANGE UP (ref 13–44)
LYMPHOCYTES # BLD AUTO: 3.91 K/UL — HIGH (ref 1–3.3)
MANUAL SMEAR VERIFICATION: SIGNIFICANT CHANGE UP
MCHC RBC-ENTMCNC: 29.6 PG — SIGNIFICANT CHANGE UP (ref 27–34)
MCHC RBC-ENTMCNC: 33.4 GM/DL — SIGNIFICANT CHANGE UP (ref 32–36)
MCV RBC AUTO: 88.6 FL — SIGNIFICANT CHANGE UP (ref 80–100)
MONOCYTES # BLD AUTO: 0.52 K/UL — SIGNIFICANT CHANGE UP (ref 0–0.9)
MONOCYTES NFR BLD AUTO: 2 % — SIGNIFICANT CHANGE UP (ref 2–14)
MYELOCYTES NFR BLD: 1 % — HIGH (ref 0–0)
NEUTROPHILS # BLD AUTO: 21.35 K/UL — HIGH (ref 1.8–7.4)
NEUTROPHILS NFR BLD AUTO: 82 % — HIGH (ref 43–77)
NRBC # BLD: 0 /100 — SIGNIFICANT CHANGE UP (ref 0–0)
NRBC # BLD: SIGNIFICANT CHANGE UP /100 WBCS (ref 0–0)
PLAT MORPH BLD: NORMAL — SIGNIFICANT CHANGE UP
PLATELET # BLD AUTO: 303 K/UL — SIGNIFICANT CHANGE UP (ref 150–400)
POTASSIUM SERPL-MCNC: 3.2 MMOL/L — LOW (ref 3.5–5.3)
POTASSIUM SERPL-SCNC: 3.2 MMOL/L — LOW (ref 3.5–5.3)
PROT SERPL-MCNC: 6.6 GM/DL — SIGNIFICANT CHANGE UP (ref 6–8.3)
RBC # BLD: 4.66 M/UL — SIGNIFICANT CHANGE UP (ref 3.8–5.2)
RBC # FLD: 12.6 % — SIGNIFICANT CHANGE UP (ref 10.3–14.5)
RBC BLD AUTO: NORMAL — SIGNIFICANT CHANGE UP
SODIUM SERPL-SCNC: 141 MMOL/L — SIGNIFICANT CHANGE UP (ref 135–145)
WBC # BLD: 26.04 K/UL — HIGH (ref 3.8–10.5)
WBC # FLD AUTO: 26.04 K/UL — HIGH (ref 3.8–10.5)

## 2021-04-24 RX ORDER — INSULIN GLARGINE 100 [IU]/ML
20 INJECTION, SOLUTION SUBCUTANEOUS AT BEDTIME
Refills: 0 | Status: DISCONTINUED | OUTPATIENT
Start: 2021-04-24 | End: 2021-04-26

## 2021-04-24 RX ORDER — ENOXAPARIN SODIUM 100 MG/ML
60 INJECTION SUBCUTANEOUS EVERY 12 HOURS
Refills: 0 | Status: DISCONTINUED | OUTPATIENT
Start: 2021-04-24 | End: 2021-05-03

## 2021-04-24 RX ORDER — POTASSIUM CHLORIDE 20 MEQ
40 PACKET (EA) ORAL ONCE
Refills: 0 | Status: COMPLETED | OUTPATIENT
Start: 2021-04-24 | End: 2021-04-24

## 2021-04-24 RX ADMIN — ZINC SULFATE TAB 220 MG (50 MG ZINC EQUIVALENT) 220 MILLIGRAM(S): 220 (50 ZN) TAB at 11:19

## 2021-04-24 RX ADMIN — Medication 40 MILLIEQUIVALENT(S): at 12:43

## 2021-04-24 RX ADMIN — Medication 4 UNIT(S): at 16:45

## 2021-04-24 RX ADMIN — REMDESIVIR 500 MILLIGRAM(S): 5 INJECTION INTRAVENOUS at 22:47

## 2021-04-24 RX ADMIN — Medication 6 MILLIGRAM(S): at 05:05

## 2021-04-24 RX ADMIN — ENOXAPARIN SODIUM 40 MILLIGRAM(S): 100 INJECTION SUBCUTANEOUS at 11:19

## 2021-04-24 RX ADMIN — Medication 10: at 11:19

## 2021-04-24 RX ADMIN — SENNA PLUS 2 TABLET(S): 8.6 TABLET ORAL at 22:47

## 2021-04-24 RX ADMIN — Medication 4 UNIT(S): at 11:19

## 2021-04-24 RX ADMIN — INSULIN GLARGINE 20 UNIT(S): 100 INJECTION, SOLUTION SUBCUTANEOUS at 22:49

## 2021-04-24 RX ADMIN — Medication 500 MILLIGRAM(S): at 11:19

## 2021-04-24 NOTE — PROGRESS NOTE ADULT - SUBJECTIVE AND OBJECTIVE BOX
TMAX - 98    On day # 8 Remdesivir / # 8 Decadron    Vital Signs Last 24 Hrs  T(C): 36.5 (24 Apr 2021 16:22), Max: 36.7 (24 Apr 2021 10:35)  T(F): 97.7 (24 Apr 2021 16:22), Max: 98 (24 Apr 2021 10:35)  HR: 78 (24 Apr 2021 16:22) (59 - 86)  BP: 133/78 (24 Apr 2021 16:22) (119/60 - 135/76)  BP(mean): --  RR: 18 (24 Apr 2021 16:22) (18 - 18)  SpO2: 94% (24 Apr 2021 16:22) (90% - 99%)  Supplemental O2:  on NRB O2    Awake, alert, no c/o cp or abdominal pain now but c/o continued labored breathing - using her NRB O2 with O2 Sat's noted to range from 90 - 97%.  Appetite is fair and no furthering coughing reported.  Has been sitting up in the chair for a little while and then plans to go back into the bed to rest.  Trying to exercise her legs while sitting up.      PHYSICAL EXAM  General:  69 y/o female awake, alert, sitting up in the chair now,  pleasant and cooperative appears very fatigued with NRB O2 in place, but in NAD  HEENT:  conj pink, sclerae anicteric, PERRLA, no oral lesions noted but mucosa slightly dry  Neck:  supple, no nodes noted  Heart:  RR  Lungs:  decreased BS at the bases bilaterally otherwise clear  Abdomen:  soft, BS+, nontender to palpation, no masses or HS-megaly detected  Back: no CVA or Spinal tenderness elicited to palpation  Extremities:  no edema LE's now                     no calf tenderness noted  Skin:  warm, dry, no rash noted      I&O's Summary :    23 Apr 2021 07:01  -  24 Apr 2021 07:00  --------------------------------------------------------  IN: 815 mL / OUT: 0 mL / NET: 815 mL    24 Apr 2021 07:01  -  24 Apr 2021 17:38  --------------------------------------------------------  IN: 960 mL / OUT: 0 mL / NET: 960 mL      LABS:  CBC Full  -  ( 24 Apr 2021 08:11 )  WBC Count : 26.04 K/uL  RBC Count : 4.66 M/uL  Hemoglobin : 13.8 g/dL  Hematocrit : 41.3 %  Platelet Count - Automated : 303 K/uL  Mean Cell Volume : 88.6 fl  Mean Cell Hemoglobin : 29.6 pg  Mean Cell Hemoglobin Concentration : 33.4 gm/dL  Auto Neutrophil # : 21.35 K/uL  Auto Lymphocyte # : 3.91 K/uL  Auto Monocyte # : 0.52 K/uL  Auto Eosinophil # : 0.00 K/uL  Auto Basophil # : 0.00 K/uL  Auto Neutrophil % : 82.0 %  Auto Lymphocyte % : 15.0 %  Auto Monocyte % : 2.0 %  Auto Eosinophil % : 0.0 %  Auto Basophil % : 0.0 %    04-24    141  |  106  |  17  ----------------------------<  58<L>  3.2<L>   |  29  |  0.62    Ca    8.3<L>      24 Apr 2021 08:11    TPro  6.6  /  Alb  2.4<L>  /  TBili  0.6  /  DBili  .18  /  AST  25  /  ALT  20  /  AlkPhos  105  04-24    LIVER FUNCTIONS - ( 24 Apr 2021 08:11 )  Alb: 2.4 g/dL / Pro: 6.6 gm/dL / ALK PHOS: 105 U/L / ALT: 20 U/L / AST: 25 U/L / GGT: x           Sedimentation Rate, Erythrocyte: 48 mm/hr (04-24 @ 09:39)  Sedimentation Rate, Erythrocyte: 52 mm/hr (04-22 @ 08:04)  Sedimentation Rate, Erythrocyte: 25 mm/hr (04-20 @ 07:24)  Sedimentation Rate, Erythrocyte: 97 mm/hr (04-18 @ 07:08)    C-Reactive Protein, Serum (04.23.21 @ 10:04)    C-Reactive Protein, Serum: 78: Please note: Reference range has changed due to units of measure change. mg/L  C-Reactive Protein, Serum (04.22.21 @ 10:51)    C-Reactive Protein, Serum: 46: Please note: Reference range has changed due to units of measure change. mg/L  C-Reactive Protein, Serum (04.18.21 @ 10:47)    C-Reactive Protein, Serum: 226: Please note: Reference range has changed due to units of measure change. mg/L    Procalcitonin, Serum (04.22.21 @ 10:51)    Procalcitonin, Serum: 0.06:   Procalcitonin, Serum (04.18.21 @ 10:59)    Procalcitonin, Serum: 0.10:   Procalcitonin, Serum (04.18.21 @ 10:47)    Procalcitonin, Serum: 0.11  Procalcitonin, Serum (04.18.21 @ 01:25)    Procalcitonin, Serum: 0.08:    A1C with Estimated Average Glucose (04.19.21 @ 10:40)    A1C with Estimated Average Glucose Result: 13.5: Method: Immunoassay       Reference Range                4.0-5.6%       High risk (prediabetic)        5.7-6.4%       Diabetic, diagnostic             >=6.5%       ADA diabetic treatment goal       <7.0%    Estimated Average Glucose: 341    Ferritin, Serum in AM (04.24.21 @ 13:08)    Ferritin, Serum: 628 ng/mL  Ferritin, Serum in AM (04.23.21 @ 10:02)    Ferritin, Serum: 512 ng/mL  Ferritin, Serum in AM (04.22.21 @ 10:48)    Ferritin, Serum: 585 ng/mL  Ferritin, Serum in AM (04.20.21 @ 10:58)    Ferritin, Serum: 794 ng/mL  Ferritin, Serum (04.18.21 @ 01:26)    Ferritin, Serum: 612 ng/mL    Lactate, Blood (04.17.21 @ 21:55)    Lactate, Blood: 1.2 mmol/L  Lactate, Blood (04.17.21 @ 17:42)    Lactate, Blood: 1.3 mmol/L      Lactate Dehydrogenase, Serum in AM (04.23.21 @ 10:04)    Lactate Dehydrogenase, Serum: 479 U/L  Lactate Dehydrogenase, Serum (04.18.21 @ 10:59)    Lactate Dehydrogenase, Serum: 415 U/L  Lactate Dehydrogenase, Serum (04.18.21 @ 01:25)    Lactate Dehydrogenase, Serum: 479: Mild hemolysis. Results may be falsely elevated. U/L    D-Dimer Assay, Quantitative (04.24.21 @ 08:11)    D-Dimer Assay, Quantitative: 58147 ng/mL DDU    D-Dimer Assay, Quantitative (04.23.21 @ 08:16)    D-Dimer Assay, Quantitative: 7816 ng/mL DDU    D-Dimer Assay, Quantitative (04.22.21 @ 08:04)    D-Dimer Assay, Quantitative: 6019 ng/mL DDU    D-Dimer Assay, Quantitative (04.19.21 @ 20:49)    D-Dimer Assay, Quantitative: 267 ng/mL DDU    D-Dimer Assay, Quantitative (04.18.21 @ 07:08)    D-Dimer Assay, Quantitative: 248 ng/mL DDU    D-Dimer Assay, Quantitative (04.17.21 @ 17:42)    D-Dimer Assay, Quantitative: 222 ng/mL DDU      MICROBIOLOGY:  COVID-19 Erwin Domain Antibody (04.18.21 @ 10:45)    COVID-19 Erwin Domain Antibody Result: 0.40: Roche ECLIA Total AB (AKILAH)  NOTE: This result index represents a total antibody measurement, which  includes IgG, IgA and IgM.  Measures Receptor Binding Domain of the Erwin Protein  Negative <= 0.79 U/mL  Positive  >= 0.80 U/mL U/mL    COVID-19 Erwin Domain AB Interp: Negative    Flu With COVID-19 By KELLI (04.17.21 @ 18:31)    SARS-CoV-2 Result: Detected: EUA/IVD    Influenza A Result: NotDetec: EUA/IVD    Influenza B Result: NotDetec: EUA/IVD      RADIOLOGY:         < from: CT Angio Chest w/ IV Cont (04.19.21 @ 19:01) >  EXAM:  CT ANGIO CHEST (W)AW IC                        PROCEDURE DATE:  04/19/2021    INTERPRETATION:  CLINICAL INFORMATION: Shortness of breath. COVID positive.  COMPARISON: Chest x-ray of 4/17/2021.  CONTRAST/COMPLICATIONS:  IV Contrast: Omnipaque 350  60 cc administered   40 cc discarded  Oral Contrast: NONE  Complications: None reported at time of study completion  PROCEDURE:  CT Angiography of the Chest.  Sagittal and coronal reformats were performed as well as 3D (MIP) reconstructions.  FINDINGS:  LUNGS AND AIRWAYS: Patent central airways.  The lungs demonstrate bilateral multi focal groundglass opacities most prominent in the midlungs suggestive of atypical pneumonia such as that from COVID 19. Punctate calcified left lower lobe granuloma.  PLEURA: No pleural effusion.  MEDIASTINUM AND JUNIOR: No lymphadenopathy. Punctate left hilar calcification  VESSELS: There is atherosclerotic calcification of the aorta  HEART: Heart size is normal. No pericardial effusion.  CHEST WALL AND LOWER NECK: Within normal limits.  VISUALIZED UPPER ABDOMEN: Within normal limits.  BONES: Degenerative change of the spine  IMPRESSION: No evidence of pulmonary embolism  Bilateral multifocal infiltrates suggestive of pneumonia associated with COVID 19. Lines hilar calcifications and calcified left lower lobe granuloma consistent with old granulomatous disease                     < from: Xray Chest 1 View- PORTABLE-Urgent (04.17.21 @ 17:26) >  EXAM:  XR CHEST PORTABLE URGENT 1V                        PROCEDURE DATE:  04/17/2021    INTERPRETATION:  Chest one view  HISTORY: Shortness of breath, cough and fever  COMPARISON STUDY: None available  Frontal expiratory view of the chest shows the heart to be slightly enlarged in size. The lungs show bilateral patchy pulmonary infiltrates and there is no evidence of pneumothorax nor pleural effusion.  IMPRESSION:  Patchy infiltrates. Differential diagnosis includes Covid-19 pneumonia among other possibilities.        Impression:   69 y/o female with hx of DM reportedly diet- controlled but otherwise in good health, admitted via the ER on 4/17/21 with c/o worsening SOB and cough with associated pleuritic -type cp over the prior few days after testing positive for COVID-19 by PCR a few days PTA and noted by her niece who is an RN to have decreased O2 Sat to 84% at home.  In the ER patient was begun on 3 Liters NC O2 and rx with Remdesivir and Decadron was begun.  Initial CXR revealed bilateral patchy pulmonary infiltrates consistent with dx of COVID-19 Multifocal PNA.   CT Angio of the Chest was done on 4/19/21 and confirmed  Bilateral Multifocal Infiltrates but no evidence of PE, but with evidence of old granulomatous disease involving the LLL. O2 Sat's have remained low and patient now requiring NRB O2.  WBC's are elevated - likely secondary to steroids.  Further w/u with HGB A1C was found to be very elevated consistent with poorly-controlled DM at home.  Now receiving Insulin rx for improved control of her DM.  Noted with increasing D-dimer, Ferritin, ESR, and CRP, along with elevated WBC's over the past few days and still with reported labored breathing.    Suggestions:  Will therefore continue current rx with Remdesivir and Decadron in view of continued/ worsening hypoxia.  Follow-up temps and labs closely.  Maintain Airborne/ Contact Isolation for COVID-19 Multifocal PNA.  OOB to chair as tolerated.  Discussed in detail with patient at the bedside.

## 2021-04-24 NOTE — CONSULT NOTE ADULT - SUBJECTIVE AND OBJECTIVE BOX
Patient is a 70y old  Female who presents with a chief complaint of covid PNA with Hypoxia (24 Apr 2021 11:53)    HPI:   69 yo F with diet controlled DM, who tested positive for COVID 3 days prior to her presentation.  Presented  with complaints of shortness of breath and low oxygen levels noted on pulse oximeter. Patient states her daughter tested positive, unsure which day, and prompted her and her  to get tested. States she has been otherwise asymptomatic until today. Denies cp. No fevers., however has fever her of 101F in the ED.  Non smoker    PAST MEDICAL & SURGICAL HISTORY:  diet controlled DM    SOCIAL HISTORY: non smoker    Allergies  Allergy Status Unknown    MEDICATIONS  (STANDING):  ascorbic acid 500 milliGRAM(s) Oral daily  dexAMETHasone  Injectable 6 milliGRAM(s) IV Push daily  dextrose 40% Gel 15 Gram(s) Oral once  dextrose 5%. 1000 milliLiter(s) (50 mL/Hr) IV Continuous <Continuous>  dextrose 5%. 1000 milliLiter(s) (100 mL/Hr) IV Continuous <Continuous>  dextrose 50% Injectable 25 Gram(s) IV Push once  dextrose 50% Injectable 12.5 Gram(s) IV Push once  dextrose 50% Injectable 25 Gram(s) IV Push once  enoxaparin Injectable 40 milliGRAM(s) SubCutaneous daily  glucagon  Injectable 1 milliGRAM(s) IntraMuscular once  insulin glargine Injectable (LANTUS) 20 Unit(s) SubCutaneous at bedtime  insulin lispro (ADMELOG) corrective regimen sliding scale   SubCutaneous three times a day before meals  insulin lispro (ADMELOG) corrective regimen sliding scale   SubCutaneous at bedtime  insulin lispro Injectable (ADMELOG) 4 Unit(s) SubCutaneous three times a day before meals  remdesivir  IVPB 100 milliGRAM(s) IV Intermittent every 24 hours  senna 2 Tablet(s) Oral at bedtime  zinc sulfate 220 milliGRAM(s) Oral daily    MEDICATIONS  (PRN):  acetaminophen   Tablet .. 650 milliGRAM(s) Oral every 4 hours PRN Temp greater or equal to 38.5C (101.3F)  magnesium hydroxide Suspension 30 milliLiter(s) Oral daily PRN Constipation    Vital Signs Last 24 Hrs  T(C): 36.5 (24 Apr 2021 16:22), Max: 36.7 (24 Apr 2021 10:35)  T(F): 97.7 (24 Apr 2021 16:22), Max: 98 (24 Apr 2021 10:35)  HR: 78 (24 Apr 2021 16:22) (59 - 86)  BP: 133/78 (24 Apr 2021 16:22) (119/60 - 135/76)  BP(mean): --  RR: 18 (24 Apr 2021 16:22) (18 - 18)  SpO2: 94% (24 Apr 2021 16:22) (90% - 99%)    PHYSICAL EXAM:  GEN:         Awake, responsive and comfortable.  HEENT:    Normal.    RESP:        no distress  CVS:             Regular rate and rhythm.   ABD:         Soft, non-tender, non-distended;   SKIN:           Warm and dry.  EXTR:            No clubbing, cyanosis or edema  CNS:              Intact sensory and motor function.  PSYCH:        cooperative, no anxiety or depression    LABS:                        13.8   26.04 )-----------( 303      ( 24 Apr 2021 08:11 )             41.3     04-24    141  |  106  |  17  ----------------------------<  58<L>  3.2<L>   |  29  |  0.62    Ca    8.3<L>      24 Apr 2021 08:11    TPro  6.6  /  Alb  2.4<L>  /  TBili  0.6  /  DBili  .18  /  AST  25  /  ALT  20  /  AlkPhos  105  04-24    EKG: sinus    RADIOLOGY & ADDITIONAL STUDIES:  ra< from: CT Angio Chest w/ IV Cont (04.19.21 @ 19:01) >  EXAM:  CT ANGIO CHEST (W)AW IC                          PROCEDURE DATE:  04/19/2021      INTERPRETATION:  CLINICAL INFORMATION: Shortness of breath. COVID positive.    COMPARISON: Chest x-ray of 4/17/2021.    CONTRAST/COMPLICATIONS:  IV Contrast: Omnipaque 350  60 cc administered   40 cc discarded  Oral Contrast: NONE  Complications: None reported at time of study completion    PROCEDURE:  CT Angiography of the Chest.  Sagittal and coronal reformats were performed as well as 3D (MIP) reconstructions.    FINDINGS:    LUNGS AND AIRWAYS: Patent central airways.  The lungs demonstrate bilateral multi focal groundglass opacities most prominent in the midlungs suggestive of atypical pneumonia such as that from COVID 19. Punctate calcified left lower lobe granuloma.  PLEURA: No pleural effusion.  MEDIASTINUM AND JUNIOR: No lymphadenopathy. Punctate left hilar calcification  VESSELS: There is atherosclerotic calcification of the aorta  HEART: Heart size is normal. No pericardial effusion.  CHEST WALL AND LOWER NECK: Within normal limits.  VISUALIZED UPPER ABDOMEN: Within normal limits.  BONES: Degenerative change of the spine    IMPRESSION: No evidence of pulmonary embolism  Bilateral multifocal infiltrates suggestive of pneumonia associated with COVID 19. Lines hilar calcifications and calcified left lower lobe granuloma consistent with old granulomatous disease    SHAYNA DO MD; Attending Radiologist  This document has been electronically signed. Apr 19 2021  7:37PM    ASSESSMENT AND PLAN:  ·	Acute hypoxic Respiratory failure.  ·	COVID pneumonia.  ·	Diet controlled DM.  ·	Leukocytosis, on Dexamethasone.  ·	Hypokalemia.    SPO2 95% on NRB mask.  Continue Remdesivir and Dexamethasone.  Will change Lovenox to full dose as D Dimer close to [id number].

## 2021-04-24 NOTE — PROGRESS NOTE ADULT - SUBJECTIVE AND OBJECTIVE BOX
Patient is a 70y old  Female who presents with a chief complaint of covid PNA with Hypoxia (23 Apr 2021 16:37)  blood suagrs elevated from  dex   patieent on Roblox study   still on Remsesivir   still needs supplemental o2  vitals stable     d- dimer  trending up.        INTERVAL HPI/OVERNIGHT EVENTS:  PAST MEDICAL & SURGICAL HISTORY:      MEDICATIONS  (STANDING):  ascorbic acid 500 milliGRAM(s) Oral daily  dexAMETHasone  Injectable 6 milliGRAM(s) IV Push daily  dextrose 40% Gel 15 Gram(s) Oral once  dextrose 5%. 1000 milliLiter(s) (50 mL/Hr) IV Continuous <Continuous>  dextrose 5%. 1000 milliLiter(s) (100 mL/Hr) IV Continuous <Continuous>  dextrose 50% Injectable 25 Gram(s) IV Push once  dextrose 50% Injectable 12.5 Gram(s) IV Push once  dextrose 50% Injectable 25 Gram(s) IV Push once  enoxaparin Injectable 40 milliGRAM(s) SubCutaneous daily  glucagon  Injectable 1 milliGRAM(s) IntraMuscular once  insulin glargine Injectable (LANTUS) 20 Unit(s) SubCutaneous at bedtime  insulin lispro (ADMELOG) corrective regimen sliding scale   SubCutaneous three times a day before meals  insulin lispro (ADMELOG) corrective regimen sliding scale   SubCutaneous at bedtime  insulin lispro Injectable (ADMELOG) 4 Unit(s) SubCutaneous three times a day before meals  potassium chloride    Tablet ER 40 milliEquivalent(s) Oral once  remdesivir  IVPB 100 milliGRAM(s) IV Intermittent every 24 hours  senna 2 Tablet(s) Oral at bedtime  zinc sulfate 220 milliGRAM(s) Oral daily    MEDICATIONS  (PRN):  acetaminophen   Tablet .. 650 milliGRAM(s) Oral every 4 hours PRN Temp greater or equal to 38.5C (101.3F)  magnesium hydroxide Suspension 30 milliLiter(s) Oral daily PRN Constipation      Allergies    Allergy Status Unknown    Intolerances        REVIEW OF SYSTEMS:  CONSTITUTIONAL: No fever, weight loss, or fatigue  EYES: No eye pain, visual disturbances, or discharge  ENMT:  No difficulty hearing, tinnitus, vertigo; No sinus or throat pain  NECK: No pain or stiffness  BREASTS: No pain, masses, or nipple discharge  RESPIRATORY: No cough, wheezing, chills or hemoptysis; No shortness of breath  CARDIOVASCULAR: No chest pain, palpitations, dizziness, or leg swelling  GASTROINTESTINAL: No abdominal or epigastric pain. No nausea, vomiting, or hematemesis; No diarrhea or constipation. No melena or hematochezia.  GENITOURINARY: No dysuria, frequency, hematuria, or incontinence  NEUROLOGICAL: No headaches, memory loss, loss of strength, numbness, or tremors  SKIN: No itching, burning, rashes, or lesions   LYMPH NODES: No enlarged glands  ENDOCRINE: No heat or cold intolerance; No hair loss  MUSCULOSKELETAL: No joint pain or swelling; No muscle, back, or extremity pain  PSYCHIATRIC: No depression, anxiety, mood swings, or difficulty sleeping  HEME/LYMPH: No easy bruising, or bleeding gums  ALLERY AND IMMUNOLOGIC: No hives or eczema    Vital Signs Last 24 Hrs  T(C): 36.7 (24 Apr 2021 10:35), Max: 36.7 (24 Apr 2021 10:35)  T(F): 98 (24 Apr 2021 10:35), Max: 98 (24 Apr 2021 10:35)  HR: 86 (24 Apr 2021 10:35) (59 - 86)  BP: 119/60 (24 Apr 2021 10:35) (119/60 - 135/76)  BP(mean): --  RR: 18 (24 Apr 2021 10:35) (18 - 18)  SpO2: 96% (24 Apr 2021 10:35) (90% - 96%)    PHYSICAL EXAM:  GENERAL: NAD, well-groomed, well-developed  HEAD:  Atraumatic, Normocephalic  EYES: EOMI, PERRLA, conjunctiva and sclera clear  ENMT: No tonsillar erythema, exudates, or enlargement; Moist mucous membranes, Good dentition, No lesions  NECK: Supple, No JVD, Normal thyroid  NERVOUS SYSTEM:  Alert & Oriented X3, Good concentration; Motor Strength 5/5 B/L upper and lower extremities; DTRs 2+ intact and symmetric  CHEST/LUNG: Clear to percussion bilaterally; No rales, rhonchi, wheezing, or rubs  HEART: Regular rate and rhythm; No murmurs, rubs, or gallops  ABDOMEN: Soft, Nontender, Nondistended; Bowel sounds present  EXTREMITIES:  2+ Peripheral Pulses, No clubbing, cyanosis, or edema  LYMPH: No lymphadenopathy noted  SKIN: No rashes or lesions    LABS:                        13.8   26.04 )-----------( 303      ( 24 Apr 2021 08:11 )             41.3     04-24    141  |  106  |  17  ----------------------------<  58<L>  3.2<L>   |  29  |  0.62    Ca    8.3<L>      24 Apr 2021 08:11    TPro  6.6  /  Alb  2.4<L>  /  TBili  0.6  /  DBili  .18  /  AST  25  /  ALT  20  /  AlkPhos  105  04-24          CAPILLARY BLOOD GLUCOSE      POCT Blood Glucose.: 371 mg/dL (24 Apr 2021 10:52)  POCT Blood Glucose.: 174 mg/dL (24 Apr 2021 08:30)  POCT Blood Glucose.: 63 mg/dL (24 Apr 2021 07:30)  POCT Blood Glucose.: 141 mg/dL (23 Apr 2021 20:44)  POCT Blood Glucose.: 218 mg/dL (23 Apr 2021 16:17)                RADIOLOGY & ADDITIONAL TESTS:    Imaging Personally Reviewed:  [ ] YES  [ ] NO    Consultant(s) Notes Reviewed:  [x ] YES  [ ] NO    Care Discussed with Consultants/Other Providers [x ] YES  [ ] NO    Care discussed with family,         [  ]   yes  [  ]  No    imp:    stable[ ]    unstable[  ]     improving [   ]       unchanged  [ x ]                Plans:  Continue present plans  [x  ] potassium supplementation today,  lantus dosing increased.               New consult [  ]   specialty  .......               order test[  ]    test name.                  Discharge Planning  [  ]

## 2021-04-25 LAB
ALBUMIN SERPL ELPH-MCNC: 2 G/DL — LOW (ref 3.3–5)
ALP SERPL-CCNC: 98 U/L — SIGNIFICANT CHANGE UP (ref 40–120)
ALT FLD-CCNC: 19 U/L — SIGNIFICANT CHANGE UP (ref 12–78)
AST SERPL-CCNC: 22 U/L — SIGNIFICANT CHANGE UP (ref 15–37)
BILIRUB DIRECT SERPL-MCNC: 0.12 MG/DL — SIGNIFICANT CHANGE UP (ref 0.05–0.2)
BILIRUB INDIRECT FLD-MCNC: 0.4 MG/DL — SIGNIFICANT CHANGE UP (ref 0.2–1)
BILIRUB SERPL-MCNC: 0.5 MG/DL — SIGNIFICANT CHANGE UP (ref 0.2–1.2)
CREAT SERPL-MCNC: 0.52 MG/DL — SIGNIFICANT CHANGE UP (ref 0.5–1.3)
GLUCOSE BLDC GLUCOMTR-MCNC: 123 MG/DL — HIGH (ref 70–99)
GLUCOSE BLDC GLUCOMTR-MCNC: 288 MG/DL — HIGH (ref 70–99)
GLUCOSE BLDC GLUCOMTR-MCNC: 289 MG/DL — HIGH (ref 70–99)
GLUCOSE BLDC GLUCOMTR-MCNC: 312 MG/DL — HIGH (ref 70–99)
GLUCOSE BLDC GLUCOMTR-MCNC: 65 MG/DL — LOW (ref 70–99)
GLUCOSE BLDC GLUCOMTR-MCNC: 66 MG/DL — LOW (ref 70–99)
PROT SERPL-MCNC: 5.8 GM/DL — LOW (ref 6–8.3)

## 2021-04-25 RX ORDER — LANOLIN ALCOHOL/MO/W.PET/CERES
3 CREAM (GRAM) TOPICAL ONCE
Refills: 0 | Status: DISCONTINUED | OUTPATIENT
Start: 2021-04-25 | End: 2021-05-03

## 2021-04-25 RX ADMIN — Medication 500 MILLIGRAM(S): at 11:25

## 2021-04-25 RX ADMIN — Medication 4 UNIT(S): at 11:25

## 2021-04-25 RX ADMIN — INSULIN GLARGINE 20 UNIT(S): 100 INJECTION, SOLUTION SUBCUTANEOUS at 22:19

## 2021-04-25 RX ADMIN — ENOXAPARIN SODIUM 60 MILLIGRAM(S): 100 INJECTION SUBCUTANEOUS at 17:03

## 2021-04-25 RX ADMIN — ZINC SULFATE TAB 220 MG (50 MG ZINC EQUIVALENT) 220 MILLIGRAM(S): 220 (50 ZN) TAB at 11:25

## 2021-04-25 RX ADMIN — Medication 6 MILLIGRAM(S): at 05:05

## 2021-04-25 RX ADMIN — Medication 6: at 16:28

## 2021-04-25 RX ADMIN — Medication 4: at 22:18

## 2021-04-25 RX ADMIN — REMDESIVIR 500 MILLIGRAM(S): 5 INJECTION INTRAVENOUS at 22:16

## 2021-04-25 RX ADMIN — SENNA PLUS 2 TABLET(S): 8.6 TABLET ORAL at 22:16

## 2021-04-25 RX ADMIN — ENOXAPARIN SODIUM 60 MILLIGRAM(S): 100 INJECTION SUBCUTANEOUS at 05:04

## 2021-04-25 RX ADMIN — Medication 4 UNIT(S): at 16:29

## 2021-04-25 RX ADMIN — Medication 6: at 11:25

## 2021-04-25 NOTE — PROGRESS NOTE ADULT - SUBJECTIVE AND OBJECTIVE BOX
INTERVAL HPI:   71 yo F with diet controlled DM, who tested positive for COVID 3 days prior to her presentation.  Presented  with complaints of shortness of breath and low oxygen levels noted on pulse oximeter. Patient states her daughter tested positive, unsure which day, and prompted her and her  to get tested. States she has been otherwise asymptomatic until today. Denies cp. No fevers., however has fever her of 101F in the ED.  Non smoker    OVERNIGHT EVENTS:  Resting on NRB mask.    Vital Signs Last 24 Hrs  T(C): 36.8 (25 Apr 2021 16:12), Max: 36.8 (25 Apr 2021 16:12)  T(F): 98.2 (25 Apr 2021 16:12), Max: 98.2 (25 Apr 2021 16:12)  HR: 77 (25 Apr 2021 16:12) (65 - 78)  BP: 127/74 (25 Apr 2021 16:12) (109/65 - 133/74)  BP(mean): --  RR: 20 (25 Apr 2021 16:12) (18 - 20)  SpO2: 94% (25 Apr 2021 16:12) (85% - 98%)    PHYSICAL EXAM:  GEN:        Resting, comfortable.  HEENT:    NRB mask  RESP:       no distress  CVS:             Regular rate and rhythm.   ABD:         Soft, non-tender, non-distended;     MEDICATIONS  (STANDING):  ascorbic acid 500 milliGRAM(s) Oral daily  dexAMETHasone  Injectable 6 milliGRAM(s) IV Push daily  dextrose 40% Gel 15 Gram(s) Oral once  dextrose 5%. 1000 milliLiter(s) (50 mL/Hr) IV Continuous <Continuous>  dextrose 5%. 1000 milliLiter(s) (100 mL/Hr) IV Continuous <Continuous>  dextrose 50% Injectable 25 Gram(s) IV Push once  dextrose 50% Injectable 12.5 Gram(s) IV Push once  dextrose 50% Injectable 25 Gram(s) IV Push once  enoxaparin Injectable 60 milliGRAM(s) SubCutaneous every 12 hours  glucagon  Injectable 1 milliGRAM(s) IntraMuscular once  insulin glargine Injectable (LANTUS) 20 Unit(s) SubCutaneous at bedtime  insulin lispro (ADMELOG) corrective regimen sliding scale   SubCutaneous three times a day before meals  insulin lispro (ADMELOG) corrective regimen sliding scale   SubCutaneous at bedtime  insulin lispro Injectable (ADMELOG) 4 Unit(s) SubCutaneous three times a day before meals  remdesivir  IVPB 100 milliGRAM(s) IV Intermittent every 24 hours  senna 2 Tablet(s) Oral at bedtime  zinc sulfate 220 milliGRAM(s) Oral daily    MEDICATIONS  (PRN):  acetaminophen   Tablet .. 650 milliGRAM(s) Oral every 4 hours PRN Temp greater or equal to 38.5C (101.3F)  magnesium hydroxide Suspension 30 milliLiter(s) Oral daily PRN Constipation    LABS:                        13.8   26.04 )-----------( 303      ( 24 Apr 2021 08:11 )             41.3     04-25    x   |  x   |  x   ----------------------------<  x   x    |  x   |  0.52    Ca    8.3<L>      24 Apr 2021 08:11    TPro  5.8<L>  /  Alb  2.0<L>  /  TBili  0.5  /  DBili  .12  /  AST  22  /  ALT  19  /  AlkPhos  98  04-25    ASSESSMENT AND PLAN:  ·	Acute hypoxic Respiratory failure.  ·	COVID pneumonia.  ·	Diet controlled DM.  ·	Leukocytosis, on Dexamethasone.  ·	Hypokalemia.    SPO2 95% on NRB mask, did not do well off it.  Continue Remdesivir and Dexamethasone.  On  full dose Lovenox as D Dimer close to [id number].

## 2021-04-25 NOTE — PROGRESS NOTE ADULT - SUBJECTIVE AND OBJECTIVE BOX
Patient is a 70y old  Female who presents with a chief complaint of covid PNA with Hypoxia (24 Apr 2021 17:37)    no distress on supplementary o2.   ferritin elevated.  blood suagr controlled   liver enzymes normal.   crp elevated  INTERVAL HPI/OVERNIGHT EVENTS:  PAST MEDICAL & SURGICAL HISTORY:      MEDICATIONS  (STANDING):  ascorbic acid 500 milliGRAM(s) Oral daily  dexAMETHasone  Injectable 6 milliGRAM(s) IV Push daily  dextrose 40% Gel 15 Gram(s) Oral once  dextrose 5%. 1000 milliLiter(s) (50 mL/Hr) IV Continuous <Continuous>  dextrose 5%. 1000 milliLiter(s) (100 mL/Hr) IV Continuous <Continuous>  dextrose 50% Injectable 25 Gram(s) IV Push once  dextrose 50% Injectable 12.5 Gram(s) IV Push once  dextrose 50% Injectable 25 Gram(s) IV Push once  enoxaparin Injectable 60 milliGRAM(s) SubCutaneous every 12 hours  glucagon  Injectable 1 milliGRAM(s) IntraMuscular once  insulin glargine Injectable (LANTUS) 20 Unit(s) SubCutaneous at bedtime  insulin lispro (ADMELOG) corrective regimen sliding scale   SubCutaneous three times a day before meals  insulin lispro (ADMELOG) corrective regimen sliding scale   SubCutaneous at bedtime  insulin lispro Injectable (ADMELOG) 4 Unit(s) SubCutaneous three times a day before meals  remdesivir  IVPB 100 milliGRAM(s) IV Intermittent every 24 hours  senna 2 Tablet(s) Oral at bedtime  zinc sulfate 220 milliGRAM(s) Oral daily    MEDICATIONS  (PRN):  acetaminophen   Tablet .. 650 milliGRAM(s) Oral every 4 hours PRN Temp greater or equal to 38.5C (101.3F)  magnesium hydroxide Suspension 30 milliLiter(s) Oral daily PRN Constipation      Allergies    Allergy Status Unknown    Intolerances        REVIEW OF SYSTEMS:  CONSTITUTIONAL: No fever, weight loss, or fatigue  EYES: No eye pain, visual disturbances, or discharge  ENMT:  No difficulty hearing, tinnitus, vertigo; No sinus or throat pain  NECK: No pain or stiffness  BREASTS: No pain, masses, or nipple discharge  RESPIRATORY: No cough, wheezing, chills or hemoptysis; No shortness of breath  CARDIOVASCULAR: No chest pain, palpitations, dizziness, or leg swelling  GASTROINTESTINAL: No abdominal or epigastric pain. No nausea, vomiting, or hematemesis; No diarrhea or constipation. No melena or hematochezia.  GENITOURINARY: No dysuria, frequency, hematuria, or incontinence  NEUROLOGICAL: No headaches, memory loss, loss of strength, numbness, or tremors  SKIN: No itching, burning, rashes, or lesions   LYMPH NODES: No enlarged glands  ENDOCRINE: No heat or cold intolerance; No hair loss  MUSCULOSKELETAL: No joint pain or swelling; No muscle, back, or extremity pain  PSYCHIATRIC: No depression, anxiety, mood swings, or difficulty sleeping  HEME/LYMPH: No easy bruising, or bleeding gums  ALLERY AND IMMUNOLOGIC: No hives or eczema    Vital Signs Last 24 Hrs  T(C): 36.6 (25 Apr 2021 10:38), Max: 36.6 (25 Apr 2021 10:38)  T(F): 97.8 (25 Apr 2021 10:38), Max: 97.8 (25 Apr 2021 10:38)  HR: 78 (25 Apr 2021 10:38) (65 - 78)  BP: 109/65 (25 Apr 2021 10:38) (109/65 - 133/78)  BP(mean): --  RR: 18 (25 Apr 2021 10:38) (18 - 18)  SpO2: 92% (25 Apr 2021 10:38) (90% - 99%)    PHYSICAL EXAM:  GENERAL: NAD, well-groomed, well-developed  HEAD:  Atraumatic, Normocephalic  EYES: EOMI, PERRLA, conjunctiva and sclera clear  ENMT: No tonsillar erythema, exudates, or enlargement; Moist mucous membranes, Good dentition, No lesions  NECK: Supple, No JVD, Normal thyroid  NERVOUS SYSTEM:  Alert & Oriented X3, Good concentration; Motor Strength 5/5 B/L upper and lower extremities; DTRs 2+ intact and symmetric  CHEST/LUNG: Clear to percussion bilaterally; No rales, rhonchi, wheezing, or rubs  HEART: Regular rate and rhythm; No murmurs, rubs, or gallops  ABDOMEN: Soft, Nontender, Nondistended; Bowel sounds present  EXTREMITIES:  2+ Peripheral Pulses, No clubbing, cyanosis, or edema  LYMPH: No lymphadenopathy noted  SKIN: No rashes or lesions    LABS:                        13.8   26.04 )-----------( 303      ( 24 Apr 2021 08:11 )             41.3     04-25    x   |  x   |  x   ----------------------------<  x   x    |  x   |  0.52    Ca    8.3<L>      24 Apr 2021 08:11    TPro  5.8<L>  /  Alb  2.0<L>  /  TBili  0.5  /  DBili  .12  /  AST  22  /  ALT  19  /  AlkPhos  98  04-25          CAPILLARY BLOOD GLUCOSE      POCT Blood Glucose.: 123 mg/dL (25 Apr 2021 07:52)  POCT Blood Glucose.: 65 mg/dL (25 Apr 2021 07:32)  POCT Blood Glucose.: 66 mg/dL (25 Apr 2021 07:28)  POCT Blood Glucose.: 114 mg/dL (24 Apr 2021 22:31)  POCT Blood Glucose.: 158 mg/dL (24 Apr 2021 21:07)  POCT Blood Glucose.: 147 mg/dL (24 Apr 2021 16:09)  POCT Blood Glucose.: 371 mg/dL (24 Apr 2021 10:52)                RADIOLOGY & ADDITIONAL TESTS:    Imaging Personally Reviewed:  [ ] YES  [ ] NO    Consultant(s) Notes Reviewed:  [ ] YES  [ ] NO    Care Discussed with Consultants/Other Providers [ ] YES  [ ] NO    Care discussed with family,         [ c ]   yes  [  ]  No    imp:    stable[ ]    unstable[  ]     improving [   ]       unchanged  [c  ]                Plans:  Continue present plans  [c  ] will try to titrate dwn o2, discussed with  RN. still on remdesivir and  dex               New consult [  ]   specialty  .......               order test[  ]    test name.                  Discharge Planning  [  ]

## 2021-04-26 LAB
ALBUMIN SERPL ELPH-MCNC: 2 G/DL — LOW (ref 3.3–5)
ALP SERPL-CCNC: 90 U/L — SIGNIFICANT CHANGE UP (ref 40–120)
ALT FLD-CCNC: 19 U/L — SIGNIFICANT CHANGE UP (ref 12–78)
ANION GAP SERPL CALC-SCNC: 3 MMOL/L — LOW (ref 5–17)
AST SERPL-CCNC: 25 U/L — SIGNIFICANT CHANGE UP (ref 15–37)
BASOPHILS # BLD AUTO: 0.03 K/UL — SIGNIFICANT CHANGE UP (ref 0–0.2)
BASOPHILS NFR BLD AUTO: 0.1 % — SIGNIFICANT CHANGE UP (ref 0–2)
BILIRUB DIRECT SERPL-MCNC: 0.13 MG/DL — SIGNIFICANT CHANGE UP (ref 0.05–0.2)
BILIRUB INDIRECT FLD-MCNC: 0.2 MG/DL — SIGNIFICANT CHANGE UP (ref 0.2–1)
BILIRUB SERPL-MCNC: 0.3 MG/DL — SIGNIFICANT CHANGE UP (ref 0.2–1.2)
BUN SERPL-MCNC: 17 MG/DL — SIGNIFICANT CHANGE UP (ref 7–23)
CALCIUM SERPL-MCNC: 7.9 MG/DL — LOW (ref 8.5–10.1)
CHLORIDE SERPL-SCNC: 104 MMOL/L — SIGNIFICANT CHANGE UP (ref 96–108)
CO2 SERPL-SCNC: 32 MMOL/L — HIGH (ref 22–31)
CREAT SERPL-MCNC: 0.54 MG/DL — SIGNIFICANT CHANGE UP (ref 0.5–1.3)
CRP SERPL-MCNC: 68 MG/L — HIGH
D DIMER BLD IA.RAPID-MCNC: 3678 NG/ML DDU — HIGH
EOSINOPHIL # BLD AUTO: 0.35 K/UL — SIGNIFICANT CHANGE UP (ref 0–0.5)
EOSINOPHIL NFR BLD AUTO: 1.5 % — SIGNIFICANT CHANGE UP (ref 0–6)
ERYTHROCYTE [SEDIMENTATION RATE] IN BLOOD: 40 MM/HR — HIGH (ref 0–20)
FERRITIN SERPL-MCNC: 539 NG/ML — HIGH (ref 15–150)
GLUCOSE BLDC GLUCOMTR-MCNC: 106 MG/DL — HIGH (ref 70–99)
GLUCOSE BLDC GLUCOMTR-MCNC: 113 MG/DL — HIGH (ref 70–99)
GLUCOSE BLDC GLUCOMTR-MCNC: 148 MG/DL — HIGH (ref 70–99)
GLUCOSE BLDC GLUCOMTR-MCNC: 235 MG/DL — HIGH (ref 70–99)
GLUCOSE BLDC GLUCOMTR-MCNC: 315 MG/DL — HIGH (ref 70–99)
GLUCOSE BLDC GLUCOMTR-MCNC: 65 MG/DL — LOW (ref 70–99)
GLUCOSE SERPL-MCNC: 59 MG/DL — LOW (ref 70–99)
HCT VFR BLD CALC: 34.6 % — SIGNIFICANT CHANGE UP (ref 34.5–45)
HGB BLD-MCNC: 11.5 G/DL — SIGNIFICANT CHANGE UP (ref 11.5–15.5)
IMM GRANULOCYTES NFR BLD AUTO: 1 % — SIGNIFICANT CHANGE UP (ref 0–1.5)
LYMPHOCYTES # BLD AUTO: 0.82 K/UL — LOW (ref 1–3.3)
LYMPHOCYTES # BLD AUTO: 3.6 % — LOW (ref 13–44)
MCHC RBC-ENTMCNC: 30 PG — SIGNIFICANT CHANGE UP (ref 27–34)
MCHC RBC-ENTMCNC: 33.2 GM/DL — SIGNIFICANT CHANGE UP (ref 32–36)
MCV RBC AUTO: 90.3 FL — SIGNIFICANT CHANGE UP (ref 80–100)
MONOCYTES # BLD AUTO: 0.81 K/UL — SIGNIFICANT CHANGE UP (ref 0–0.9)
MONOCYTES NFR BLD AUTO: 3.6 % — SIGNIFICANT CHANGE UP (ref 2–14)
NEUTROPHILS # BLD AUTO: 20.45 K/UL — HIGH (ref 1.8–7.4)
NEUTROPHILS NFR BLD AUTO: 90.2 % — HIGH (ref 43–77)
NRBC # BLD: 0 /100 WBCS — SIGNIFICANT CHANGE UP (ref 0–0)
PLATELET # BLD AUTO: 329 K/UL — SIGNIFICANT CHANGE UP (ref 150–400)
POTASSIUM SERPL-MCNC: 3.7 MMOL/L — SIGNIFICANT CHANGE UP (ref 3.5–5.3)
POTASSIUM SERPL-SCNC: 3.7 MMOL/L — SIGNIFICANT CHANGE UP (ref 3.5–5.3)
PROT SERPL-MCNC: 5.7 GM/DL — LOW (ref 6–8.3)
RBC # BLD: 3.83 M/UL — SIGNIFICANT CHANGE UP (ref 3.8–5.2)
RBC # FLD: 12.5 % — SIGNIFICANT CHANGE UP (ref 10.3–14.5)
SODIUM SERPL-SCNC: 139 MMOL/L — SIGNIFICANT CHANGE UP (ref 135–145)
WBC # BLD: 22.68 K/UL — HIGH (ref 3.8–10.5)
WBC # FLD AUTO: 22.68 K/UL — HIGH (ref 3.8–10.5)

## 2021-04-26 PROCEDURE — 71045 X-RAY EXAM CHEST 1 VIEW: CPT | Mod: 26

## 2021-04-26 RX ORDER — METFORMIN HYDROCHLORIDE 850 MG/1
1000 TABLET ORAL EVERY 12 HOURS
Refills: 0 | Status: DISCONTINUED | OUTPATIENT
Start: 2021-04-26 | End: 2021-05-03

## 2021-04-26 RX ORDER — INSULIN LISPRO 100/ML
8 VIAL (ML) SUBCUTANEOUS
Refills: 0 | Status: DISCONTINUED | OUTPATIENT
Start: 2021-04-26 | End: 2021-04-28

## 2021-04-26 RX ORDER — INSULIN GLARGINE 100 [IU]/ML
12 INJECTION, SOLUTION SUBCUTANEOUS AT BEDTIME
Refills: 0 | Status: DISCONTINUED | OUTPATIENT
Start: 2021-04-26 | End: 2021-04-27

## 2021-04-26 RX ADMIN — ENOXAPARIN SODIUM 60 MILLIGRAM(S): 100 INJECTION SUBCUTANEOUS at 05:37

## 2021-04-26 RX ADMIN — Medication 8 UNIT(S): at 16:30

## 2021-04-26 RX ADMIN — Medication 8: at 16:29

## 2021-04-26 RX ADMIN — ENOXAPARIN SODIUM 60 MILLIGRAM(S): 100 INJECTION SUBCUTANEOUS at 18:12

## 2021-04-26 RX ADMIN — Medication 500 MILLIGRAM(S): at 11:25

## 2021-04-26 RX ADMIN — REMDESIVIR 500 MILLIGRAM(S): 5 INJECTION INTRAVENOUS at 22:00

## 2021-04-26 RX ADMIN — ZINC SULFATE TAB 220 MG (50 MG ZINC EQUIVALENT) 220 MILLIGRAM(S): 220 (50 ZN) TAB at 11:25

## 2021-04-26 RX ADMIN — Medication 6 MILLIGRAM(S): at 05:36

## 2021-04-26 RX ADMIN — Medication 4: at 11:26

## 2021-04-26 RX ADMIN — Medication 8 UNIT(S): at 11:26

## 2021-04-26 RX ADMIN — INSULIN GLARGINE 12 UNIT(S): 100 INJECTION, SOLUTION SUBCUTANEOUS at 22:00

## 2021-04-26 RX ADMIN — METFORMIN HYDROCHLORIDE 1000 MILLIGRAM(S): 850 TABLET ORAL at 18:12

## 2021-04-26 NOTE — PROGRESS NOTE ADULT - SUBJECTIVE AND OBJECTIVE BOX
Patient is a 70y old  Female who presents with a chief complaint of covid PNA with Hypoxia (25 Apr 2021 18:54)     vitals stable   mild respiratory  distress   still o2 dependent   D-Dimer is trending down   WBC is still elevated   Blood sugars stabilizing- Lantus reduced.,    INTERVAL HPI/OVERNIGHT EVENTS:  PAST MEDICAL & SURGICAL HISTORY:      MEDICATIONS  (STANDING):  ascorbic acid 500 milliGRAM(s) Oral daily  dextrose 40% Gel 15 Gram(s) Oral once  dextrose 5%. 1000 milliLiter(s) (50 mL/Hr) IV Continuous <Continuous>  dextrose 5%. 1000 milliLiter(s) (100 mL/Hr) IV Continuous <Continuous>  dextrose 50% Injectable 25 Gram(s) IV Push once  dextrose 50% Injectable 12.5 Gram(s) IV Push once  dextrose 50% Injectable 25 Gram(s) IV Push once  enoxaparin Injectable 60 milliGRAM(s) SubCutaneous every 12 hours  glucagon  Injectable 1 milliGRAM(s) IntraMuscular once  insulin glargine Injectable (LANTUS) 12 Unit(s) SubCutaneous at bedtime  insulin lispro (ADMELOG) corrective regimen sliding scale   SubCutaneous three times a day before meals  insulin lispro (ADMELOG) corrective regimen sliding scale   SubCutaneous at bedtime  insulin lispro Injectable (ADMELOG) 8 Unit(s) SubCutaneous three times a day before meals  remdesivir  IVPB 100 milliGRAM(s) IV Intermittent every 24 hours  senna 2 Tablet(s) Oral at bedtime  zinc sulfate 220 milliGRAM(s) Oral daily    MEDICATIONS  (PRN):  acetaminophen   Tablet .. 650 milliGRAM(s) Oral every 4 hours PRN Temp greater or equal to 38.5C (101.3F)  magnesium hydroxide Suspension 30 milliLiter(s) Oral daily PRN Constipation  melatonin 3 milliGRAM(s) Oral once PRN Insomnia      Allergies    Allergy Status Unknown    Intolerances        REVIEW OF SYSTEMS:  CONSTITUTIONAL: No fever, weight loss, or fatigue  EYES: No eye pain, visual disturbances, or discharge  ENMT:  No difficulty hearing, tinnitus, vertigo; No sinus or throat pain  NECK: No pain or stiffness  BREASTS: No pain, masses, or nipple discharge  RESPIRATORY: No cough, wheezing, chills or hemoptysis; No shortness of breath  CARDIOVASCULAR: No chest pain, palpitations, dizziness, or leg swelling  GASTROINTESTINAL: No abdominal or epigastric pain. No nausea, vomiting, or hematemesis; No diarrhea or constipation. No melena or hematochezia.  GENITOURINARY: No dysuria, frequency, hematuria, or incontinence  NEUROLOGICAL: No headaches, memory loss, loss of strength, numbness, or tremors  SKIN: No itching, burning, rashes, or lesions   LYMPH NODES: No enlarged glands  ENDOCRINE: No heat or cold intolerance; No hair loss  MUSCULOSKELETAL: No joint pain or swelling; No muscle, back, or extremity pain  PSYCHIATRIC: No depression, anxiety, mood swings, or difficulty sleeping  HEME/LYMPH: No easy bruising, or bleeding gums  ALLERY AND IMMUNOLOGIC: No hives or eczema    Vital Signs Last 24 Hrs  T(C): 36.7 (26 Apr 2021 10:06), Max: 36.8 (25 Apr 2021 16:12)  T(F): 98 (26 Apr 2021 10:06), Max: 98.2 (25 Apr 2021 16:12)  HR: 80 (26 Apr 2021 10:06) (72 - 80)  BP: 104/67 (26 Apr 2021 10:06) (104/67 - 127/74)  BP(mean): --  RR: 18 (26 Apr 2021 10:06) (18 - 20)  SpO2: 97% (26 Apr 2021 10:06) (85% - 98%)    PHYSICAL EXAM:  GENERAL: NAD, well-groomed, well-developed  HEAD:  Atraumatic, Normocephalic  EYES: EOMI, PERRLA, conjunctiva and sclera clear  ENMT: No tonsillar erythema, exudates, or enlargement; Moist mucous membranes, Good dentition, No lesions  NECK: Supple, No JVD, Normal thyroid  NERVOUS SYSTEM:  Alert & Oriented X3, Good concentration; Motor Strength 5/5 B/L upper and lower extremities; DTRs 2+ intact and symmetric  CHEST/LUNG: Clear to percussion bilaterally; No rales, rhonchi, wheezing, or rubs  HEART: Regular rate and rhythm; No murmurs, rubs, or gallops  ABDOMEN: Soft, Nontender, Nondistended; Bowel sounds present  EXTREMITIES:  2+ Peripheral Pulses, No clubbing, cyanosis, or edema  LYMPH: No lymphadenopathy noted  SKIN: No rashes or lesions    LABS:                        11.5   22.68 )-----------( 329      ( 26 Apr 2021 08:24 )             34.6     04-26    139  |  104  |  17  ----------------------------<  59<L>  3.7   |  32<H>  |  0.54    Ca    7.9<L>      26 Apr 2021 08:23    TPro  5.7<L>  /  Alb  2.0<L>  /  TBili  0.3  /  DBili  .13  /  AST  25  /  ALT  19  /  AlkPhos  90  04-26          CAPILLARY BLOOD GLUCOSE      POCT Blood Glucose.: 148 mg/dL (26 Apr 2021 09:28)  POCT Blood Glucose.: 113 mg/dL (26 Apr 2021 08:10)  POCT Blood Glucose.: 65 mg/dL (26 Apr 2021 07:46)  POCT Blood Glucose.: 312 mg/dL (25 Apr 2021 21:55)  POCT Blood Glucose.: 289 mg/dL (25 Apr 2021 15:52)  POCT Blood Glucose.: 288 mg/dL (25 Apr 2021 10:56)                RADIOLOGY & ADDITIONAL TESTS:    Imaging Personally Reviewed:  [ ] YES  [ ] NO    Consultant(s) Notes Reviewed:  [ ] YES  [ ] NO    Care Discussed with Consultants/Other Providers [ ] YES  [ ] NO    Care discussed with family,         [  ]   yes  [  ]  No    imp:    stable[ ]    unstable[  ]     improving [   ]       unchanged  [  ]                Plans:  Continue present plans  [ x ] as per PUL/ ID . try to titrate down o2               New consult [  ]   specialty  .......               order test[  ]    test name.                  Discharge Planning  [  ]

## 2021-04-26 NOTE — PROGRESS NOTE ADULT - SUBJECTIVE AND OBJECTIVE BOX
INTERVAL HPI:  69 yo F with diet controlled DM, who tested positive for COVID 3 days prior to her presentation.  Presented  with complaints of shortness of breath and low oxygen levels noted on pulse oximeter. Patient states her daughter tested positive, unsure which day, and prompted her and her  to get tested. States she has been otherwise asymptomatic until today. Denies cp. No fevers., however has fever her of 101F in the ED.  Non smoker    OVERNIGHT EVENTS:  Now on Venti mask.    Vital Signs Last 24 Hrs  T(C): 36.5 (26 Apr 2021 16:14), Max: 36.7 (26 Apr 2021 05:04)  T(F): 97.7 (26 Apr 2021 16:14), Max: 98 (26 Apr 2021 05:04)  HR: 82 (26 Apr 2021 16:14) (70 - 82)  BP: 119/61 (26 Apr 2021 16:14) (104/67 - 121/70)  BP(mean): --  RR: 18 (26 Apr 2021 18:19) (18 - 20)  SpO2: 92% (26 Apr 2021 18:19) (90% - 97%)    PHYSICAL EXAM:  SPO2 c92% on 50% Venti mask    MEDICATIONS  (STANDING):  ascorbic acid 500 milliGRAM(s) Oral daily  dextrose 40% Gel 15 Gram(s) Oral once  dextrose 5%. 1000 milliLiter(s) (50 mL/Hr) IV Continuous <Continuous>  dextrose 5%. 1000 milliLiter(s) (100 mL/Hr) IV Continuous <Continuous>  dextrose 50% Injectable 25 Gram(s) IV Push once  dextrose 50% Injectable 12.5 Gram(s) IV Push once  dextrose 50% Injectable 25 Gram(s) IV Push once  enoxaparin Injectable 60 milliGRAM(s) SubCutaneous every 12 hours  glucagon  Injectable 1 milliGRAM(s) IntraMuscular once  insulin glargine Injectable (LANTUS) 12 Unit(s) SubCutaneous at bedtime  insulin lispro (ADMELOG) corrective regimen sliding scale   SubCutaneous three times a day before meals  insulin lispro (ADMELOG) corrective regimen sliding scale   SubCutaneous at bedtime  insulin lispro Injectable (ADMELOG) 8 Unit(s) SubCutaneous three times a day before meals  metFORMIN 1000 milliGRAM(s) Oral every 12 hours  remdesivir  IVPB 100 milliGRAM(s) IV Intermittent every 24 hours  senna 2 Tablet(s) Oral at bedtime  zinc sulfate 220 milliGRAM(s) Oral daily    MEDICATIONS  (PRN):  acetaminophen   Tablet .. 650 milliGRAM(s) Oral every 4 hours PRN Temp greater or equal to 38.5C (101.3F)  magnesium hydroxide Suspension 30 milliLiter(s) Oral daily PRN Constipation  melatonin 3 milliGRAM(s) Oral once PRN Insomnia    LABS:                        11.5   22.68 )-----------( 329      ( 26 Apr 2021 08:24 )             34.6     04-26    139  |  104  |  17  ----------------------------<  59<L>  3.7   |  32<H>  |  0.54    Ca    7.9<L>      26 Apr 2021 08:23    TPro  5.7<L>  /  Alb  2.0<L>  /  TBili  0.3  /  DBili  .13  /  AST  25  /  ALT  19  /  AlkPhos  90  04-26    ASSESSMENT AND PLAN:  ·	Acute hypoxic Respiratory failure.  ·	COVID pneumonia.  ·	Diet controlled DM.  ·	Leukocytosis, on Dexamethasone.  ·	Hypokalemia.    SPO2 92% on 50% Venti  mask,   Continue Remdesivir and Dexamethasone.  On  full dose Lovenox as D Dimer close to [id number].

## 2021-04-27 LAB
ALBUMIN SERPL ELPH-MCNC: 1.9 G/DL — LOW (ref 3.3–5)
ALP SERPL-CCNC: 90 U/L — SIGNIFICANT CHANGE UP (ref 40–120)
ALT FLD-CCNC: 21 U/L — SIGNIFICANT CHANGE UP (ref 12–78)
APPEARANCE UR: CLEAR — SIGNIFICANT CHANGE UP
AST SERPL-CCNC: 29 U/L — SIGNIFICANT CHANGE UP (ref 15–37)
BACTERIA # UR AUTO: ABNORMAL
BILIRUB DIRECT SERPL-MCNC: 0.06 MG/DL — SIGNIFICANT CHANGE UP (ref 0.05–0.2)
BILIRUB INDIRECT FLD-MCNC: 0.2 MG/DL — SIGNIFICANT CHANGE UP (ref 0.2–1)
BILIRUB SERPL-MCNC: 0.3 MG/DL — SIGNIFICANT CHANGE UP (ref 0.2–1.2)
BILIRUB UR-MCNC: NEGATIVE — SIGNIFICANT CHANGE UP
COLOR SPEC: YELLOW — SIGNIFICANT CHANGE UP
CREAT SERPL-MCNC: 0.5 MG/DL — SIGNIFICANT CHANGE UP (ref 0.5–1.3)
DIFF PNL FLD: ABNORMAL
EPI CELLS # UR: SIGNIFICANT CHANGE UP
GLUCOSE BLDC GLUCOMTR-MCNC: 147 MG/DL — HIGH (ref 70–99)
GLUCOSE BLDC GLUCOMTR-MCNC: 204 MG/DL — HIGH (ref 70–99)
GLUCOSE BLDC GLUCOMTR-MCNC: 45 MG/DL — CRITICAL LOW (ref 70–99)
GLUCOSE BLDC GLUCOMTR-MCNC: 79 MG/DL — SIGNIFICANT CHANGE UP (ref 70–99)
GLUCOSE BLDC GLUCOMTR-MCNC: 96 MG/DL — SIGNIFICANT CHANGE UP (ref 70–99)
GLUCOSE UR QL: NEGATIVE MG/DL — SIGNIFICANT CHANGE UP
KETONES UR-MCNC: NEGATIVE — SIGNIFICANT CHANGE UP
LEUKOCYTE ESTERASE UR-ACNC: ABNORMAL
NITRITE UR-MCNC: POSITIVE
PH UR: 5 — SIGNIFICANT CHANGE UP (ref 5–8)
PROT SERPL-MCNC: 5.8 GM/DL — LOW (ref 6–8.3)
PROT UR-MCNC: NEGATIVE MG/DL — SIGNIFICANT CHANGE UP
RBC CASTS # UR COMP ASSIST: SIGNIFICANT CHANGE UP /HPF (ref 0–4)
SP GR SPEC: 1.02 — SIGNIFICANT CHANGE UP (ref 1.01–1.02)
UROBILINOGEN FLD QL: NEGATIVE MG/DL — SIGNIFICANT CHANGE UP
WBC UR QL: ABNORMAL

## 2021-04-27 RX ORDER — DEXTROSE 50 % IN WATER 50 %
25 SYRINGE (ML) INTRAVENOUS ONCE
Refills: 0 | Status: COMPLETED | OUTPATIENT
Start: 2021-04-27 | End: 2021-04-27

## 2021-04-27 RX ORDER — INSULIN GLARGINE 100 [IU]/ML
6 INJECTION, SOLUTION SUBCUTANEOUS AT BEDTIME
Refills: 0 | Status: DISCONTINUED | OUTPATIENT
Start: 2021-04-27 | End: 2021-04-28

## 2021-04-27 RX ADMIN — ZINC SULFATE TAB 220 MG (50 MG ZINC EQUIVALENT) 220 MILLIGRAM(S): 220 (50 ZN) TAB at 11:44

## 2021-04-27 RX ADMIN — ENOXAPARIN SODIUM 60 MILLIGRAM(S): 100 INJECTION SUBCUTANEOUS at 17:31

## 2021-04-27 RX ADMIN — Medication 8 UNIT(S): at 11:45

## 2021-04-27 RX ADMIN — METFORMIN HYDROCHLORIDE 1000 MILLIGRAM(S): 850 TABLET ORAL at 17:31

## 2021-04-27 RX ADMIN — Medication 25 GRAM(S): at 07:49

## 2021-04-27 RX ADMIN — METFORMIN HYDROCHLORIDE 1000 MILLIGRAM(S): 850 TABLET ORAL at 05:31

## 2021-04-27 RX ADMIN — INSULIN GLARGINE 6 UNIT(S): 100 INJECTION, SOLUTION SUBCUTANEOUS at 22:09

## 2021-04-27 RX ADMIN — Medication 500 MILLIGRAM(S): at 11:44

## 2021-04-27 RX ADMIN — ENOXAPARIN SODIUM 60 MILLIGRAM(S): 100 INJECTION SUBCUTANEOUS at 05:31

## 2021-04-27 NOTE — PROGRESS NOTE ADULT - SUBJECTIVE AND OBJECTIVE BOX
Patient is a 70y old  Female who presents with a chief complaint of covid PNA with Hypoxia (26 Apr 2021 19:44)  still needs supplemental oxygen.  on 10litres, ventimask 50%  no distress   vitals stable   Diet changed to diabetic diet, Metformin added    INTERVAL HPI/OVERNIGHT EVENTS:  PAST MEDICAL & SURGICAL HISTORY:      MEDICATIONS  (STANDING):  ascorbic acid 500 milliGRAM(s) Oral daily  dextrose 40% Gel 15 Gram(s) Oral once  dextrose 5%. 1000 milliLiter(s) (50 mL/Hr) IV Continuous <Continuous>  dextrose 5%. 1000 milliLiter(s) (100 mL/Hr) IV Continuous <Continuous>  dextrose 50% Injectable 25 Gram(s) IV Push once  dextrose 50% Injectable 12.5 Gram(s) IV Push once  dextrose 50% Injectable 25 Gram(s) IV Push once  enoxaparin Injectable 60 milliGRAM(s) SubCutaneous every 12 hours  glucagon  Injectable 1 milliGRAM(s) IntraMuscular once  insulin glargine Injectable (LANTUS) 6 Unit(s) SubCutaneous at bedtime  insulin lispro (ADMELOG) corrective regimen sliding scale   SubCutaneous three times a day before meals  insulin lispro (ADMELOG) corrective regimen sliding scale   SubCutaneous at bedtime  insulin lispro Injectable (ADMELOG) 8 Unit(s) SubCutaneous three times a day before meals  metFORMIN 1000 milliGRAM(s) Oral every 12 hours  senna 2 Tablet(s) Oral at bedtime  zinc sulfate 220 milliGRAM(s) Oral daily    MEDICATIONS  (PRN):  acetaminophen   Tablet .. 650 milliGRAM(s) Oral every 4 hours PRN Temp greater or equal to 38.5C (101.3F)  magnesium hydroxide Suspension 30 milliLiter(s) Oral daily PRN Constipation  melatonin 3 milliGRAM(s) Oral once PRN Insomnia      Allergies    Allergy Status Unknown    Intolerances        REVIEW OF SYSTEMS:  CONSTITUTIONAL: No fever, weight loss, or fatigue  EYES: No eye pain, visual disturbances, or discharge  ENMT:  No difficulty hearing, tinnitus, vertigo; No sinus or throat pain  NECK: No pain or stiffness  BREASTS: No pain, masses, or nipple discharge  RESPIRATORY: No cough, wheezing, chills or hemoptysis; No shortness of breath  CARDIOVASCULAR: No chest pain, palpitations, dizziness, or leg swelling  GASTROINTESTINAL: No abdominal or epigastric pain. No nausea, vomiting, or hematemesis; No diarrhea or constipation. No melena or hematochezia.  GENITOURINARY: No dysuria, frequency, hematuria, or incontinence  NEUROLOGICAL: No headaches, memory loss, loss of strength, numbness, or tremors  SKIN: No itching, burning, rashes, or lesions   LYMPH NODES: No enlarged glands  ENDOCRINE: No heat or cold intolerance; No hair loss  MUSCULOSKELETAL: No joint pain or swelling; No muscle, back, or extremity pain  PSYCHIATRIC: No depression, anxiety, mood swings, or difficulty sleeping  HEME/LYMPH: No easy bruising, or bleeding gums  ALLERY AND IMMUNOLOGIC: No hives or eczema    Vital Signs Last 24 Hrs  T(C): 36.4 (27 Apr 2021 05:44), Max: 36.6 (27 Apr 2021 00:16)  T(F): 97.5 (27 Apr 2021 05:44), Max: 97.9 (27 Apr 2021 00:16)  HR: 76 (27 Apr 2021 05:44) (70 - 82)  BP: 124/61 (27 Apr 2021 05:44) (119/61 - 124/61)  BP(mean): --  RR: 18 (27 Apr 2021 05:44) (18 - 18)  SpO2: 89% (27 Apr 2021 05:44) (88% - 96%)    PHYSICAL EXAM:  GENERAL: NAD, well-groomed, well-developed  HEAD:  Atraumatic, Normocephalic  EYES: EOMI, PERRLA, conjunctiva and sclera clear  ENMT: No tonsillar erythema, exudates, or enlargement; Moist mucous membranes, Good dentition, No lesions  NECK: Supple, No JVD, Normal thyroid  NERVOUS SYSTEM:  Alert & Oriented X3, Good concentration; Motor Strength 5/5 B/L upper and lower extremities; DTRs 2+ intact and symmetric  CHEST/LUNG: Clear to percussion bilaterally; No rales, rhonchi, wheezing, or rubs  HEART: Regular rate and rhythm; No murmurs, rubs, or gallops  ABDOMEN: Soft, Nontender, Nondistended; Bowel sounds present  EXTREMITIES:  2+ Peripheral Pulses, No clubbing, cyanosis, or edema  LYMPH: No lymphadenopathy noted  SKIN: No rashes or lesions    LABS:                        11.5   22.68 )-----------( 329      ( 26 Apr 2021 08:24 )             34.6     04-27    x   |  x   |  x   ----------------------------<  x   x    |  x   |  0.50    Ca    7.9<L>      26 Apr 2021 08:23    TPro  5.8<L>  /  Alb  1.9<L>  /  TBili  0.3  /  DBili  .06  /  AST  29  /  ALT  21  /  AlkPhos  90  04-27          CAPILLARY BLOOD GLUCOSE      POCT Blood Glucose.: 204 mg/dL (27 Apr 2021 08:21)  POCT Blood Glucose.: 45 mg/dL (27 Apr 2021 07:36)  POCT Blood Glucose.: 106 mg/dL (26 Apr 2021 22:16)  POCT Blood Glucose.: 315 mg/dL (26 Apr 2021 16:16)  POCT Blood Glucose.: 235 mg/dL (26 Apr 2021 11:09)                RADIOLOGY & ADDITIONAL TESTS:    Imaging Personally Reviewed:  [ ] YES  [ ] NO    Consultant(s) Notes Reviewed:  [ ] YES  [ ] NO    Care Discussed with Consultants/Other Providers [ ] YES  [ ] NO    Care discussed with family,         [  ]   yes  [  ]  No    imp:    stable[ ]    unstable[  ]     improving [ x  ]       unchanged  [  ]                Plans:  Continue present plans  [x  ] continue to supplement oxygen. , diabetic diet, Metformin               New consult [  ]   specialty  .......               order test[  ]    test name.  Lipase in am, , amylase in am, cbc, bmp, ferritin,  d-dimer, crp,  procalcitonin - in AM                Discharge Planning  [  ]

## 2021-04-27 NOTE — PROGRESS NOTE ADULT - SUBJECTIVE AND OBJECTIVE BOX
TMAX - 98    Completed 10 days of rx now with Remdesivir and Decadron / remains off ab rx    Vital Signs Last 24 Hrs  T(C): 36.8 (2021 15:31), Max: 36.8 (2021 15:31)  T(F): 98.3 (2021 15:31), Max: 98.3 (2021 15:31)  HR: 84 (2021 15:31) (58 - 110)  BP: 119/73 (2021 15:31) (119/68 - 124/61)  BP(mean): --  RR: 18 (2021 15:31) (18 - 18)  SpO2: 90% (2021 15:31) (88% - 94%)  Supplemental O2:  on VM O2 40% now      Awake, alert, c/o SOB with any movement now on VM O2.  Claims she felt less SOB with the NRB O2.  No further coughing and no c/o cp or abdominal pain.  Appetite is fair and drinking liquids well.  O2 Sat's ranging from 88 - 97%.      PHYSICAL EXAM  General:  69 y/o female awake, alert, sitting up in the chair now,  pleasant and cooperative appears very fatigued with VM O2 in place, but in NAD  HEENT:  conj pink, sclerae anicteric, PERRLA, no oral lesions noted but mucosa slightly dry  Neck:  supple, no nodes noted  Heart:  RR  Lungs:  decreased BS at the bases bilaterally otherwise clear  Abdomen:  soft, BS+, nontender to palpation, no masses or HS-megaly detected  Back: no CVA or Spinal tenderness elicited to palpation  Extremities:  no edema LE's now                     no calf tenderness noted  Skin:  warm, dry, no rash noted      I&O's Summary :    2021 07:01  -  2021 07:00  --------------------------------------------------------  IN: 600 mL / OUT: 0 mL / NET: 600 mL      LABS:  CBC Full  -  ( 2021 08:24 )  WBC Count : 22.68 K/uL  RBC Count : 3.83 M/uL  Hemoglobin : 11.5 g/dL  Hematocrit : 34.6 %  Platelet Count - Automated : 329 K/uL  Mean Cell Volume : 90.3 fl  Mean Cell Hemoglobin : 30.0 pg  Mean Cell Hemoglobin Concentration : 33.2 gm/dL  Auto Neutrophil # : 20.45 K/uL  Auto Lymphocyte # : 0.82 K/uL  Auto Monocyte # : 0.81 K/uL  Auto Eosinophil # : 0.35 K/uL  Auto Basophil # : 0.03 K/uL  Auto Neutrophil % : 90.2 %  Auto Lymphocyte % : 3.6 %  Auto Monocyte % : 3.6 %  Auto Eosinophil % : 1.5 %  Auto Basophil % : 0.1 %        x   |  x   |  x   ----------------------------<  x   x    |  x   |  0.50    Ca    7.9<L>      2021 08:23    TPro  5.8<L>  /  Alb  1.9<L>  /  TBili  0.3  /  DBili  .06  /  AST  29  /  ALT  21  /  AlkPhos  90      LIVER FUNCTIONS - ( 2021 08:14 )  Alb: 1.9 g/dL / Pro: 5.8 gm/dL / ALK PHOS: 90 U/L / ALT: 21 U/L / AST: 29 U/L / GGT: x           Urinalysis Basic - ( 2021 17:35 )  Color: Yellow / Appearance: Clear / S.020 / pH: x  Gluc: x / Ketone: Negative  / Bili: Negative / Urobili: Negative mg/dL   Blood: x / Protein: Negative mg/dL / Nitrite: Positive   Leuk Esterase: Small / RBC: 0-2 /HPF / WBC 11-25   Sq Epi: x / Non Sq Epi: Occasional / Bacteria: Many    Sedimentation Rate, Erythrocyte: 40 mm/hr ( @ 08:24)  Sedimentation Rate, Erythrocyte: 48 mm/hr ( @ 09:39)  Sedimentation Rate, Erythrocyte: 52 mm/hr ( @ 08:04)  Sedimentation Rate, Erythrocyte: 25 mm/hr ( @ 07:24)  Sedimentation Rate, Erythrocyte: 97 mm/hr ( @ 07:08)    C-Reactive Protein, Serum (21 @ 10:38)    C-Reactive Protein, Serum: 68: Please note: Reference range has changed due to units of measure change. mg/L  C-Reactive Protein, Serum (21 @ 10:04)    C-Reactive Protein, Serum: 78: Please note: Reference range has changed due to units of measure change. mg/L  C-Reactive Protein, Serum (21 @ 10:51)    C-Reactive Protein, Serum: 46: Please note: Reference range has changed due to units of measure change. mg/L  C-Reactive Protein, Serum (21 @ 10:47)    C-Reactive Protein, Serum: 226: Please note: Reference range has changed due to units of measure change. mg/L    Procalcitonin, Serum (21 @ 10:51)    Procalcitonin, Serum: 0.06:   Procalcitonin, Serum (21 @ 10:59)    Procalcitonin, Serum: 0.10:   Procalcitonin, Serum (21 @ 10:47)    Procalcitonin, Serum: 0.11  Procalcitonin, Serum (21 @ 01:25)    Procalcitonin, Serum: 0.08:    A1C with Estimated Average Glucose (21 @ 10:40)    A1C with Estimated Average Glucose Result: 13.5: Method: Immunoassay       Reference Range                4.0-5.6%       High risk (prediabetic)        5.7-6.4%       Diabetic, diagnostic             >=6.5%       ADA diabetic treatment goal       <7.0%    Estimated Average Glucose: 341    Ferritin, Serum in AM (21 @ 13:08)    Ferritin, Serum: 628 ng/mL  Ferritin, Serum in AM (21 @ 10:02)    Ferritin, Serum: 512 ng/mL  Ferritin, Serum in AM (21 @ 10:48)    Ferritin, Serum: 585 ng/mL  Ferritin, Serum in AM (21 @ 10:58)    Ferritin, Serum: 794 ng/mL  Ferritin, Serum (21 @ 01:26)    Ferritin, Serum: 612 ng/mL    Lactate, Blood (21 @ 21:55)    Lactate, Blood: 1.2 mmol/L  Lactate, Blood (21 @ 17:42)    Lactate, Blood: 1.3 mmol/L      Lactate Dehydrogenase, Serum in AM (21 @ 10:04)    Lactate Dehydrogenase, Serum: 479 U/L  Lactate Dehydrogenase, Serum (21 @ 10:59)    Lactate Dehydrogenase, Serum: 415 U/L  Lactate Dehydrogenase, Serum (21 @ 01:25)    Lactate Dehydrogenase, Serum: 479: Mild hemolysis. Results may be falsely elevated. U/L      D-Dimer Assay, Quantitative (21 @ 08:23)    D-Dimer Assay, Quantitative: 3678 ng/mL DDU    D-Dimer Assay, Quantitative (21 @ 08:11)    D-Dimer Assay, Quantitative: 49937 ng/mL DDU    D-Dimer Assay, Quantitative (21 @ 08:16)    D-Dimer Assay, Quantitative: 7816 ng/mL DDU    D-Dimer Assay, Quantitative (21 @ 08:04)    D-Dimer Assay, Quantitative: 6019 ng/mL DDU    D-Dimer Assay, Quantitative (21 @ 20:49)    D-Dimer Assay, Quantitative: 267 ng/mL DDU    D-Dimer Assay, Quantitative (21 @ 07:08)    D-Dimer Assay, Quantitative: 248 ng/mL DDU    D-Dimer Assay, Quantitative (21 @ 17:42)    D-Dimer Assay, Quantitative: 222 ng/mL DDU      MICROBIOLOGY:    COVID-19 Erwin Domain Antibody (21 @ 10:45)    COVID-19 Erwin Domain Antibody Result: 0.40: Roche ECLIA Total AB (AKILAH)  NOTE: This result index represents a total antibody measurement, which  includes IgG, IgA and IgM.  Measures Receptor Binding Domain of the Erwin Protein  Negative <= 0.79 U/mL  Positive  >= 0.80 U/mL U/mL    COVID-19 Erwin Domain AB Interp: Negative    Flu With COVID-19 By KELLI (21 @ 18:31)    SARS-CoV-2 Result: Detected: EUA/IVD    Influenza A Result: NotDetec: EUA/IVD    Influenza B Result: NotDetec: EUA/IVD      RADIOLOGY:         < from: CT Angio Chest w/ IV Cont (21 @ 19:01) >  EXAM:  CT ANGIO CHEST (W)AW IC                        PROCEDURE DATE:  2021    INTERPRETATION:  CLINICAL INFORMATION: Shortness of breath. COVID positive.  COMPARISON: Chest x-ray of 2021.  CONTRAST/COMPLICATIONS:  IV Contrast: Omnipaque 350  60 cc administered   40 cc discarded  Oral Contrast: NONE  Complications: None reported at time of study completion  PROCEDURE:  CT Angiography of the Chest.  Sagittal and coronal reformats were performed as well as 3D (MIP) reconstructions.  FINDINGS:  LUNGS AND AIRWAYS: Patent central airways.  The lungs demonstrate bilateral multi focal groundglass opacities most prominent in the midlungs suggestive of atypical pneumonia such as that from COVID 19. Punctate calcified left lower lobe granuloma.  PLEURA: No pleural effusion.  MEDIASTINUM AND JUNIOR: No lymphadenopathy. Punctate left hilar calcification  VESSELS: There is atherosclerotic calcification of the aorta  HEART: Heart size is normal. No pericardial effusion.  CHEST WALL AND LOWER NECK: Within normal limits.  VISUALIZED UPPER ABDOMEN: Within normal limits.  BONES: Degenerative change of the spine  IMPRESSION: No evidence of pulmonary embolism  Bilateral multifocal infiltrates suggestive of pneumonia associated with COVID 19. Lines hilar calcifications and calcified left lower lobe granuloma consistent with old granulomatous disease                     < from: Xray Chest 1 View- PORTABLE-Urgent (21 @ 17:26) >  EXAM:  XR CHEST PORTABLE URGENT 1V                        PROCEDURE DATE:  2021    INTERPRETATION:  Chest one view  HISTORY: Shortness of breath, cough and fever  COMPARISON STUDY: None available  Frontal expiratory view of the chest shows the heart to be slightly enlarged in size. The lungs show bilateral patchy pulmonary infiltrates and there is no evidence of pneumothorax nor pleural effusion.  IMPRESSION:  Patchy infiltrates. Differential diagnosis includes Covid-19 pneumonia among other possibilities.        Impression:   69 y/o female with hx of DM reportedly diet- controlled but otherwise in good health, admitted via the ER on 21 with c/o worsening SOB and cough with associated pleuritic -type cp over the prior few days after testing positive for COVID-19 by PCR a few days PTA and noted by her niece who is an RN to have decreased O2 Sat to 84% at home.  In the ER patient was begun on 3 Liters NC O2 and rx with Remdesivir and Decadron was begun.  Initial CXR revealed bilateral patchy pulmonary infiltrates consistent with dx of COVID-19 Multifocal PNA.   CT Angio of the Chest was done on 21 and confirmed  Bilateral Multifocal Infiltrates but no evidence of PE, but with evidence of old granulomatous disease involving the LLL. O2 Sat's have remained low and patient now requiring NRB O2.  WBC's are elevated - likely secondary to steroids.  Further w/u with HGB A1C was found to be very elevated consistent with poorly-controlled DM at home.  Now receiving Insulin rx for improved control of her DM.  Noted with elevated WBC's over the past few days  but now with decreasing D- dimer, but still with labored breathing and now with 10days of rx with Remdesivir and Decadron completed.    Suggestions:  Will therefore continue to observe closely off Remdesivir and Decadron and  follow-up temps and labs closely.  Monitor O2 Sat's.  Maintain Airborne/ Contact Isolation for COVID-19 Multifocal PNA.  OOB to chair as tolerated.  Discussed in detail with patient at the bedside.

## 2021-04-27 NOTE — PROVIDER CONTACT NOTE (CRITICAL VALUE NOTIFICATION) - ACTION/TREATMENT ORDERED:
apple juice provided. FS rechecked and noted improved at 123mg/dl. PA/NP made aware,
dextrose 50 activated. insulin dose lowered

## 2021-04-27 NOTE — PROGRESS NOTE ADULT - SUBJECTIVE AND OBJECTIVE BOX
INTERVAL HPI:  71 yo F with diet controlled DM, who tested positive for COVID 3 days prior to her presentation.  Presented  with complaints of shortness of breath and low oxygen levels noted on pulse oximeter. Patient states her daughter tested positive, unsure which day, and prompted her and her  to get tested. States she has been otherwise asymptomatic until today. Denies cp. No fevers., however has fever her of 101F in the ED.  Non smoker    OVERNIGHT EVENTS:  Still requiring Venti mask.    Vital Signs Last 24 Hrs  T(C): 36.8 (2021 15:31), Max: 36.8 (2021 15:31)  T(F): 98.3 (2021 15:31), Max: 98.3 (2021 15:31)  HR: 84 (2021 15:31) (58 - 110)  BP: 119/73 (2021 15:31) (119/68 - 124/61)  BP(mean): --  RR: 18 (2021 15:31) (18 - 18)  SpO2: 90% (2021 15:31) (88% - 94%)    PHYSICAL EXAM:  SPO2 in low 90s on Venti mask.    MEDICATIONS  (STANDING):  ascorbic acid 500 milliGRAM(s) Oral daily  dextrose 40% Gel 15 Gram(s) Oral once  dextrose 5%. 1000 milliLiter(s) (50 mL/Hr) IV Continuous <Continuous>  dextrose 5%. 1000 milliLiter(s) (100 mL/Hr) IV Continuous <Continuous>  dextrose 50% Injectable 25 Gram(s) IV Push once  dextrose 50% Injectable 12.5 Gram(s) IV Push once  dextrose 50% Injectable 25 Gram(s) IV Push once  enoxaparin Injectable 60 milliGRAM(s) SubCutaneous every 12 hours  glucagon  Injectable 1 milliGRAM(s) IntraMuscular once  insulin glargine Injectable (LANTUS) 6 Unit(s) SubCutaneous at bedtime  insulin lispro (ADMELOG) corrective regimen sliding scale   SubCutaneous three times a day before meals  insulin lispro (ADMELOG) corrective regimen sliding scale   SubCutaneous at bedtime  insulin lispro Injectable (ADMELOG) 8 Unit(s) SubCutaneous three times a day before meals  metFORMIN 1000 milliGRAM(s) Oral every 12 hours  senna 2 Tablet(s) Oral at bedtime  zinc sulfate 220 milliGRAM(s) Oral daily    MEDICATIONS  (PRN):  acetaminophen   Tablet .. 650 milliGRAM(s) Oral every 4 hours PRN Temp greater or equal to 38.5C (101.3F)  magnesium hydroxide Suspension 30 milliLiter(s) Oral daily PRN Constipation  melatonin 3 milliGRAM(s) Oral once PRN Insomnia    LABS:                        11.5   22.68 )-----------( 329      ( 2021 08:24 )             34.6         x   |  x   |  x   ----------------------------<  x   x    |  x   |  0.50    Ca    7.9<L>      2021 08:23    TPro  5.8<L>  /  Alb  1.9<L>  /  TBili  0.3  /  DBili  .06  /  AST  29  /  ALT  21  /  AlkPhos  90      Urinalysis Basic - ( 2021 17:35 )    Color: Yellow / Appearance: Clear / S.020 / pH: x  Gluc: x / Ketone: Negative  / Bili: Negative / Urobili: Negative mg/dL   Blood: x / Protein: Negative mg/dL / Nitrite: Positive   Leuk Esterase: Small / RBC: x / WBC x   Sq Epi: x / Non Sq Epi: x / Bacteria: x    ASSESSMENT AND PLAN:  ·	Acute hypoxic Respiratory failure.  ·	COVID pneumonia.  ·	Diet controlled DM.  ·	Leukocytosis, on Dexamethasone.  ·	Hypokalemia.    SPO2 in low 90s  on 50% Venti  mask,   Continue Remdesivir and Dexamethasone.  [place]  full dose [place] [id number].  Will repeat CXR.     INTERVAL HPI:  71 yo F with diet controlled DM, who tested positive for COVID 3 days prior to her presentation.  Presented  with complaints of shortness of breath and low oxygen levels noted on pulse oximeter. Patient states her daughter tested positive, unsure which day, and prompted her and her  to get tested. States she has been otherwise asymptomatic until today. Denies cp. No fevers., however has fever her of 101F in the ED.  Non smoker    OVERNIGHT EVENTS:  Still requiring Venti mask.    Vital Signs Last 24 Hrs  T(C): 36.8 (2021 15:31), Max: 36.8 (2021 15:31)  T(F): 98.3 (2021 15:31), Max: 98.3 (2021 15:31)  HR: 84 (2021 15:31) (58 - 110)  BP: 119/73 (2021 15:31) (119/68 - 124/61)  BP(mean): --  RR: 18 (2021 15:31) (18 - 18)  SpO2: 90% (2021 15:31) (88% - 94%)    PHYSICAL EXAM:  SPO2 in low 90s on Venti mask.    MEDICATIONS  (STANDING):  ascorbic acid 500 milliGRAM(s) Oral daily  dextrose 40% Gel 15 Gram(s) Oral once  dextrose 5%. 1000 milliLiter(s) (50 mL/Hr) IV Continuous <Continuous>  dextrose 5%. 1000 milliLiter(s) (100 mL/Hr) IV Continuous <Continuous>  dextrose 50% Injectable 25 Gram(s) IV Push once  dextrose 50% Injectable 12.5 Gram(s) IV Push once  dextrose 50% Injectable 25 Gram(s) IV Push once  enoxaparin Injectable 60 milliGRAM(s) SubCutaneous every 12 hours  glucagon  Injectable 1 milliGRAM(s) IntraMuscular once  insulin glargine Injectable (LANTUS) 6 Unit(s) SubCutaneous at bedtime  insulin lispro (ADMELOG) corrective regimen sliding scale   SubCutaneous three times a day before meals  insulin lispro (ADMELOG) corrective regimen sliding scale   SubCutaneous at bedtime  insulin lispro Injectable (ADMELOG) 8 Unit(s) SubCutaneous three times a day before meals  metFORMIN 1000 milliGRAM(s) Oral every 12 hours  senna 2 Tablet(s) Oral at bedtime  zinc sulfate 220 milliGRAM(s) Oral daily    MEDICATIONS  (PRN):  acetaminophen   Tablet .. 650 milliGRAM(s) Oral every 4 hours PRN Temp greater or equal to 38.5C (101.3F)  magnesium hydroxide Suspension 30 milliLiter(s) Oral daily PRN Constipation  melatonin 3 milliGRAM(s) Oral once PRN Insomnia    LABS:                        11.5   22.68 )-----------( 329      ( 2021 08:24 )             34.6         x   |  x   |  x   ----------------------------<  x   x    |  x   |  0.50    Ca    7.9<L>      2021 08:23    TPro  5.8<L>  /  Alb  1.9<L>  /  TBili  0.3  /  DBili  .06  /  AST  29  /  ALT  21  /  AlkPhos  90      Urinalysis Basic - ( 2021 17:35 )    Color: Yellow / Appearance: Clear / S.020 / pH: x  Gluc: x / Ketone: Negative  / Bili: Negative / Urobili: Negative mg/dL   Blood: x / Protein: Negative mg/dL / Nitrite: Positive   Leuk Esterase: Small / RBC: x / WBC x   Sq Epi: x / Non Sq Epi: x / Bacteria: x    ASSESSMENT AND PLAN:  ·	Acute hypoxic Respiratory failure.  ·	COVID pneumonia.  ·	Diet controlled DM.  ·	Leukocytosis, on Dexamethasone.  ·	Hypokalemia.    SPO2 in low 90s  on 50% Venti  mask,   Continue Remdesivir and Dexamethasone.  On  full dose Lovenox as D Dimer close to [id number].  CXR from 21 noted.

## 2021-04-28 LAB
ALBUMIN SERPL ELPH-MCNC: 2 G/DL — LOW (ref 3.3–5)
ALP SERPL-CCNC: 97 U/L — SIGNIFICANT CHANGE UP (ref 40–120)
ALT FLD-CCNC: 24 U/L — SIGNIFICANT CHANGE UP (ref 12–78)
AMYLASE P1 CFR SERPL: 67 U/L — SIGNIFICANT CHANGE UP (ref 25–115)
ANION GAP SERPL CALC-SCNC: 5 MMOL/L — SIGNIFICANT CHANGE UP (ref 5–17)
AST SERPL-CCNC: 31 U/L — SIGNIFICANT CHANGE UP (ref 15–37)
BILIRUB DIRECT SERPL-MCNC: 0.11 MG/DL — SIGNIFICANT CHANGE UP (ref 0.05–0.2)
BILIRUB INDIRECT FLD-MCNC: 0.3 MG/DL — SIGNIFICANT CHANGE UP (ref 0.2–1)
BILIRUB SERPL-MCNC: 0.4 MG/DL — SIGNIFICANT CHANGE UP (ref 0.2–1.2)
BUN SERPL-MCNC: 14 MG/DL — SIGNIFICANT CHANGE UP (ref 7–23)
CALCIUM SERPL-MCNC: 8.3 MG/DL — LOW (ref 8.5–10.1)
CHLORIDE SERPL-SCNC: 106 MMOL/L — SIGNIFICANT CHANGE UP (ref 96–108)
CO2 SERPL-SCNC: 31 MMOL/L — SIGNIFICANT CHANGE UP (ref 22–31)
CREAT SERPL-MCNC: 0.54 MG/DL — SIGNIFICANT CHANGE UP (ref 0.5–1.3)
CREAT SERPL-MCNC: 0.59 MG/DL — SIGNIFICANT CHANGE UP (ref 0.5–1.3)
CRP SERPL-MCNC: 91 MG/L — HIGH
D DIMER BLD IA.RAPID-MCNC: 847 NG/ML DDU — HIGH
FERRITIN SERPL-MCNC: 584 NG/ML — HIGH (ref 15–150)
GLUCOSE BLDC GLUCOMTR-MCNC: 151 MG/DL — HIGH (ref 70–99)
GLUCOSE BLDC GLUCOMTR-MCNC: 167 MG/DL — HIGH (ref 70–99)
GLUCOSE BLDC GLUCOMTR-MCNC: 195 MG/DL — HIGH (ref 70–99)
GLUCOSE BLDC GLUCOMTR-MCNC: 87 MG/DL — SIGNIFICANT CHANGE UP (ref 70–99)
GLUCOSE SERPL-MCNC: 72 MG/DL — SIGNIFICANT CHANGE UP (ref 70–99)
HCT VFR BLD CALC: 35 % — SIGNIFICANT CHANGE UP (ref 34.5–45)
HGB BLD-MCNC: 11.6 G/DL — SIGNIFICANT CHANGE UP (ref 11.5–15.5)
LIDOCAIN IGE QN: 109 U/L — SIGNIFICANT CHANGE UP (ref 73–393)
MCHC RBC-ENTMCNC: 30.4 PG — SIGNIFICANT CHANGE UP (ref 27–34)
MCHC RBC-ENTMCNC: 33.1 GM/DL — SIGNIFICANT CHANGE UP (ref 32–36)
MCV RBC AUTO: 91.6 FL — SIGNIFICANT CHANGE UP (ref 80–100)
NRBC # BLD: 0 /100 WBCS — SIGNIFICANT CHANGE UP (ref 0–0)
PLATELET # BLD AUTO: 364 K/UL — SIGNIFICANT CHANGE UP (ref 150–400)
POTASSIUM SERPL-MCNC: 4.5 MMOL/L — SIGNIFICANT CHANGE UP (ref 3.5–5.3)
POTASSIUM SERPL-SCNC: 4.5 MMOL/L — SIGNIFICANT CHANGE UP (ref 3.5–5.3)
PROCALCITONIN SERPL-MCNC: 0.12 NG/ML — HIGH (ref 0.02–0.1)
PROT SERPL-MCNC: 6 GM/DL — SIGNIFICANT CHANGE UP (ref 6–8.3)
RBC # BLD: 3.82 M/UL — SIGNIFICANT CHANGE UP (ref 3.8–5.2)
RBC # FLD: 12.8 % — SIGNIFICANT CHANGE UP (ref 10.3–14.5)
SODIUM SERPL-SCNC: 142 MMOL/L — SIGNIFICANT CHANGE UP (ref 135–145)
WBC # BLD: 15.52 K/UL — HIGH (ref 3.8–10.5)
WBC # FLD AUTO: 15.52 K/UL — HIGH (ref 3.8–10.5)

## 2021-04-28 RX ADMIN — Medication 2: at 11:33

## 2021-04-28 RX ADMIN — ENOXAPARIN SODIUM 60 MILLIGRAM(S): 100 INJECTION SUBCUTANEOUS at 17:16

## 2021-04-28 RX ADMIN — Medication 2: at 17:15

## 2021-04-28 RX ADMIN — ZINC SULFATE TAB 220 MG (50 MG ZINC EQUIVALENT) 220 MILLIGRAM(S): 220 (50 ZN) TAB at 11:34

## 2021-04-28 RX ADMIN — METFORMIN HYDROCHLORIDE 1000 MILLIGRAM(S): 850 TABLET ORAL at 17:16

## 2021-04-28 RX ADMIN — Medication 500 MILLIGRAM(S): at 11:34

## 2021-04-28 RX ADMIN — Medication 1 TABLET(S): at 17:16

## 2021-04-28 RX ADMIN — METFORMIN HYDROCHLORIDE 1000 MILLIGRAM(S): 850 TABLET ORAL at 05:14

## 2021-04-28 RX ADMIN — ENOXAPARIN SODIUM 60 MILLIGRAM(S): 100 INJECTION SUBCUTANEOUS at 05:14

## 2021-04-28 NOTE — PROGRESS NOTE ADULT - SUBJECTIVE AND OBJECTIVE BOX
INTERVAL HPI:  69 yo F with diet controlled DM, who tested positive for COVID 3 days prior to her presentation.  Presented  with complaints of shortness of breath and low oxygen levels noted on pulse oximeter. Patient states her daughter tested positive, unsure which day, and prompted her and her  to get tested. States she has been otherwise asymptomatic until today. Denies cp. No fevers., however has fever her of 101F in the ED.  Non smoker    OVERNIGHT EVENTS:  Tolerating nasal O2.    Vital Signs Last 24 Hrs  T(C): 36.8 (2021 16:47), Max: 37.6 (2021 11:08)  T(F): 98.2 (2021 16:47), Max: 99.6 (2021 11:08)  HR: 93 (2021 16:47) (79 - 96)  BP: 116/65 (2021 16:47) (96/51 - 116/65)  BP(mean): --  RR: 19 (2021 16:47) (18 - 19)  SpO2: 89% (2021 16:47) (89% - 97%)    PHYSICAL EXAM:  GEN:         Awake, responsive and comfortable.  HEENT:    Normal.    RESP:        no distress  CVS:             Regular rate and rhythm.   ABD:         Soft, non-tender, non-distended;     MEDICATIONS  (STANDING):  ascorbic acid 500 milliGRAM(s) Oral daily  dextrose 40% Gel 15 Gram(s) Oral once  dextrose 5%. 1000 milliLiter(s) (50 mL/Hr) IV Continuous <Continuous>  dextrose 5%. 1000 milliLiter(s) (100 mL/Hr) IV Continuous <Continuous>  dextrose 50% Injectable 25 Gram(s) IV Push once  dextrose 50% Injectable 12.5 Gram(s) IV Push once  dextrose 50% Injectable 25 Gram(s) IV Push once  enoxaparin Injectable 60 milliGRAM(s) SubCutaneous every 12 hours  glucagon  Injectable 1 milliGRAM(s) IntraMuscular once  insulin lispro (ADMELOG) corrective regimen sliding scale   SubCutaneous three times a day before meals  insulin lispro (ADMELOG) corrective regimen sliding scale   SubCutaneous at bedtime  metFORMIN 1000 milliGRAM(s) Oral every 12 hours  senna 2 Tablet(s) Oral at bedtime  trimethoprim  160 mG/sulfamethoxazole 800 mG 1 Tablet(s) Oral two times a day  zinc sulfate 220 milliGRAM(s) Oral daily    MEDICATIONS  (PRN):  acetaminophen   Tablet .. 650 milliGRAM(s) Oral every 4 hours PRN Temp greater or equal to 38.5C (101.3F)  magnesium hydroxide Suspension 30 milliLiter(s) Oral daily PRN Constipation  melatonin 3 milliGRAM(s) Oral once PRN Insomnia    LABS:                        11.6   15.52 )-----------( 364      ( 2021 07:35 )             35.0         142  |  106  |  14  ----------------------------<  72  4.5   |  31  |  0.54    Ca    8.3<L>      2021 07:35    TPro  6.0  /  Alb  2.0<L>  /  TBili  0.4  /  DBili  .11  /  AST  31  /  ALT  24  /  AlkPhos  97      Urinalysis Basic - ( 2021 17:35 )    Color: Yellow / Appearance: Clear / S.020 / pH: x  Gluc: x / Ketone: Negative  / Bili: Negative / Urobili: Negative mg/dL   Blood: x / Protein: Negative mg/dL / Nitrite: Positive   Leuk Esterase: Small / RBC: 0-2 /HPF / WBC 11-25   Sq Epi: x / Non Sq Epi: Occasional / Bacteria: Many    ASSESSMENT AND PLAN:  ·	Acute hypoxic Respiratory failure.  ·	COVID pneumonia.  ·	Diet controlled DM.  ·	Leukocytosis, on Dexamethasone.  ·	Hypokalemia.    Completed  Remdesivir and Dexamethasone.  On  full dose Lovenox as [place] to [id number].  Tolerating nasal O2.

## 2021-04-28 NOTE — PROGRESS NOTE ADULT - SUBJECTIVE AND OBJECTIVE BOX
TMAX - 99.6    On day # 1 Bactrim DS po / rx with Remdesivir x 10 days and Decadron has been completed    Vital Signs Last 24 Hrs  T(C): 36.8 (2021 16:47), Max: 37.6 (2021 11:08)  T(F): 98.2 (2021 16:47), Max: 99.6 (2021 11:08)  HR: 93 (2021 16:47) (79 - 96)  BP: 116/65 (2021 16:47) (96/51 - 116/65)  BP(mean): --  RR: 19 (2021 16:47) (18 - 19)  SpO2: 89% (2021 16:47) (89% - 97%)  Supplemental O2:  on NC O2 now      Awake, alert, claims to be feeling a little better today and  changed to NC O2 now with O2 Sat's from 89 - 94%.  Claims her cough is decreasing and dry now and she feels less SOB except when she exerts herself.  Appetite is good and she is drinking liquids.  Had some dysuria yesterday but reports it is better today and she is drinking diet Cranberry juice.  Urine Cx just obtained.  No c/o fevers or chills.  Begun on po Bactrim DS today.      PHYSICAL EXAM  PHYSICAL EXAM  General:  69 y/o female awake, alert, sitting semi-upright in bed now,  pleasant and cooperative appears fatigued but more comfortable with NC O2 in place, in NAD  HEENT:  conj pink, sclerae anicteric, PERRLA, no oral lesions noted but mucosa slightly dry  Neck:  supple, no nodes noted  Heart:  RR  Lungs:  improved BS now throughout  Abdomen:  soft, BS+, nontender to palpation, no masses or HS-megaly detected  Back: no CVA or Spinal tenderness elicited to palpation  Extremities:  no edema LE's now                     no calf tenderness noted  Skin:  warm, dry, no rash noted      I&O's Summary    LABS:  CBC Full  -  ( 2021 07:35 )  WBC Count : 15.52 K/uL  RBC Count : 3.82 M/uL  Hemoglobin : 11.6 g/dL  Hematocrit : 35.0 %  Platelet Count - Automated : 364 K/uL  Mean Cell Volume : 91.6 fl  Mean Cell Hemoglobin : 30.4 pg  Mean Cell Hemoglobin Concentration : 33.1 gm/dL  Auto Neutrophil # : x  Auto Lymphocyte # : x  Auto Monocyte # : x  Auto Eosinophil # : x  Auto Basophil # : x  Auto Neutrophil % : x  Auto Lymphocyte % : x  Auto Monocyte % : x  Auto Eosinophil % : x  Auto Basophil % : x        142  |  106  |  14  ----------------------------<  72  4.5   |  31  |  0.54    Ca    8.3<L>      2021 07:35    TPro  6.0  /  Alb  2.0<L>  /  TBili  0.4  /  DBili  .11  /  AST  31  /  ALT  24  /  AlkPhos  97      LIVER FUNCTIONS - ( 2021 07:35 )  Alb: 2.0 g/dL / Pro: 6.0 gm/dL / ALK PHOS: 97 U/L / ALT: 24 U/L / AST: 31 U/L / GGT: x         rinalysis Basic - ( 2021 17:35 )  Color: Yellow / Appearance: Clear / S.020 / pH: x  Gluc: x / Ketone: Negative  / Bili: Negative / Urobili: Negative mg/dL   Blood: x / Protein: Negative mg/dL / Nitrite: Positive   Leuk Esterase: Small / RBC: 0-2 /HPF / WBC 11-25   Sq Epi: x / Non Sq Epi: Occasional / Bacteria: Many    Sedimentation Rate, Erythrocyte: 40 mm/hr ( @ 08:24)  Sedimentation Rate, Erythrocyte: 48 mm/hr ( @ 09:39)  Sedimentation Rate, Erythrocyte: 52 mm/hr ( @ 08:04)  Sedimentation Rate, Erythrocyte: 25 mm/hr ( @ 07:24)  Sedimentation Rate, Erythrocyte: 97 mm/hr ( @ 07:08)    C-Reactive Protein, Serum (21 @ 11:40)    C-Reactive Protein, Serum: 91: Please note: Reference range has changed due to units of measure change. mg/L  C-Reactive Protein, Serum (21 @ 10:38)    C-Reactive Protein, Serum: 68: Please note: Reference range has changed due to units of measure change. mg/L  C-Reactive Protein, Serum (21 @ 10:04)    C-Reactive Protein, Serum: 78: Please note: Reference range has changed due to units of measure change. mg/L  C-Reactive Protein, Serum (21 @ 10:51)    C-Reactive Protein, Serum: 46: Please note: Reference range has changed due to units of measure change. mg/L  C-Reactive Protein, Serum (21 @ 10:47)    C-Reactive Protein, Serum: 226: Please note: Reference range has changed due to units of measure change. mg/L    Procalcitonin, Serum (21 @ 11:40)    Procalcitonin, Serum: 0.12:   Procalcitonin, Serum (21 @ 10:51)    Procalcitonin, Serum: 0.06:   Procalcitonin, Serum (21 @ 10:59)    Procalcitonin, Serum: 0.10:   Procalcitonin, Serum (21 @ 10:47)    Procalcitonin, Serum: 0.11  Procalcitonin, Serum (21 @ 01:25)    Procalcitonin, Serum: 0.08:    A1C with Estimated Average Glucose (21 @ 10:40)    A1C with Estimated Average Glucose Result: 13.5: Method: Immunoassay       Reference Range                4.0-5.6%       High risk (prediabetic)        5.7-6.4%       Diabetic, diagnostic             >=6.5%       ADA diabetic treatment goal       <7.0%    Estimated Average Glucose: 341    Ferritin, Serum in AM (21 @ 11:29)    Ferritin, Serum: 584 ng/mL  Ferritin, Serum in AM (21 @ 13:08)    Ferritin, Serum: 628 ng/mL  Ferritin, Serum in AM (21 @ 10:02)    Ferritin, Serum: 512 ng/mL  Ferritin, Serum in AM (21 @ 10:48)    Ferritin, Serum: 585 ng/mL  Ferritin, Serum in AM (21 @ 10:58)    Ferritin, Serum: 794 ng/mL  Ferritin, Serum (21 @ 01:26)    Ferritin, Serum: 612 ng/mL    Lactate, Blood (21 @ 21:55)    Lactate, Blood: 1.2 mmol/L  Lactate, Blood (21 @ 17:42)    Lactate, Blood: 1.3 mmol/L      Lactate Dehydrogenase, Serum in AM (21 @ 10:04)    Lactate Dehydrogenase, Serum: 479 U/L  Lactate Dehydrogenase, Serum (21 @ 10:59)    Lactate Dehydrogenase, Serum: 415 U/L  Lactate Dehydrogenase, Serum (21 @ 01:25)    Lactate Dehydrogenase, Serum: 479: Mild hemolysis. Results may be falsely elevated. U/L    D-Dimer Assay, Quantitative (21 @ 07:35)    D-Dimer Assay, Quantitative: 847 ng/mL DDU    D-Dimer Assay, Quantitative (21 @ 08:23)    D-Dimer Assay, Quantitative: 3678 ng/mL DDU  D-Dimer Assay, Quantitative (21 @ 08:11)    D-Dimer Assay, Quantitative: 00954 ng/mL DDU  D-Dimer Assay, Quantitative (21 @ 08:16)    D-Dimer Assay, Quantitative: 7816 ng/mL DDU  D-Dimer Assay, Quantitative (21 @ 08:04)    D-Dimer Assay, Quantitative: 6019 ng/mL DDU  D-Dimer Assay, Quantitative (21 @ 20:49)    D-Dimer Assay, Quantitative: 267 ng/mL DDU  D-Dimer Assay, Quantitative (21 @ 07:08)    D-Dimer Assay, Quantitative: 248 ng/mL DDU  D-Dimer Assay, Quantitative (21 @ 17:42)    D-Dimer Assay, Quantitative: 222 ng/mL DDU    Lipase, Serum: 109 U/L ( @ 07:35)  Amylase, Serum Total: 67 U/L ( @ 07:35)      MICROBIOLOGY:     COVID-19 Erwin Domain Antibody (21 @ 10:45)    COVID-19 Erwin Domain Antibody Result: 0.40: Roche ECLIA Total AB (AKILAH)  NOTE: This result index represents a total antibody measurement, which  includes IgG, IgA and IgM.  Measures Receptor Binding Domain of the Erwin Protein  Negative <= 0.79 U/mL  Positive  >= 0.80 U/mL U/mL    COVID-19 Erwin Domain AB Interp: Negative    Flu With COVID-19 By KELLI (21 @ 18:31)    SARS-CoV-2 Result: Detected: EUA/IVD    Influenza A Result: NotDetec: EUA/IVD    Influenza B Result: NotDetec: EUA/IVD      RADIOLOGY:           < from: Xray Chest 1 View- PORTABLE-Routine (Xray Chest 1 View- PORTABLE-Routine in AM.) (21 @ 07:28) >  EXAM:  XR CHEST PORTABLE ROUTINE 1V                        PROCEDURE DATE:  2021    INTERPRETATION:  Portable chest radiograph  CLINICAL INFORMATION: Pneumonia due to Covid 19.  TECHNIQUE:  Portable  AP view of the chest was obtained.  COMPARISON: 2021 chest available for review.  FINDINGS:  The lungs show stable bilateral  multifocal and diffuse ill-defined airspace opacities.. No pneumothorax.  The  heart is mildly enlarged in transverse diameter. Visualized osseous structures are intact.  IMPRESSION:   No interval change..             < from: CT Angio Chest w/ IV Cont (21 @ 19:01) >  EXAM:  CT ANGIO CHEST (W)AW IC                        PROCEDURE DATE:  2021    INTERPRETATION:  CLINICAL INFORMATION: Shortness of breath. COVID positive.  COMPARISON: Chest x-ray of 2021.  CONTRAST/COMPLICATIONS:  IV Contrast: Omnipaque 350  60 cc administered   40 cc discarded  Oral Contrast: NONE  Complications: None reported at time of study completion  PROCEDURE:  CT Angiography of the Chest.  Sagittal and coronal reformats were performed as well as 3D (MIP) reconstructions.  FINDINGS:  LUNGS AND AIRWAYS: Patent central airways.  The lungs demonstrate bilateral multi focal groundglass opacities most prominent in the midlungs suggestive of atypical pneumonia such as that from COVID 19. Punctate calcified left lower lobe granuloma.  PLEURA: No pleural effusion.  MEDIASTINUM AND JUNIRO: No lymphadenopathy. Punctate left hilar calcification  VESSELS: There is atherosclerotic calcification of the aorta  HEART: Heart size is normal. No pericardial effusion.  CHEST WALL AND LOWER NECK: Within normal limits.  VISUALIZED UPPER ABDOMEN: Within normal limits.  BONES: Degenerative change of the spine  IMPRESSION: No evidence of pulmonary embolism  Bilateral multifocal infiltrates suggestive of pneumonia associated with COVID 19. Lines hilar calcifications and calcified left lower lobe granuloma consistent with old granulomatous disease                     < from: Xray Chest 1 View- PORTABLE-Urgent (21 @ 17:26) >  EXAM:  XR CHEST PORTABLE URGENT 1V                        PROCEDURE DATE:  2021    INTERPRETATION:  Chest one view  HISTORY: Shortness of breath, cough and fever  COMPARISON STUDY: None available  Frontal expiratory view of the chest shows the heart to be slightly enlarged in size. The lungs show bilateral patchy pulmonary infiltrates and there is no evidence of pneumothorax nor pleural effusion.  IMPRESSION:  Patchy infiltrates. Differential diagnosis includes Covid-19 pneumonia among other possibilities.        Impression:   69 y/o female with hx of DM reportedly diet- controlled but otherwise in good health, admitted via the ER on 21 with c/o worsening SOB and cough with associated pleuritic -type cp over the prior few days after testing positive for COVID-19 by PCR a few days PTA and noted by her niece who is an RN to have decreased O2 Sat to 84% at home.  In the ER patient was begun on 3 Liters NC O2 and rx with Remdesivir and Decadron was begun.  Initial CXR revealed bilateral patchy pulmonary infiltrates consistent with dx of COVID-19 Multifocal PNA.   CT Angio of the Chest was done on 21 and confirmed  Bilateral Multifocal Infiltrates but no evidence of PE, but with evidence of old granulomatous disease involving the LLL. O2 Sat's have remained low and patient now requiring NRB O2.  WBC's are elevated - likely secondary to steroids.  Further w/u with HGB A1C was found to be very elevated consistent with poorly-controlled DM at home.  Now receiving Insulin rx for improved control of her DM.  Noted with WBC's now trending downwards ( Decadron rx has been completed ) and now with D- dimer and Ferritin decreasing as well.  10days of rx with Remdesivir and Decadron completed.  Repeat CXR from 21 reported with no change in the Multifocal infiltrates but clinically patient appears to be slowly improving now and presently tolerating NCO2.  U/a with micro noted with some pyuria and patient was begun on Bactrim po empirically with Urine Cx just obtained.    Suggestions:  Will therefore continue to observe closely off Remdesivir and Decadron and continue rx with Bactrim pending Urine Cx results.  Follow-up temps and labs closely.  Monitor O2 Sat's.  Maintain Airborne/ Contact Isolation for COVID-19 Multifocal PNA.  OOB to chair as tolerated.  Discussed in detail with patient at the bedside.               TMAX - 99.6    On day # 1 Bactrim DS po / rx with Remdesivir x 10 days and Decadron has been completed    Vital Signs Last 24 Hrs  T(C): 36.8 (2021 16:47), Max: 37.6 (2021 11:08)  T(F): 98.2 (2021 16:47), Max: 99.6 (2021 11:08)  HR: 93 (2021 16:47) (79 - 96)  BP: 116/65 (2021 16:47) (96/51 - 116/65)  BP(mean): --  RR: 19 (2021 16:47) (18 - 19)  SpO2: 89% (2021 16:47) (89% - 97%)  Supplemental O2:  on NC O2 now      Awake, alert, claims to be feeling a little better today and  changed to NC O2 now with O2 Sat's from 89 - 94%.  Claims her cough is decreasing and dry now and she feels less SOB except when she exerts herself.  Appetite is good and she is drinking liquids.  Had some dysuria yesterday but reports it is better today and she is drinking diet Cranberry juice.  Urine Cx just obtained.  No c/o fevers or chills.  Begun on po Bactrim DS today.      PHYSICAL EXAM  General:  71 y/o female awake, alert, sitting semi-upright in bed now,  pleasant and cooperative appears fatigued but more comfortable with NC O2 in place, in NAD  HEENT:  conj pink, sclerae anicteric, PERRLA, no oral lesions noted but mucosa slightly dry  Neck:  supple, no nodes noted  Heart:  RR  Lungs:  improved BS now throughout  Abdomen:  soft, BS+, nontender to palpation, no masses or HS-megaly detected  Back: no CVA or Spinal tenderness elicited to palpation  Extremities:  no edema LE's now                     no calf tenderness noted  Skin:  warm, dry, no rash noted      I&O's Summary    LABS:  CBC Full  -  ( 2021 07:35 )  WBC Count : 15.52 K/uL  RBC Count : 3.82 M/uL  Hemoglobin : 11.6 g/dL  Hematocrit : 35.0 %  Platelet Count - Automated : 364 K/uL  Mean Cell Volume : 91.6 fl  Mean Cell Hemoglobin : 30.4 pg  Mean Cell Hemoglobin Concentration : 33.1 gm/dL  Auto Neutrophil # : x  Auto Lymphocyte # : x  Auto Monocyte # : x  Auto Eosinophil # : x  Auto Basophil # : x  Auto Neutrophil % : x  Auto Lymphocyte % : x  Auto Monocyte % : x  Auto Eosinophil % : x  Auto Basophil % : x        142  |  106  |  14  ----------------------------<  72  4.5   |  31  |  0.54    Ca    8.3<L>      2021 07:35    TPro  6.0  /  Alb  2.0<L>  /  TBili  0.4  /  DBili  .11  /  AST  31  /  ALT  24  /  AlkPhos  97      LIVER FUNCTIONS - ( 2021 07:35 )  Alb: 2.0 g/dL / Pro: 6.0 gm/dL / ALK PHOS: 97 U/L / ALT: 24 U/L / AST: 31 U/L / GGT: x         rinalysis Basic - ( 2021 17:35 )  Color: Yellow / Appearance: Clear / S.020 / pH: x  Gluc: x / Ketone: Negative  / Bili: Negative / Urobili: Negative mg/dL   Blood: x / Protein: Negative mg/dL / Nitrite: Positive   Leuk Esterase: Small / RBC: 0-2 /HPF / WBC 11-25   Sq Epi: x / Non Sq Epi: Occasional / Bacteria: Many    Sedimentation Rate, Erythrocyte: 40 mm/hr ( @ 08:24)  Sedimentation Rate, Erythrocyte: 48 mm/hr ( @ 09:39)  Sedimentation Rate, Erythrocyte: 52 mm/hr ( @ 08:04)  Sedimentation Rate, Erythrocyte: 25 mm/hr ( @ 07:24)  Sedimentation Rate, Erythrocyte: 97 mm/hr ( @ 07:08)    C-Reactive Protein, Serum (21 @ 11:40)    C-Reactive Protein, Serum: 91: Please note: Reference range has changed due to units of measure change. mg/L  C-Reactive Protein, Serum (21 @ 10:38)    C-Reactive Protein, Serum: 68: Please note: Reference range has changed due to units of measure change. mg/L  C-Reactive Protein, Serum (21 @ 10:04)    C-Reactive Protein, Serum: 78: Please note: Reference range has changed due to units of measure change. mg/L  C-Reactive Protein, Serum (21 @ 10:51)    C-Reactive Protein, Serum: 46: Please note: Reference range has changed due to units of measure change. mg/L  C-Reactive Protein, Serum (21 @ 10:47)    C-Reactive Protein, Serum: 226: Please note: Reference range has changed due to units of measure change. mg/L    Procalcitonin, Serum (21 @ 11:40)    Procalcitonin, Serum: 0.12:   Procalcitonin, Serum (21 @ 10:51)    Procalcitonin, Serum: 0.06:   Procalcitonin, Serum (21 @ 10:59)    Procalcitonin, Serum: 0.10:   Procalcitonin, Serum (21 @ 10:47)    Procalcitonin, Serum: 0.11  Procalcitonin, Serum (21 @ 01:25)    Procalcitonin, Serum: 0.08:    A1C with Estimated Average Glucose (21 @ 10:40)    A1C with Estimated Average Glucose Result: 13.5: Method: Immunoassay       Reference Range                4.0-5.6%       High risk (prediabetic)        5.7-6.4%       Diabetic, diagnostic             >=6.5%       ADA diabetic treatment goal       <7.0%    Estimated Average Glucose: 341    Ferritin, Serum in AM (21 @ 11:29)    Ferritin, Serum: 584 ng/mL  Ferritin, Serum in AM (21 @ 13:08)    Ferritin, Serum: 628 ng/mL  Ferritin, Serum in AM (21 @ 10:02)    Ferritin, Serum: 512 ng/mL  Ferritin, Serum in AM (21 @ 10:48)    Ferritin, Serum: 585 ng/mL  Ferritin, Serum in AM (21 @ 10:58)    Ferritin, Serum: 794 ng/mL  Ferritin, Serum (21 @ 01:26)    Ferritin, Serum: 612 ng/mL    Lactate, Blood (21 @ 21:55)    Lactate, Blood: 1.2 mmol/L  Lactate, Blood (21 @ 17:42)    Lactate, Blood: 1.3 mmol/L      Lactate Dehydrogenase, Serum in AM (21 @ 10:04)    Lactate Dehydrogenase, Serum: 479 U/L  Lactate Dehydrogenase, Serum (21 @ 10:59)    Lactate Dehydrogenase, Serum: 415 U/L  Lactate Dehydrogenase, Serum (21 @ 01:25)    Lactate Dehydrogenase, Serum: 479: Mild hemolysis. Results may be falsely elevated. U/L    D-Dimer Assay, Quantitative (21 @ 07:35)    D-Dimer Assay, Quantitative: 847 ng/mL DDU    D-Dimer Assay, Quantitative (21 @ 08:23)    D-Dimer Assay, Quantitative: 3678 ng/mL DDU  D-Dimer Assay, Quantitative (21 @ 08:11)    D-Dimer Assay, Quantitative: 13391 ng/mL DDU  D-Dimer Assay, Quantitative (21 @ 08:16)    D-Dimer Assay, Quantitative: 7816 ng/mL DDU  D-Dimer Assay, Quantitative (21 @ 08:04)    D-Dimer Assay, Quantitative: 6019 ng/mL DDU  D-Dimer Assay, Quantitative (21 @ 20:49)    D-Dimer Assay, Quantitative: 267 ng/mL DDU  D-Dimer Assay, Quantitative (21 @ 07:08)    D-Dimer Assay, Quantitative: 248 ng/mL DDU  D-Dimer Assay, Quantitative (21 @ 17:42)    D-Dimer Assay, Quantitative: 222 ng/mL DDU    Lipase, Serum: 109 U/L ( @ 07:35)  Amylase, Serum Total: 67 U/L ( @ 07:35)      MICROBIOLOGY:     COVID-19 Erwin Domain Antibody (21 @ 10:45)    COVID-19 Erwin Domain Antibody Result: 0.40: Roche ECLIA Total AB (AKILAH)  NOTE: This result index represents a total antibody measurement, which  includes IgG, IgA and IgM.  Measures Receptor Binding Domain of the Erwin Protein  Negative <= 0.79 U/mL  Positive  >= 0.80 U/mL U/mL    COVID-19 Erwin Domain AB Interp: Negative    Flu With COVID-19 By KELLI (21 @ 18:31)    SARS-CoV-2 Result: Detected: EUA/IVD    Influenza A Result: NotDetec: EUA/IVD    Influenza B Result: NotDetec: EUA/IVD      RADIOLOGY:           < from: Xray Chest 1 View- PORTABLE-Routine (Xray Chest 1 View- PORTABLE-Routine in AM.) (21 @ 07:28) >  EXAM:  XR CHEST PORTABLE ROUTINE 1V                        PROCEDURE DATE:  2021    INTERPRETATION:  Portable chest radiograph  CLINICAL INFORMATION: Pneumonia due to Covid 19.  TECHNIQUE:  Portable  AP view of the chest was obtained.  COMPARISON: 2021 chest available for review.  FINDINGS:  The lungs show stable bilateral  multifocal and diffuse ill-defined airspace opacities.. No pneumothorax.  The  heart is mildly enlarged in transverse diameter. Visualized osseous structures are intact.  IMPRESSION:   No interval change..             < from: CT Angio Chest w/ IV Cont (21 @ 19:01) >  EXAM:  CT ANGIO CHEST (W)AW IC                        PROCEDURE DATE:  2021    INTERPRETATION:  CLINICAL INFORMATION: Shortness of breath. COVID positive.  COMPARISON: Chest x-ray of 2021.  CONTRAST/COMPLICATIONS:  IV Contrast: Omnipaque 350  60 cc administered   40 cc discarded  Oral Contrast: NONE  Complications: None reported at time of study completion  PROCEDURE:  CT Angiography of the Chest.  Sagittal and coronal reformats were performed as well as 3D (MIP) reconstructions.  FINDINGS:  LUNGS AND AIRWAYS: Patent central airways.  The lungs demonstrate bilateral multi focal groundglass opacities most prominent in the midlungs suggestive of atypical pneumonia such as that from COVID 19. Punctate calcified left lower lobe granuloma.  PLEURA: No pleural effusion.  MEDIASTINUM AND JUNIOR: No lymphadenopathy. Punctate left hilar calcification  VESSELS: There is atherosclerotic calcification of the aorta  HEART: Heart size is normal. No pericardial effusion.  CHEST WALL AND LOWER NECK: Within normal limits.  VISUALIZED UPPER ABDOMEN: Within normal limits.  BONES: Degenerative change of the spine  IMPRESSION: No evidence of pulmonary embolism  Bilateral multifocal infiltrates suggestive of pneumonia associated with COVID 19. Lines hilar calcifications and calcified left lower lobe granuloma consistent with old granulomatous disease                     < from: Xray Chest 1 View- PORTABLE-Urgent (21 @ 17:26) >  EXAM:  XR CHEST PORTABLE URGENT 1V                        PROCEDURE DATE:  2021    INTERPRETATION:  Chest one view  HISTORY: Shortness of breath, cough and fever  COMPARISON STUDY: None available  Frontal expiratory view of the chest shows the heart to be slightly enlarged in size. The lungs show bilateral patchy pulmonary infiltrates and there is no evidence of pneumothorax nor pleural effusion.  IMPRESSION:  Patchy infiltrates. Differential diagnosis includes Covid-19 pneumonia among other possibilities.        Impression:   71 y/o female with hx of DM reportedly diet- controlled but otherwise in good health, admitted via the ER on 21 with c/o worsening SOB and cough with associated pleuritic -type cp over the prior few days after testing positive for COVID-19 by PCR a few days PTA and noted by her niece who is an RN to have decreased O2 Sat to 84% at home.  In the ER patient was begun on 3 Liters NC O2 and rx with Remdesivir and Decadron was begun.  Initial CXR revealed bilateral patchy pulmonary infiltrates consistent with dx of COVID-19 Multifocal PNA.   CT Angio of the Chest was done on 21 and confirmed  Bilateral Multifocal Infiltrates but no evidence of PE, but with evidence of old granulomatous disease involving the LLL. O2 Sat's have remained low and patient now requiring NRB O2.  WBC's are elevated - likely secondary to steroids.  Further w/u with HGB A1C was found to be very elevated consistent with poorly-controlled DM at home.  Now receiving Insulin rx for improved control of her DM.  Noted with WBC's now trending downwards ( Decadron rx has been completed ) and now with D- dimer and Ferritin decreasing as well.  10days of rx with Remdesivir and Decadron completed.  Repeat CXR from 21 reported with no change in the Multifocal infiltrates but clinically patient appears to be slowly improving now and presently tolerating NCO2.  U/a with micro noted with some pyuria and patient was begun on Bactrim po empirically with Urine Cx just obtained.    Suggestions:  Will therefore continue to observe closely off Remdesivir and Decadron and continue rx with Bactrim pending Urine Cx results.  Follow-up temps and labs closely.  Monitor O2 Sat's.  Maintain Airborne/ Contact Isolation for COVID-19 Multifocal PNA.  OOB to chair as tolerated.  Discussed in detail with patient at the bedside.

## 2021-04-28 NOTE — PROGRESS NOTE ADULT - SUBJECTIVE AND OBJECTIVE BOX
Patient is a 70y old  Female who presents with a chief complaint of covid PNA with Hypoxia (2021 18:06)  patient still o2 dependent.  on 10litres by ventri mask, 50%  afebrile. no acute distress.  vitals other wise stable   urinalysis shows uti- will get urine culture and start ion bactrim   wbc  trending down.  Blood sugar under control after starting  diabetic diet.    INTERVAL HPI/OVERNIGHT EVENTS:  PAST MEDICAL & SURGICAL HISTORY:      MEDICATIONS  (STANDING):  ascorbic acid 500 milliGRAM(s) Oral daily  dextrose 40% Gel 15 Gram(s) Oral once  dextrose 5%. 1000 milliLiter(s) (50 mL/Hr) IV Continuous <Continuous>  dextrose 5%. 1000 milliLiter(s) (100 mL/Hr) IV Continuous <Continuous>  dextrose 50% Injectable 25 Gram(s) IV Push once  dextrose 50% Injectable 12.5 Gram(s) IV Push once  dextrose 50% Injectable 25 Gram(s) IV Push once  enoxaparin Injectable 60 milliGRAM(s) SubCutaneous every 12 hours  glucagon  Injectable 1 milliGRAM(s) IntraMuscular once  insulin glargine Injectable (LANTUS) 6 Unit(s) SubCutaneous at bedtime  insulin lispro (ADMELOG) corrective regimen sliding scale   SubCutaneous three times a day before meals  insulin lispro (ADMELOG) corrective regimen sliding scale   SubCutaneous at bedtime  insulin lispro Injectable (ADMELOG) 8 Unit(s) SubCutaneous three times a day before meals  metFORMIN 1000 milliGRAM(s) Oral every 12 hours  senna 2 Tablet(s) Oral at bedtime  trimethoprim  160 mG/sulfamethoxazole 800 mG 1 Tablet(s) Oral two times a day  zinc sulfate 220 milliGRAM(s) Oral daily    MEDICATIONS  (PRN):  acetaminophen   Tablet .. 650 milliGRAM(s) Oral every 4 hours PRN Temp greater or equal to 38.5C (101.3F)  magnesium hydroxide Suspension 30 milliLiter(s) Oral daily PRN Constipation  melatonin 3 milliGRAM(s) Oral once PRN Insomnia      Allergies    Allergy Status Unknown    Intolerances        REVIEW OF SYSTEMS:  CONSTITUTIONAL: No fever, weight loss, or fatigue  EYES: No eye pain, visual disturbances, or discharge  ENMT:  No difficulty hearing, tinnitus, vertigo; No sinus or throat pain  NECK: No pain or stiffness  BREASTS: No pain, masses, or nipple discharge  RESPIRATORY: No cough, wheezing, chills or hemoptysis; No shortness of breath  CARDIOVASCULAR: No chest pain, palpitations, dizziness, or leg swelling  GASTROINTESTINAL: No abdominal or epigastric pain. No nausea, vomiting, or hematemesis; No diarrhea or constipation. No melena or hematochezia.  GENITOURINARY: No dysuria, frequency, hematuria, or incontinence  NEUROLOGICAL: No headaches, memory loss, loss of strength, numbness, or tremors  SKIN: No itching, burning, rashes, or lesions   LYMPH NODES: No enlarged glands  ENDOCRINE: No heat or cold intolerance; No hair loss  MUSCULOSKELETAL: No joint pain or swelling; No muscle, back, or extremity pain  PSYCHIATRIC: No depression, anxiety, mood swings, or difficulty sleeping  HEME/LYMPH: No easy bruising, or bleeding gums  ALLERY AND IMMUNOLOGIC: No hives or eczema    Vital Signs Last 24 Hrs  T(C): 36.2 (2021 05:33), Max: 37.1 (2021 00:26)  T(F): 97.2 (2021 05:33), Max: 98.7 (2021 00:26)  HR: 79 (2021 05:33) (79 - 110)  BP: 113/69 (2021 05:33) (96/51 - 119/73)  BP(mean): --  RR: 19 (2021 09:32) (18 - 19)  SpO2: 93% (2021 09:32) (90% - 94%)    PHYSICAL EXAM:  GENERAL: NAD, well-groomed, well-developed  HEAD:  Atraumatic, Normocephalic  EYES: EOMI, PERRLA, conjunctiva and sclera clear  ENMT: No tonsillar erythema, exudates, or enlargement; Moist mucous membranes, Good dentition, No lesions  NECK: Supple, No JVD, Normal thyroid  NERVOUS SYSTEM:  Alert & Oriented X3, Good concentration; Motor Strength 5/5 B/L upper and lower extremities; DTRs 2+ intact and symmetric  CHEST/LUNG: Clear to percussion bilaterally; No rales, rhonchi, wheezing, or rubs  HEART: Regular rate and rhythm; No murmurs, rubs, or gallops  ABDOMEN: Soft, Nontender, Nondistended; Bowel sounds present  EXTREMITIES:  2+ Peripheral Pulses, No clubbing, cyanosis, or edema  LYMPH: No lymphadenopathy noted  SKIN: No rashes or lesions    LABS:                        11.6   15.52 )-----------( 364      ( 2021 07:35 )             35.0         142  |  106  |  14  ----------------------------<  72  4.5   |  31  |  0.54    Ca    8.3<L>      2021 07:35    TPro  6.0  /  Alb  2.0<L>  /  TBili  0.4  /  DBili  .11  /  AST  31  /  ALT  24  /  AlkPhos  97          Urinalysis Basic - ( 2021 17:35 )    Color: Yellow / Appearance: Clear / S.020 / pH: x  Gluc: x / Ketone: Negative  / Bili: Negative / Urobili: Negative mg/dL   Blood: x / Protein: Negative mg/dL / Nitrite: Positive   Leuk Esterase: Small / RBC: 0-2 /HPF / WBC 11-25   Sq Epi: x / Non Sq Epi: Occasional / Bacteria: Many      CAPILLARY BLOOD GLUCOSE      POCT Blood Glucose.: 87 mg/dL (2021 07:19)  POCT Blood Glucose.: 96 mg/dL (2021 22:07)  POCT Blood Glucose.: 79 mg/dL (2021 15:56)  POCT Blood Glucose.: 147 mg/dL (2021 11:41)                RADIOLOGY & ADDITIONAL TESTS:    Imaging Personally Reviewed:  [ ] YES  [ ] NO    Consultant(s) Notes Reviewed:  [x ] YES  [ ] NO    Care Discussed with Consultants/Other Providers [x ] YES  [ ] NO    Care discussed with family,         [  ]   yes  [  ]  No    imp:    stable[ ]    unstable[  ]     improving [ x  ]       unchanged  [  ]                Plans:  Continue present plans  [x ] urine cuture today, start bactrim for uti.   continue to mnitor Blood sugar and reassess need for insulin               New consult [  ]   specialty  .......               order test[  ]    test name.                  Discharge Planning  [  ]

## 2021-04-29 LAB
ALBUMIN SERPL ELPH-MCNC: 2 G/DL — LOW (ref 3.3–5)
ALP SERPL-CCNC: 83 U/L — SIGNIFICANT CHANGE UP (ref 40–120)
ALT FLD-CCNC: 22 U/L — SIGNIFICANT CHANGE UP (ref 12–78)
ANION GAP SERPL CALC-SCNC: 4 MMOL/L — LOW (ref 5–17)
AST SERPL-CCNC: 23 U/L — SIGNIFICANT CHANGE UP (ref 15–37)
BASOPHILS # BLD AUTO: 0.03 K/UL — SIGNIFICANT CHANGE UP (ref 0–0.2)
BASOPHILS NFR BLD AUTO: 0.2 % — SIGNIFICANT CHANGE UP (ref 0–2)
BILIRUB DIRECT SERPL-MCNC: 0.13 MG/DL — SIGNIFICANT CHANGE UP (ref 0.05–0.2)
BILIRUB INDIRECT FLD-MCNC: 0.3 MG/DL — SIGNIFICANT CHANGE UP (ref 0.2–1)
BILIRUB SERPL-MCNC: 0.4 MG/DL — SIGNIFICANT CHANGE UP (ref 0.2–1.2)
BUN SERPL-MCNC: 12 MG/DL — SIGNIFICANT CHANGE UP (ref 7–23)
CALCIUM SERPL-MCNC: 8.4 MG/DL — LOW (ref 8.5–10.1)
CHLORIDE SERPL-SCNC: 105 MMOL/L — SIGNIFICANT CHANGE UP (ref 96–108)
CO2 SERPL-SCNC: 31 MMOL/L — SIGNIFICANT CHANGE UP (ref 22–31)
CREAT SERPL-MCNC: 0.66 MG/DL — SIGNIFICANT CHANGE UP (ref 0.5–1.3)
CRP SERPL-MCNC: 88 MG/L — HIGH
D DIMER BLD IA.RAPID-MCNC: 688 NG/ML DDU — HIGH
EOSINOPHIL # BLD AUTO: 0.36 K/UL — SIGNIFICANT CHANGE UP (ref 0–0.5)
EOSINOPHIL NFR BLD AUTO: 2.3 % — SIGNIFICANT CHANGE UP (ref 0–6)
ERYTHROCYTE [SEDIMENTATION RATE] IN BLOOD: 79 MM/HR — HIGH (ref 0–20)
FERRITIN SERPL-MCNC: 530 NG/ML — HIGH (ref 15–150)
GLUCOSE BLDC GLUCOMTR-MCNC: 116 MG/DL — HIGH (ref 70–99)
GLUCOSE BLDC GLUCOMTR-MCNC: 145 MG/DL — HIGH (ref 70–99)
GLUCOSE BLDC GLUCOMTR-MCNC: 162 MG/DL — HIGH (ref 70–99)
GLUCOSE BLDC GLUCOMTR-MCNC: 172 MG/DL — HIGH (ref 70–99)
GLUCOSE SERPL-MCNC: 104 MG/DL — HIGH (ref 70–99)
HCT VFR BLD CALC: 33.2 % — LOW (ref 34.5–45)
HGB BLD-MCNC: 11.2 G/DL — LOW (ref 11.5–15.5)
IMM GRANULOCYTES NFR BLD AUTO: 0.7 % — SIGNIFICANT CHANGE UP (ref 0–1.5)
LYMPHOCYTES # BLD AUTO: 1.73 K/UL — SIGNIFICANT CHANGE UP (ref 1–3.3)
LYMPHOCYTES # BLD AUTO: 11.2 % — LOW (ref 13–44)
MCHC RBC-ENTMCNC: 30.3 PG — SIGNIFICANT CHANGE UP (ref 27–34)
MCHC RBC-ENTMCNC: 33.7 GM/DL — SIGNIFICANT CHANGE UP (ref 32–36)
MCV RBC AUTO: 89.7 FL — SIGNIFICANT CHANGE UP (ref 80–100)
MONOCYTES # BLD AUTO: 0.73 K/UL — SIGNIFICANT CHANGE UP (ref 0–0.9)
MONOCYTES NFR BLD AUTO: 4.7 % — SIGNIFICANT CHANGE UP (ref 2–14)
NEUTROPHILS # BLD AUTO: 12.46 K/UL — HIGH (ref 1.8–7.4)
NEUTROPHILS NFR BLD AUTO: 80.9 % — HIGH (ref 43–77)
NRBC # BLD: 0 /100 WBCS — SIGNIFICANT CHANGE UP (ref 0–0)
PLATELET # BLD AUTO: 362 K/UL — SIGNIFICANT CHANGE UP (ref 150–400)
POTASSIUM SERPL-MCNC: 4.4 MMOL/L — SIGNIFICANT CHANGE UP (ref 3.5–5.3)
POTASSIUM SERPL-SCNC: 4.4 MMOL/L — SIGNIFICANT CHANGE UP (ref 3.5–5.3)
PROT SERPL-MCNC: 5.8 GM/DL — LOW (ref 6–8.3)
RAPID RVP RESULT: DETECTED
RBC # BLD: 3.7 M/UL — LOW (ref 3.8–5.2)
RBC # FLD: 13 % — SIGNIFICANT CHANGE UP (ref 10.3–14.5)
SARS-COV-2 RNA SPEC QL NAA+PROBE: DETECTED
SODIUM SERPL-SCNC: 140 MMOL/L — SIGNIFICANT CHANGE UP (ref 135–145)
WBC # BLD: 15.42 K/UL — HIGH (ref 3.8–10.5)
WBC # FLD AUTO: 15.42 K/UL — HIGH (ref 3.8–10.5)

## 2021-04-29 RX ADMIN — ENOXAPARIN SODIUM 60 MILLIGRAM(S): 100 INJECTION SUBCUTANEOUS at 05:32

## 2021-04-29 RX ADMIN — Medication 2: at 11:29

## 2021-04-29 RX ADMIN — Medication 1 TABLET(S): at 17:05

## 2021-04-29 RX ADMIN — METFORMIN HYDROCHLORIDE 1000 MILLIGRAM(S): 850 TABLET ORAL at 17:04

## 2021-04-29 RX ADMIN — Medication 500 MILLIGRAM(S): at 11:30

## 2021-04-29 RX ADMIN — METFORMIN HYDROCHLORIDE 1000 MILLIGRAM(S): 850 TABLET ORAL at 05:32

## 2021-04-29 RX ADMIN — Medication 2: at 17:04

## 2021-04-29 RX ADMIN — Medication 1 TABLET(S): at 05:32

## 2021-04-29 RX ADMIN — ZINC SULFATE TAB 220 MG (50 MG ZINC EQUIVALENT) 220 MILLIGRAM(S): 220 (50 ZN) TAB at 11:30

## 2021-04-29 NOTE — PROGRESS NOTE ADULT - SUBJECTIVE AND OBJECTIVE BOX
Patient is a 70y old  Female who presents with a chief complaint of covid PNA with Hypoxia (2021 18:05)  improving.    on 5l/NC  o2 sat is 955   will titrate down   No fever   Vitals stable   diabetes under control with metformin  denies chest or abd pains. no back pains       INTERVAL HPI/OVERNIGHT EVENTS:  PAST MEDICAL & SURGICAL HISTORY:      MEDICATIONS  (STANDING):  ascorbic acid 500 milliGRAM(s) Oral daily  dextrose 40% Gel 15 Gram(s) Oral once  dextrose 5%. 1000 milliLiter(s) (50 mL/Hr) IV Continuous <Continuous>  dextrose 5%. 1000 milliLiter(s) (100 mL/Hr) IV Continuous <Continuous>  dextrose 50% Injectable 25 Gram(s) IV Push once  dextrose 50% Injectable 12.5 Gram(s) IV Push once  dextrose 50% Injectable 25 Gram(s) IV Push once  enoxaparin Injectable 60 milliGRAM(s) SubCutaneous every 12 hours  glucagon  Injectable 1 milliGRAM(s) IntraMuscular once  insulin lispro (ADMELOG) corrective regimen sliding scale   SubCutaneous three times a day before meals  insulin lispro (ADMELOG) corrective regimen sliding scale   SubCutaneous at bedtime  metFORMIN 1000 milliGRAM(s) Oral every 12 hours  senna 2 Tablet(s) Oral at bedtime  trimethoprim  160 mG/sulfamethoxazole 800 mG 1 Tablet(s) Oral two times a day  zinc sulfate 220 milliGRAM(s) Oral daily    MEDICATIONS  (PRN):  acetaminophen   Tablet .. 650 milliGRAM(s) Oral every 4 hours PRN Temp greater or equal to 38.5C (101.3F)  magnesium hydroxide Suspension 30 milliLiter(s) Oral daily PRN Constipation  melatonin 3 milliGRAM(s) Oral once PRN Insomnia      Allergies    Allergy Status Unknown    Intolerances        REVIEW OF SYSTEMS:  CONSTITUTIONAL: No fever, weight loss, or fatigue  EYES: No eye pain, visual disturbances, or discharge  ENMT:  No difficulty hearing, tinnitus, vertigo; No sinus or throat pain  NECK: No pain or stiffness  BREASTS: No pain, masses, or nipple discharge  RESPIRATORY: No cough, wheezing, chills or hemoptysis; No shortness of breath  CARDIOVASCULAR: No chest pain, palpitations, dizziness, or leg swelling  GASTROINTESTINAL: No abdominal or epigastric pain. No nausea, vomiting, or hematemesis; No diarrhea or constipation. No melena or hematochezia.  GENITOURINARY: No dysuria, frequency, hematuria, or incontinence  NEUROLOGICAL: No headaches, memory loss, loss of strength, numbness, or tremors  SKIN: No itching, burning, rashes, or lesions   LYMPH NODES: No enlarged glands  ENDOCRINE: No heat or cold intolerance; No hair loss  MUSCULOSKELETAL: No joint pain or swelling; No muscle, back, or extremity pain  PSYCHIATRIC: No depression, anxiety, mood swings, or difficulty sleeping  HEME/LYMPH: No easy bruising, or bleeding gums  ALLERY AND IMMUNOLOGIC: No hives or eczema    Vital Signs Last 24 Hrs  T(C): 36.9 (2021 05:49), Max: 37.6 (2021 11:08)  T(F): 98.5 (2021 05:49), Max: 99.6 (2021 11:08)  HR: 85 (2021 05:49) (85 - 96)  BP: 106/53 (2021 05:49) (92/49 - 116/65)  BP(mean): --  RR: 19 (2021 05:49) (18 - 19)  SpO2: 95% (2021 05:49) (89% - 97%)    PHYSICAL EXAM:  GENERAL: NAD, well-groomed, well-developed  HEAD:  Atraumatic, Normocephalic  EYES: EOMI, PERRLA, conjunctiva and sclera clear  ENMT: No tonsillar erythema, exudates, or enlargement; Moist mucous membranes, Good dentition, No lesions  NECK: Supple, No JVD, Normal thyroid  NERVOUS SYSTEM:  Alert & Oriented X3, Good concentration; Motor Strength 5/5 B/L upper and lower extremities; DTRs 2+ intact and symmetric  CHEST/LUNG: Clear to percussion bilaterally;  few rales strill prsent bilaterally R>L. No wheezing  HEART: Regular rate and rhythm; No murmurs, rubs, or gallops  ABDOMEN: Soft, Nontender, Nondistended; Bowel sounds present  EXTREMITIES:  2+ Peripheral Pulses, No clubbing, cyanosis, or edema  LYMPH: No lymphadenopathy noted  SKIN: No rashes or lesions    LABS:                        11.2   15.42 )-----------( 362      ( 2021 08:01 )             33.2     04-    140  |  105  |  12  ----------------------------<  104<H>  4.4   |  31  |  0.66    Ca    8.4<L>      2021 08:01    TPro  5.8<L>  /  Alb  2.0<L>  /  TBili  0.4  /  DBili  .13  /  AST  23  /  ALT  22  /  AlkPhos  83          Urinalysis Basic - ( 2021 17:35 )    Color: Yellow / Appearance: Clear / S.020 / pH: x  Gluc: x / Ketone: Negative  / Bili: Negative / Urobili: Negative mg/dL   Blood: x / Protein: Negative mg/dL / Nitrite: Positive   Leuk Esterase: Small / RBC: 0-2 /HPF / WBC 11-25   Sq Epi: x / Non Sq Epi: Occasional / Bacteria: Many      CAPILLARY BLOOD GLUCOSE      POCT Blood Glucose.: 172 mg/dL (2021 10:36)  POCT Blood Glucose.: 116 mg/dL (2021 07:29)  POCT Blood Glucose.: 195 mg/dL (2021 21:45)  POCT Blood Glucose.: 167 mg/dL (2021 16:49)  POCT Blood Glucose.: 151 mg/dL (2021 11:15)                RADIOLOGY & ADDITIONAL TESTS:    Imaging Personally Reviewed:  [ ] YES  [ ] NO    Consultant(s) Notes Reviewed:  [ ] YES  [ ] NO    Care Discussed with Consultants/Other Providers [ x] YES  [ ] NO    Care discussed with family,         [  ]   yes  [  ]  No    imp:    stable[ ]    unstable[  ]     improving [ x  ]       unchanged  [  ]                Plans:  Continue present plans  [x  ] will titrate down  o2 by N/C.  repeat PCR for covid                     New consult [  ]   specialty  .......               order test[  ]    test name.                  Discharge Planning  [  ]

## 2021-04-29 NOTE — PROGRESS NOTE ADULT - ASSESSMENT
covid PNA with Hypoxia   uti  Diabetes- NIDDM   elevated WBc   d-dimer and  ferritin  trending down     clinically improving

## 2021-04-29 NOTE — PROGRESS NOTE ADULT - SUBJECTIVE AND OBJECTIVE BOX
INTERVAL HPI:  71 yo F with diet controlled DM, who tested positive for COVID 3 days prior to her presentation.  Presented  with complaints of shortness of breath and low oxygen levels noted on pulse oximeter. Patient states her daughter tested positive, unsure which day, and prompted her and her  to get tested. States she has been otherwise asymptomatic until today. Denies cp. No fevers., however has fever her of 101F in the ED.  Non smoker    OVERNIGHT EVENTS:  Comfortable on nasal O2.    Vital Signs Last 24 Hrs  T(C): 36.4 (2021 11:20), Max: 37.2 (2021 00:04)  T(F): 97.6 (2021 11:20), Max: 99 (2021 00:04)  HR: 86 (2021 11:20) (85 - 93)  BP: 129/92 (2021 11:20) (92/49 - 129/92)  BP(mean): --  RR: 18 (2021 12:23) (18 - 19)  SpO2: 95% (2021 12:23) (87% - 97%)    PHYSICAL EXAM:  GEN:         Awake, responsive and comfortable.  HEENT:    Normal.    RESP:       no distress  CVS:             Regular rate and rhythm.   ABD:         Soft, non-tender, non-distended;     MEDICATIONS  (STANDING):  ascorbic acid 500 milliGRAM(s) Oral daily  dextrose 40% Gel 15 Gram(s) Oral once  dextrose 5%. 1000 milliLiter(s) (50 mL/Hr) IV Continuous <Continuous>  dextrose 5%. 1000 milliLiter(s) (100 mL/Hr) IV Continuous <Continuous>  dextrose 50% Injectable 25 Gram(s) IV Push once  dextrose 50% Injectable 12.5 Gram(s) IV Push once  dextrose 50% Injectable 25 Gram(s) IV Push once  enoxaparin Injectable 60 milliGRAM(s) SubCutaneous every 12 hours  glucagon  Injectable 1 milliGRAM(s) IntraMuscular once  insulin lispro (ADMELOG) corrective regimen sliding scale   SubCutaneous three times a day before meals  insulin lispro (ADMELOG) corrective regimen sliding scale   SubCutaneous at bedtime  metFORMIN 1000 milliGRAM(s) Oral every 12 hours  senna 2 Tablet(s) Oral at bedtime  trimethoprim  160 mG/sulfamethoxazole 800 mG 1 Tablet(s) Oral two times a day  zinc sulfate 220 milliGRAM(s) Oral daily    MEDICATIONS  (PRN):  acetaminophen   Tablet .. 650 milliGRAM(s) Oral every 4 hours PRN Temp greater or equal to 38.5C (101.3F)  magnesium hydroxide Suspension 30 milliLiter(s) Oral daily PRN Constipation  melatonin 3 milliGRAM(s) Oral once PRN Insomnia    LABS:                        11.2   15.42 )-----------( 362      ( 2021 08:01 )             33.2         140  |  105  |  12  ----------------------------<  104<H>  4.4   |  31  |  0.66    Ca    8.4<L>      2021 08:01    TPro  5.8<L>  /  Alb  2.0<L>  /  TBili  0.4  /  DBili  .13  /  AST  23  /  ALT  22  /  AlkPhos  83      Urinalysis Basic - ( 2021 17:35 )    Color: Yellow / Appearance: Clear / S.020 / pH: x  Gluc: x / Ketone: Negative  / Bili: Negative / Urobili: Negative mg/dL   Blood: x / Protein: Negative mg/dL / Nitrite: Positive   Leuk Esterase: Small / RBC: 0-2 /HPF / WBC 11-25   Sq Epi: x / Non Sq Epi: Occasional / Bacteria: Many    ASSESSMENT AND PLAN:  ·	Acute hypoxic Respiratory failure.  ·	COVID pneumonia.  ·	Diet controlled DM.  ·	Leukocytosis, on Dexamethasone.  ·	Hypokalemia.    Tolerating nasal O2.  Completed Renmdesivir and Dexamethasone.  On full dose Lovenox, recommend 3 month anticoagulation

## 2021-04-30 LAB
ALBUMIN SERPL ELPH-MCNC: 1.9 G/DL — LOW (ref 3.3–5)
ALP SERPL-CCNC: 75 U/L — SIGNIFICANT CHANGE UP (ref 40–120)
ALT FLD-CCNC: 22 U/L — SIGNIFICANT CHANGE UP (ref 12–78)
AST SERPL-CCNC: 17 U/L — SIGNIFICANT CHANGE UP (ref 15–37)
BILIRUB DIRECT SERPL-MCNC: 0.08 MG/DL — SIGNIFICANT CHANGE UP (ref 0.05–0.2)
BILIRUB INDIRECT FLD-MCNC: 0.3 MG/DL — SIGNIFICANT CHANGE UP (ref 0.2–1)
BILIRUB SERPL-MCNC: 0.4 MG/DL — SIGNIFICANT CHANGE UP (ref 0.2–1.2)
CREAT SERPL-MCNC: 0.69 MG/DL — SIGNIFICANT CHANGE UP (ref 0.5–1.3)
GLUCOSE BLDC GLUCOMTR-MCNC: 120 MG/DL — HIGH (ref 70–99)
GLUCOSE BLDC GLUCOMTR-MCNC: 130 MG/DL — HIGH (ref 70–99)
GLUCOSE BLDC GLUCOMTR-MCNC: 159 MG/DL — HIGH (ref 70–99)
GLUCOSE BLDC GLUCOMTR-MCNC: 241 MG/DL — HIGH (ref 70–99)
HCT VFR BLD CALC: 32.3 % — LOW (ref 34.5–45)
HGB BLD-MCNC: 10.7 G/DL — LOW (ref 11.5–15.5)
MCHC RBC-ENTMCNC: 30.3 PG — SIGNIFICANT CHANGE UP (ref 27–34)
MCHC RBC-ENTMCNC: 33.1 GM/DL — SIGNIFICANT CHANGE UP (ref 32–36)
MCV RBC AUTO: 91.5 FL — SIGNIFICANT CHANGE UP (ref 80–100)
NRBC # BLD: 0 /100 WBCS — SIGNIFICANT CHANGE UP (ref 0–0)
PLATELET # BLD AUTO: 392 K/UL — SIGNIFICANT CHANGE UP (ref 150–400)
PROT SERPL-MCNC: 6.1 GM/DL — SIGNIFICANT CHANGE UP (ref 6–8.3)
RBC # BLD: 3.53 M/UL — LOW (ref 3.8–5.2)
RBC # FLD: 12.9 % — SIGNIFICANT CHANGE UP (ref 10.3–14.5)
WBC # BLD: 16.54 K/UL — HIGH (ref 3.8–10.5)
WBC # FLD AUTO: 16.54 K/UL — HIGH (ref 3.8–10.5)

## 2021-04-30 PROCEDURE — 71045 X-RAY EXAM CHEST 1 VIEW: CPT | Mod: 26

## 2021-04-30 RX ORDER — SODIUM CHLORIDE 9 MG/ML
1000 INJECTION INTRAMUSCULAR; INTRAVENOUS; SUBCUTANEOUS ONCE
Refills: 0 | Status: COMPLETED | OUTPATIENT
Start: 2021-04-30 | End: 2021-04-30

## 2021-04-30 RX ORDER — CEFTRIAXONE 500 MG/1
1000 INJECTION, POWDER, FOR SOLUTION INTRAMUSCULAR; INTRAVENOUS EVERY 24 HOURS
Refills: 0 | Status: DISCONTINUED | OUTPATIENT
Start: 2021-04-30 | End: 2021-05-03

## 2021-04-30 RX ADMIN — METFORMIN HYDROCHLORIDE 1000 MILLIGRAM(S): 850 TABLET ORAL at 17:10

## 2021-04-30 RX ADMIN — Medication 650 MILLIGRAM(S): at 13:34

## 2021-04-30 RX ADMIN — Medication 4: at 11:38

## 2021-04-30 RX ADMIN — Medication 650 MILLIGRAM(S): at 11:38

## 2021-04-30 RX ADMIN — Medication 500 MILLIGRAM(S): at 11:37

## 2021-04-30 RX ADMIN — Medication 1 TABLET(S): at 05:32

## 2021-04-30 RX ADMIN — ENOXAPARIN SODIUM 60 MILLIGRAM(S): 100 INJECTION SUBCUTANEOUS at 05:35

## 2021-04-30 RX ADMIN — METFORMIN HYDROCHLORIDE 1000 MILLIGRAM(S): 850 TABLET ORAL at 05:31

## 2021-04-30 RX ADMIN — CEFTRIAXONE 100 MILLIGRAM(S): 500 INJECTION, POWDER, FOR SOLUTION INTRAMUSCULAR; INTRAVENOUS at 16:33

## 2021-04-30 RX ADMIN — SODIUM CHLORIDE 1000 MILLILITER(S): 9 INJECTION INTRAMUSCULAR; INTRAVENOUS; SUBCUTANEOUS at 16:32

## 2021-04-30 RX ADMIN — ZINC SULFATE TAB 220 MG (50 MG ZINC EQUIVALENT) 220 MILLIGRAM(S): 220 (50 ZN) TAB at 11:38

## 2021-04-30 RX ADMIN — ENOXAPARIN SODIUM 60 MILLIGRAM(S): 100 INJECTION SUBCUTANEOUS at 17:10

## 2021-04-30 NOTE — PROGRESS NOTE ADULT - SUBJECTIVE AND OBJECTIVE BOX
Patient is a 70y old  Female who presents with a chief complaint of covid PNA with Hypoxia (29 Apr 2021 13:15)  blood sugar adequately controlled with Metformin and diet.   Had low grade fever today. no cough, no ent symptoms. no headache, no urinary symptoms. no abd pain. no evidence of dvt.    Urine culture groew E. Coli- patient is on  bactrim       will  do cbc , chest Xray, monitor for 24 hours more.   will need ID clearance for discharge in view of elevated WBC.    INTERVAL HPI/OVERNIGHT EVENTS:  PAST MEDICAL & SURGICAL HISTORY:      MEDICATIONS  (STANDING):  ascorbic acid 500 milliGRAM(s) Oral daily  dextrose 40% Gel 15 Gram(s) Oral once  dextrose 5%. 1000 milliLiter(s) (50 mL/Hr) IV Continuous <Continuous>  dextrose 5%. 1000 milliLiter(s) (100 mL/Hr) IV Continuous <Continuous>  dextrose 50% Injectable 25 Gram(s) IV Push once  dextrose 50% Injectable 12.5 Gram(s) IV Push once  dextrose 50% Injectable 25 Gram(s) IV Push once  enoxaparin Injectable 60 milliGRAM(s) SubCutaneous every 12 hours  glucagon  Injectable 1 milliGRAM(s) IntraMuscular once  insulin lispro (ADMELOG) corrective regimen sliding scale   SubCutaneous three times a day before meals  insulin lispro (ADMELOG) corrective regimen sliding scale   SubCutaneous at bedtime  metFORMIN 1000 milliGRAM(s) Oral every 12 hours  senna 2 Tablet(s) Oral at bedtime  trimethoprim  160 mG/sulfamethoxazole 800 mG 1 Tablet(s) Oral two times a day  zinc sulfate 220 milliGRAM(s) Oral daily    MEDICATIONS  (PRN):  acetaminophen   Tablet .. 650 milliGRAM(s) Oral every 4 hours PRN Temp greater or equal to 38.5C (101.3F)  magnesium hydroxide Suspension 30 milliLiter(s) Oral daily PRN Constipation  melatonin 3 milliGRAM(s) Oral once PRN Insomnia      Allergies    Allergy Status Unknown    Intolerances        REVIEW OF SYSTEMS:  CONSTITUTIONAL: No fever, weight loss, or fatigue  EYES: No eye pain, visual disturbances, or discharge  ENMT:  No difficulty hearing, tinnitus, vertigo; No sinus or throat pain  NECK: No pain or stiffness  BREASTS: No pain, masses, or nipple discharge  RESPIRATORY: No cough, wheezing, chills or hemoptysis; No shortness of breath  CARDIOVASCULAR: No chest pain, palpitations, dizziness, or leg swelling  GASTROINTESTINAL: No abdominal or epigastric pain. No nausea, vomiting, or hematemesis; No diarrhea or constipation. No melena or hematochezia.  GENITOURINARY: No dysuria, frequency, hematuria, or incontinence  NEUROLOGICAL: No headaches, memory loss, loss of strength, numbness, or tremors  SKIN: No itching, burning, rashes, or lesions   LYMPH NODES: No enlarged glands  ENDOCRINE: No heat or cold intolerance; No hair loss  MUSCULOSKELETAL: No joint pain or swelling; No muscle, back, or extremity pain  PSYCHIATRIC: No depression, anxiety, mood swings, or difficulty sleeping  HEME/LYMPH: No easy bruising, or bleeding gums  ALLERY AND IMMUNOLOGIC: No hives or eczema    Vital Signs Last 24 Hrs  T(C): 37.8 (30 Apr 2021 10:37), Max: 37.8 (30 Apr 2021 10:37)  T(F): 100 (30 Apr 2021 10:37), Max: 100 (30 Apr 2021 10:37)  HR: 87 (30 Apr 2021 10:37) (75 - 92)  BP: 91/54 (30 Apr 2021 10:37) (91/54 - 129/92)  BP(mean): --  RR: 181 (30 Apr 2021 10:37) (18 - 181)  SpO2: 96% (30 Apr 2021 10:37) (87% - 96%)    PHYSICAL EXAM:  GENERAL: NAD, well-groomed, well-developed. no distress  HEAD:  Atraumatic, Normocephalic  EYES: EOMI, PERRLA, conjunctiva and sclera clear  ENMT: No tonsillar erythema, exudates, or enlargement; Moist mucous membranes, Good dentition, No lesions  NECK: Supple, No JVD, Normal thyroid  NERVOUS SYSTEM:  Alert & Oriented X3, Good concentration; Motor Strength 5/5 B/L upper and lower extremities; DTRs 2+ intact and symmetric  CHEST/LUNG: Clear to percussion bilaterally; Still has rales in chest  HEART: Regular rate and rhythm; No murmurs, rubs, or gallops  ABDOMEN: Soft, Nontender, Nondistended; Bowel sounds present  EXTREMITIES:  2+ Peripheral Pulses, No clubbing, cyanosis, or edema  LYMPH: No lymphadenopathy noted  SKIN: No rashes or lesions    LABS:                        11.2   15.42 )-----------( 362      ( 29 Apr 2021 08:01 )             33.2     04-30    x   |  x   |  x   ----------------------------<  x   x    |  x   |  0.69    Ca    8.4<L>      29 Apr 2021 08:01    TPro  6.1  /  Alb  1.9<L>  /  TBili  0.4  /  DBili  .08  /  AST  17  /  ALT  22  /  AlkPhos  75  04-30          CAPILLARY BLOOD GLUCOSE      POCT Blood Glucose.: 120 mg/dL (30 Apr 2021 07:54)  POCT Blood Glucose.: 145 mg/dL (29 Apr 2021 21:47)  POCT Blood Glucose.: 162 mg/dL (29 Apr 2021 16:50)                RADIOLOGY & ADDITIONAL TESTS:    Imaging Personally Reviewed:  [ ] YES  [ ] NO    Consultant(s) Notes Reviewed:  [ ] YES  [ ] NO    Care Discussed with Consultants/Other Providers [ ] YES  [ ] NO    Care discussed with family,         [  ]   yes  [  ]  No    imp:    stable[ ]    unstable[  ]     improving [ x  ]       unchanged  [  ]                Plans:  Continue present plans  [ x ] if patient has no fever and  is cleared by ID , possible discharge tomorrow. . home with home PT and o2 / nc 3 litres.               New consult [  ]   specialty  .......               order test[  ]    test name.  cbc, chest xray  today, cbc , labs in am                Discharge Planning  [  ]

## 2021-04-30 NOTE — PROGRESS NOTE ADULT - SUBJECTIVE AND OBJECTIVE BOX
TMAX - 100.3    On day # 3 Bactrim po    Vital Signs Last 24 Hrs  T(C): 36.8 (30 Apr 2021 13:35), Max: 37.9 (30 Apr 2021 10:37)  T(F): 98.2 (30 Apr 2021 13:35), Max: 100.3 (30 Apr 2021 10:37)  HR: 90 (30 Apr 2021 11:34) (75 - 92)  BP: 102/64 (30 Apr 2021 11:34) (91/54 - 113/72)  BP(mean): --  RR: 18 (30 Apr 2021 10:37) (18 - 20)  SpO2: 96% (30 Apr 2021 10:37) (93% - 96%)  Supplemental O2:  on NC O2 now    Awake, alert, feels a little better with decrease cough and tolerating the NC O2 with less SOB unless she moves around and then her O2 Sat's decrease.  Noted with O2 Sat's documented from 87 - 96% on3 Liters NC O2.  No c/o cp or abdominal pain and no further dysuria, but c/o having nausea last PM and having difficulty tolerating the po Bactrim.    PHYSICAL EXAM  General:  71 y/o female awake, alert, sitting semi-upright in bed now,  pleasant and cooperative appears fatigued but more comfortable with NC O2 in place, in NAD  HEENT:  conj pink, sclerae anicteric, PERRLA, no oral lesions noted but mucosa slightly dry  Neck:  supple, no nodes noted  Heart:  RR  Lungs:  improved BS now throughout  Abdomen:  soft, BS+, nontender to palpation, no masses or HS-megaly detected  Back: no CVA or Spinal tenderness elicited to palpation  Extremities:  no edema LE's now                     no calf tenderness noted  Skin:  warm, dry, no rash noted      I&O's Summary :    29 Apr 2021 07:01  -  30 Apr 2021 07:00  --------------------------------------------------------  IN: 300 mL / OUT: 0 mL / NET: 300 mL    30 Apr 2021 07:01  -  30 Apr 2021 15:49  --------------------------------------------------------  IN: 275 mL / OUT: 1 mL / NET: 274 mL      LABS:  CBC Full  -  ( 30 Apr 2021 11:23 )  WBC Count : 16.54 K/uL  RBC Count : 3.53 M/uL  Hemoglobin : 10.7 g/dL  Hematocrit : 32.3 %  Platelet Count - Automated : 392 K/uL  Mean Cell Volume : 91.5 fl  Mean Cell Hemoglobin : 30.3 pg  Mean Cell Hemoglobin Concentration : 33.1 gm/dL  Auto Neutrophil # : x  Auto Lymphocyte # : x  Auto Monocyte # : x  Auto Eosinophil # : x  Auto Basophil # : x  Auto Neutrophil % : x  Auto Lymphocyte % : x  Auto Monocyte % : x  Auto Eosinophil % : x  Auto Basophil % : x    04-30    x   |  x   |  x   ----------------------------<  x   x    |  x   |  0.69    Ca    8.4<L>      29 Apr 2021 08:01    TPro  6.1  /  Alb  1.9<L>  /  TBili  0.4  /  DBili  .08  /  AST  17  /  ALT  22  /  AlkPhos  75  04-30    LIVER FUNCTIONS - ( 30 Apr 2021 07:38 )  Alb: 1.9 g/dL / Pro: 6.1 gm/dL / ALK PHOS: 75 U/L / ALT: 22 U/L / AST: 17 U/L / GGT: x           Sedimentation Rate, Erythrocyte: 79 mm/hr (04-29 @ 08:01)  Sedimentation Rate, Erythrocyte: 40 mm/hr (04-26 @ 08:24)  Sedimentation Rate, Erythrocyte: 48 mm/hr (04-24 @ 09:39)  Sedimentation Rate, Erythrocyte: 52 mm/hr (04-22 @ 08:04)  Sedimentation Rate, Erythrocyte: 25 mm/hr (04-20 @ 07:24)  Sedimentation Rate, Erythrocyte: 97 mm/hr (04-18 @ 07:08)    C-Reactive Protein, Serum (04.29.21 @ 11:01)    C-Reactive Protein, Serum: 88: Please note: Reference range has changed due to units of measure change. mg/L  C-Reactive Protein, Serum (04.28.21 @ 11:40)    C-Reactive Protein, Serum: 91: Please note: Reference range has changed due to units of measure change. mg/L  C-Reactive Protein, Serum (04.26.21 @ 10:38)    C-Reactive Protein, Serum: 68: Please note: Reference range has changed due to units of measure change. mg/L  C-Reactive Protein, Serum (04.23.21 @ 10:04)    C-Reactive Protein, Serum: 78: Please note: Reference range has changed due to units of measure change. mg/L  C-Reactive Protein, Serum (04.22.21 @ 10:51)    C-Reactive Protein, Serum: 46: Please note: Reference range has changed due to units of measure change. mg/L  C-Reactive Protein, Serum (04.18.21 @ 10:47)    C-Reactive Protein, Serum: 226: Please note: Reference range has changed due to units of measure change. mg/L    Procalcitonin, Serum (04.28.21 @ 11:40)    Procalcitonin, Serum: 0.12:   Procalcitonin, Serum (04.22.21 @ 10:51)    Procalcitonin, Serum: 0.06:   Procalcitonin, Serum (04.18.21 @ 10:59)    Procalcitonin, Serum: 0.10:   Procalcitonin, Serum (04.18.21 @ 10:47)    Procalcitonin, Serum: 0.11  Procalcitonin, Serum (04.18.21 @ 01:25)    Procalcitonin, Serum: 0.08:    A1C with Estimated Average Glucose (04.19.21 @ 10:40)    A1C with Estimated Average Glucose Result: 13.5: Method: Immunoassay       Reference Range                4.0-5.6%       High risk (prediabetic)        5.7-6.4%       Diabetic, diagnostic             >=6.5%       ADA diabetic treatment goal       <7.0%    Estimated Average Glucose: 341      Ferritin, Serum in AM (04.29.21 @ 11:03)    Ferritin, Serum: 530 ng/mL  Ferritin, Serum in AM (04.28.21 @ 11:29)    Ferritin, Serum: 584 ng/mL  Ferritin, Serum in AM (04.24.21 @ 13:08)    Ferritin, Serum: 628 ng/mL  Ferritin, Serum in AM (04.23.21 @ 10:02)    Ferritin, Serum: 512 ng/mL  Ferritin, Serum in AM (04.22.21 @ 10:48)    Ferritin, Serum: 585 ng/mL  Ferritin, Serum in AM (04.20.21 @ 10:58)    Ferritin, Serum: 794 ng/mL  Ferritin, Serum (04.18.21 @ 01:26)    Ferritin, Serum: 612 ng/mL    Lactate, Blood (04.17.21 @ 21:55)    Lactate, Blood: 1.2 mmol/L  Lactate, Blood (04.17.21 @ 17:42)    Lactate, Blood: 1.3 mmol/L    Lactate Dehydrogenase, Serum in AM (04.23.21 @ 10:04)    Lactate Dehydrogenase, Serum: 479 U/L  Lactate Dehydrogenase, Serum (04.18.21 @ 10:59)    Lactate Dehydrogenase, Serum: 415 U/L  Lactate Dehydrogenase, Serum (04.18.21 @ 01:25)  Lactate Dehydrogenase, Serum: 479: Mild hemolysis. Results may be falsely elevated. U/L    D-Dimer Assay, Quantitative (04.29.21 @ 08:01)    D-Dimer Assay, Quantitative: 688 ng/mL DDU  D-Dimer Assay, Quantitative (04.28.21 @ 07:35)    D-Dimer Assay, Quantitative: 847 ng/mL DDU  D-Dimer Assay, Quantitative (04.26.21 @ 08:23)    D-Dimer Assay, Quantitative: 3678 ng/mL DDU  D-Dimer Assay, Quantitative (04.24.21 @ 08:11)    D-Dimer Assay, Quantitative: 21581 ng/mL DDU  D-Dimer Assay, Quantitative (04.23.21 @ 08:16)    D-Dimer Assay, Quantitative: 7816 ng/mL DDU  D-Dimer Assay, Quantitative (04.22.21 @ 08:04)    D-Dimer Assay, Quantitative: 6019 ng/mL DDU  D-Dimer Assay, Quantitative (04.19.21 @ 20:49)    D-Dimer Assay, Quantitative: 267 ng/mL DDU  D-Dimer Assay, Quantitative (04.18.21 @ 07:08)    D-Dimer Assay, Quantitative: 248 ng/mL DDU  D-Dimer Assay, Quantitative (04.17.21 @ 17:42)    D-Dimer Assay, Quantitative: 222 ng/mL DDU    Lipase, Serum: 109 U/L (04-28 @ 07:35)  Amylase, Serum Total: 67 U/L (04-28 @ 07:35)        MICROBIOLOGY:  Specimen Source: .Urine Clean Catch (Midstream) (04-29 @ 00:43)  Culture Results:   >100,000 CFU/ml Escherichia coli (04-29 @ 00:43)      Respiratory Viral Panel with COVID-19 by KELLI (04.29.21 @ 12:46)    Rapid RVP Result: Detected    SARS-CoV-2: Detected: This Respiratory Panel uses polymerase chain reaction (PCR) to detect for  adenovirus; coronavirus (HKU1, NL63, 229E, OC43); human metapneumovirus  (hMPV); human enterovirus/rhinovirus (Entero/RV); influenza A; influenza  A/H1; influenza A/H3; influenza A/H1-2009; influenza B; parainfluenza  viruses 1, 2, 3, 4; respiratory syncytial virus; Mycoplasma pneumoniae;  Chlamydophila pneumoniae; and SARS-CoV-2.    COVID-19 Erwin Domain Antibody (04.18.21 @ 10:45)    COVID-19 Erwin Domain Antibody Result: 0.40: Roche ECLIA Total AB (AKILAH)  NOTE: This result index represents a total antibody measurement, which  includes IgG, IgA and IgM.  Measures Receptor Binding Domain of the Erwin Protein  Negative <= 0.79 U/mL  Positive  >= 0.80 U/mL U/mL    COVID-19 Erwin Domain AB Interp: Negative    Flu With COVID-19 By KELLI (04.17.21 @ 18:31)    SARS-CoV-2 Result: Detected: EUA/IVD    Influenza A Result: NotDetec: EUA/IVD    Influenza B Result: NotDetec: EUA/IVD      RADIOLOGY:           < from: Xray Chest 1 View- PORTABLE-Routine (Xray Chest 1 View- PORTABLE-Routine in AM.) (04.26.21 @ 07:28) >  EXAM:  XR CHEST PORTABLE ROUTINE 1V                        PROCEDURE DATE:  04/26/2021    INTERPRETATION:  Portable chest radiograph  CLINICAL INFORMATION: Pneumonia due to Covid 19.  TECHNIQUE:  Portable  AP view of the chest was obtained.  COMPARISON: 4/17/2021 chest available for review.  FINDINGS:  The lungs show stable bilateral  multifocal and diffuse ill-defined airspace opacities.. No pneumothorax.  The  heart is mildly enlarged in transverse diameter. Visualized osseous structures are intact.  IMPRESSION:   No interval change..             < from: CT Angio Chest w/ IV Cont (04.19.21 @ 19:01) >  EXAM:  CT ANGIO CHEST (W)AW IC                        PROCEDURE DATE:  04/19/2021    INTERPRETATION:  CLINICAL INFORMATION: Shortness of breath. COVID positive.  COMPARISON: Chest x-ray of 4/17/2021.  CONTRAST/COMPLICATIONS:  IV Contrast: Omnipaque 350  60 cc administered   40 cc discarded  Oral Contrast: NONE  Complications: None reported at time of study completion  PROCEDURE:  CT Angiography of the Chest.  Sagittal and coronal reformats were performed as well as 3D (MIP) reconstructions.  FINDINGS:  LUNGS AND AIRWAYS: Patent central airways.  The lungs demonstrate bilateral multi focal groundglass opacities most prominent in the midlungs suggestive of atypical pneumonia such as that from COVID 19. Punctate calcified left lower lobe granuloma.  PLEURA: No pleural effusion.  MEDIASTINUM AND JUNIOR: No lymphadenopathy. Punctate left hilar calcification  VESSELS: There is atherosclerotic calcification of the aorta  HEART: Heart size is normal. No pericardial effusion.  CHEST WALL AND LOWER NECK: Within normal limits.  VISUALIZED UPPER ABDOMEN: Within normal limits.  BONES: Degenerative change of the spine  IMPRESSION: No evidence of pulmonary embolism  Bilateral multifocal infiltrates suggestive of pneumonia associated with COVID 19. Lines hilar calcifications and calcified left lower lobe granuloma consistent with old granulomatous disease                     < from: Xray Chest 1 View- PORTABLE-Urgent (04.17.21 @ 17:26) >  EXAM:  XR CHEST PORTABLE URGENT 1V                        PROCEDURE DATE:  04/17/2021    INTERPRETATION:  Chest one view  HISTORY: Shortness of breath, cough and fever  COMPARISON STUDY: None available  Frontal expiratory view of the chest shows the heart to be slightly enlarged in size. The lungs show bilateral patchy pulmonary infiltrates and there is no evidence of pneumothorax nor pleural effusion.  IMPRESSION:  Patchy infiltrates. Differential diagnosis includes Covid-19 pneumonia among other possibilities.        Impression:   71 y/o female with hx of DM reportedly diet- controlled but otherwise in good health, admitted via the ER on 4/17/21 with c/o worsening SOB and cough with associated pleuritic -type cp over the prior few days after testing positive for COVID-19 by PCR a few days PTA and noted by her niece who is an RN to have decreased O2 Sat to 84% at home.  In the ER patient was begun on 3 Liters NC O2 and rx with Remdesivir and Decadron was begun.  Initial CXR revealed bilateral patchy pulmonary infiltrates consistent with dx of COVID-19 Multifocal PNA.   CT Angio of the Chest was done on 4/19/21 and confirmed  Bilateral Multifocal Infiltrates but no evidence of PE, but with evidence of old granulomatous disease involving the LLL. O2 Sat's have remained low and patient now requiring NRB O2.  WBC's are elevated - likely secondary to steroids.  Further w/u with HGB A1C was found to be very elevated consistent with poorly-controlled DM at home.  Now receiving Insulin rx for improved control of her DM.  Noted with WBC's now trending downwards ( Decadron rx has been completed ) and now with D- dimer and Ferritin decreasing as well.  10days of rx with Remdesivir and Decadron completed.  Repeat CXR from 4/26/21 reported with no change in the Multifocal infiltrates but clinically patient appears to be slowly improving now and presently tolerating NCO2.  U/a with micro noted with some pyuria and now Urine Cx with preliminary identification of >100,000 EColi with sensitivities pending.  Patient now with new increased Temp to 100.3 and having some GI intolerance to the po Bactrim along with slightly increased WBC's again and increased ESR.    Suggestions:  Will therefore request Blood Cx's x 2 now and follow-up the Urine Cx results and change ab rx to Rocephin IV pending further  results.  Will request Renal Sono to further evaluate and follow-up repeat CXR results.  Follow-up temps and labs closely.  Monitor O2 Sat's.  Maintain Airborne/ Contact Isolation for COVID-19 Multifocal PNA.  OOB to chair as tolerated.  Discussed in detail with patient at the bedside.

## 2021-04-30 NOTE — PROGRESS NOTE ADULT - SUBJECTIVE AND OBJECTIVE BOX
INTERVAL HPI:  71 yo F with diet controlled DM, who tested positive for COVID 3 days prior to her presentation.  Presented  with complaints of shortness of breath and low oxygen levels noted on pulse oximeter. Patient states her daughter tested positive, unsure which day, and prompted her and her  to get tested. States she has been otherwise asymptomatic until today. Denies cp. No fevers., however has fever her of 101F in the ED.  Non smoker    OVERNIGHT EVENTS:  Low grade fever.    Vital Signs Last 24 Hrs  T(C): 36.8 (30 Apr 2021 16:00), Max: 37.9 (30 Apr 2021 10:37)  T(F): 98.3 (30 Apr 2021 16:00), Max: 100.3 (30 Apr 2021 10:37)  HR: 86 (30 Apr 2021 17:25) (75 - 92)  BP: 102/63 (30 Apr 2021 17:25) (91/54 - 113/72)  BP(mean): --  RR: 18 (30 Apr 2021 16:00) (18 - 20)  SpO2: 94% (30 Apr 2021 16:00) (93% - 96%)    PHYSICAL EXAM:  GEN:        comfortable.  HEENT:    Normal.    RESP:        no distress  CVS:          Regular rate and rhythm.     MEDICATIONS  (STANDING):  ascorbic acid 500 milliGRAM(s) Oral daily  cefTRIAXone   IVPB 1000 milliGRAM(s) IV Intermittent every 24 hours  dextrose 40% Gel 15 Gram(s) Oral once  dextrose 5%. 1000 milliLiter(s) (50 mL/Hr) IV Continuous <Continuous>  dextrose 5%. 1000 milliLiter(s) (100 mL/Hr) IV Continuous <Continuous>  dextrose 50% Injectable 25 Gram(s) IV Push once  dextrose 50% Injectable 12.5 Gram(s) IV Push once  dextrose 50% Injectable 25 Gram(s) IV Push once  enoxaparin Injectable 60 milliGRAM(s) SubCutaneous every 12 hours  glucagon  Injectable 1 milliGRAM(s) IntraMuscular once  insulin lispro (ADMELOG) corrective regimen sliding scale   SubCutaneous three times a day before meals  insulin lispro (ADMELOG) corrective regimen sliding scale   SubCutaneous at bedtime  metFORMIN 1000 milliGRAM(s) Oral every 12 hours  senna 2 Tablet(s) Oral at bedtime  zinc sulfate 220 milliGRAM(s) Oral daily    MEDICATIONS  (PRN):  acetaminophen   Tablet .. 650 milliGRAM(s) Oral every 4 hours PRN Temp greater or equal to 38.5C (101.3F)  magnesium hydroxide Suspension 30 milliLiter(s) Oral daily PRN Constipation  melatonin 3 milliGRAM(s) Oral once PRN Insomnia    LABS:                        10.7   16.54 )-----------( 392      ( 30 Apr 2021 11:23 )             32.3     04-30    x   |  x   |  x   ----------------------------<  x   x    |  x   |  0.69    Ca    8.4<L>      29 Apr 2021 08:01    TPro  6.1  /  Alb  1.9<L>  /  TBili  0.4  /  DBili  .08  /  AST  17  /  ALT  22  /  AlkPhos  75  04-30    ASSESSMENT AND PLAN:  ·	Acute hypoxic Respiratory failure.  ·	COVID pneumonia.  ·	Diet controlled DM.  ·	Leukocytosis, on Dexamethasone.  ·	Hypokalemia.    Tolerating nasal O2.  Completed Renmdesivir and Dexamethasone.  On full dose Lovenox, recommend 3 month anticoagulation .  Started on Rocephin

## 2021-05-01 DIAGNOSIS — D72.829 ELEVATED WHITE BLOOD CELL COUNT, UNSPECIFIED: ICD-10-CM

## 2021-05-01 LAB
-  AMIKACIN: SIGNIFICANT CHANGE UP
-  AMOXICILLIN/CLAVULANIC ACID: SIGNIFICANT CHANGE UP
-  AMPICILLIN/SULBACTAM: SIGNIFICANT CHANGE UP
-  AMPICILLIN: SIGNIFICANT CHANGE UP
-  AZTREONAM: SIGNIFICANT CHANGE UP
-  CEFAZOLIN: SIGNIFICANT CHANGE UP
-  CEFEPIME: SIGNIFICANT CHANGE UP
-  CEFOXITIN: SIGNIFICANT CHANGE UP
-  CEFTRIAXONE: SIGNIFICANT CHANGE UP
-  CIPROFLOXACIN: SIGNIFICANT CHANGE UP
-  ERTAPENEM: SIGNIFICANT CHANGE UP
-  GENTAMICIN: SIGNIFICANT CHANGE UP
-  IMIPENEM: SIGNIFICANT CHANGE UP
-  LEVOFLOXACIN: SIGNIFICANT CHANGE UP
-  MEROPENEM: SIGNIFICANT CHANGE UP
-  NITROFURANTOIN: SIGNIFICANT CHANGE UP
-  PIPERACILLIN/TAZOBACTAM: SIGNIFICANT CHANGE UP
-  TIGECYCLINE: SIGNIFICANT CHANGE UP
-  TOBRAMYCIN: SIGNIFICANT CHANGE UP
-  TRIMETHOPRIM/SULFAMETHOXAZOLE: SIGNIFICANT CHANGE UP
ALBUMIN SERPL ELPH-MCNC: 2 G/DL — LOW (ref 3.3–5)
ALP SERPL-CCNC: 80 U/L — SIGNIFICANT CHANGE UP (ref 40–120)
ALT FLD-CCNC: 21 U/L — SIGNIFICANT CHANGE UP (ref 12–78)
ANION GAP SERPL CALC-SCNC: 3 MMOL/L — LOW (ref 5–17)
AST SERPL-CCNC: 17 U/L — SIGNIFICANT CHANGE UP (ref 15–37)
BASOPHILS # BLD AUTO: 0.05 K/UL — SIGNIFICANT CHANGE UP (ref 0–0.2)
BASOPHILS NFR BLD AUTO: 0.3 % — SIGNIFICANT CHANGE UP (ref 0–2)
BILIRUB SERPL-MCNC: 0.4 MG/DL — SIGNIFICANT CHANGE UP (ref 0.2–1.2)
BUN SERPL-MCNC: 14 MG/DL — SIGNIFICANT CHANGE UP (ref 7–23)
CALCIUM SERPL-MCNC: 8.4 MG/DL — LOW (ref 8.5–10.1)
CHLORIDE SERPL-SCNC: 105 MMOL/L — SIGNIFICANT CHANGE UP (ref 96–108)
CO2 SERPL-SCNC: 30 MMOL/L — SIGNIFICANT CHANGE UP (ref 22–31)
CREAT SERPL-MCNC: 0.7 MG/DL — SIGNIFICANT CHANGE UP (ref 0.5–1.3)
CRP SERPL-MCNC: 75 MG/L — HIGH
CULTURE RESULTS: SIGNIFICANT CHANGE UP
D DIMER BLD IA.RAPID-MCNC: 712 NG/ML DDU — HIGH
EOSINOPHIL # BLD AUTO: 0.29 K/UL — SIGNIFICANT CHANGE UP (ref 0–0.5)
EOSINOPHIL NFR BLD AUTO: 1.9 % — SIGNIFICANT CHANGE UP (ref 0–6)
ERYTHROCYTE [SEDIMENTATION RATE] IN BLOOD: 104 MM/HR — HIGH (ref 0–20)
FERRITIN SERPL-MCNC: 531 NG/ML — HIGH (ref 15–150)
GLUCOSE BLDC GLUCOMTR-MCNC: 149 MG/DL — HIGH (ref 70–99)
GLUCOSE BLDC GLUCOMTR-MCNC: 178 MG/DL — HIGH (ref 70–99)
GLUCOSE BLDC GLUCOMTR-MCNC: 225 MG/DL — HIGH (ref 70–99)
GLUCOSE BLDC GLUCOMTR-MCNC: 249 MG/DL — HIGH (ref 70–99)
GLUCOSE SERPL-MCNC: 142 MG/DL — HIGH (ref 70–99)
HCT VFR BLD CALC: 30 % — LOW (ref 34.5–45)
HGB BLD-MCNC: 10 G/DL — LOW (ref 11.5–15.5)
IMM GRANULOCYTES NFR BLD AUTO: 0.7 % — SIGNIFICANT CHANGE UP (ref 0–1.5)
LYMPHOCYTES # BLD AUTO: 1.57 K/UL — SIGNIFICANT CHANGE UP (ref 1–3.3)
LYMPHOCYTES # BLD AUTO: 10.3 % — LOW (ref 13–44)
MCHC RBC-ENTMCNC: 30.5 PG — SIGNIFICANT CHANGE UP (ref 27–34)
MCHC RBC-ENTMCNC: 33.3 GM/DL — SIGNIFICANT CHANGE UP (ref 32–36)
MCV RBC AUTO: 91.5 FL — SIGNIFICANT CHANGE UP (ref 80–100)
METHOD TYPE: SIGNIFICANT CHANGE UP
MONOCYTES # BLD AUTO: 0.79 K/UL — SIGNIFICANT CHANGE UP (ref 0–0.9)
MONOCYTES NFR BLD AUTO: 5.2 % — SIGNIFICANT CHANGE UP (ref 2–14)
NEUTROPHILS # BLD AUTO: 12.41 K/UL — HIGH (ref 1.8–7.4)
NEUTROPHILS NFR BLD AUTO: 81.6 % — HIGH (ref 43–77)
NRBC # BLD: 0 /100 WBCS — SIGNIFICANT CHANGE UP (ref 0–0)
ORGANISM # SPEC MICROSCOPIC CNT: SIGNIFICANT CHANGE UP
ORGANISM # SPEC MICROSCOPIC CNT: SIGNIFICANT CHANGE UP
PLATELET # BLD AUTO: 394 K/UL — SIGNIFICANT CHANGE UP (ref 150–400)
POTASSIUM SERPL-MCNC: 5.3 MMOL/L — SIGNIFICANT CHANGE UP (ref 3.5–5.3)
POTASSIUM SERPL-SCNC: 5.3 MMOL/L — SIGNIFICANT CHANGE UP (ref 3.5–5.3)
PROT SERPL-MCNC: 6 GM/DL — SIGNIFICANT CHANGE UP (ref 6–8.3)
RBC # BLD: 3.28 M/UL — LOW (ref 3.8–5.2)
RBC # FLD: 13.1 % — SIGNIFICANT CHANGE UP (ref 10.3–14.5)
SODIUM SERPL-SCNC: 138 MMOL/L — SIGNIFICANT CHANGE UP (ref 135–145)
SPECIMEN SOURCE: SIGNIFICANT CHANGE UP
WBC # BLD: 15.22 K/UL — HIGH (ref 3.8–10.5)
WBC # FLD AUTO: 15.22 K/UL — HIGH (ref 3.8–10.5)

## 2021-05-01 RX ADMIN — Medication 500 MILLIGRAM(S): at 11:32

## 2021-05-01 RX ADMIN — ENOXAPARIN SODIUM 60 MILLIGRAM(S): 100 INJECTION SUBCUTANEOUS at 17:13

## 2021-05-01 RX ADMIN — Medication 4: at 16:42

## 2021-05-01 RX ADMIN — Medication 4: at 11:31

## 2021-05-01 RX ADMIN — METFORMIN HYDROCHLORIDE 1000 MILLIGRAM(S): 850 TABLET ORAL at 17:13

## 2021-05-01 RX ADMIN — ENOXAPARIN SODIUM 60 MILLIGRAM(S): 100 INJECTION SUBCUTANEOUS at 05:30

## 2021-05-01 RX ADMIN — CEFTRIAXONE 100 MILLIGRAM(S): 500 INJECTION, POWDER, FOR SOLUTION INTRAMUSCULAR; INTRAVENOUS at 16:41

## 2021-05-01 RX ADMIN — METFORMIN HYDROCHLORIDE 1000 MILLIGRAM(S): 850 TABLET ORAL at 05:30

## 2021-05-01 RX ADMIN — ZINC SULFATE TAB 220 MG (50 MG ZINC EQUIVALENT) 220 MILLIGRAM(S): 220 (50 ZN) TAB at 11:32

## 2021-05-01 NOTE — PROGRESS NOTE ADULT - SUBJECTIVE AND OBJECTIVE BOX
INTERVAL HPI:  69 yo F with diet controlled DM, who tested positive for COVID 3 days prior to her presentation.  Presented  with complaints of shortness of breath and low oxygen levels noted on pulse oximeter. Patient states her daughter tested positive, unsure which day, and prompted her and her  to get tested. States she has been otherwise asymptomatic until today. Denies cp. No fevers., however has fever her of 101F in the ED.  Non smoker    OVERNIGHT EVENTS:  Comfortable.    Vital Signs Last 24 Hrs  T(C): 36.6 (01 May 2021 17:08), Max: 37 (30 Apr 2021 23:57)  T(F): 97.8 (01 May 2021 17:08), Max: 98.6 (30 Apr 2021 23:57)  HR: 103 (01 May 2021 17:08) (92 - 103)  BP: 114/62 (01 May 2021 17:08) (111/66 - 114/62)  BP(mean): --  RR: 18 (01 May 2021 17:08) (16 - 18)  SpO2: 93% (01 May 2021 17:08) (93% - 96%)    PHYSICAL EXAM:  GEN:         Awake, responsive and comfortable.  HEENT:    Normal.    RESP:        no distress  CVS:          Regular rate and rhythm.   ABD:         Soft, non-tender, non-distended;     MEDICATIONS  (STANDING):  ascorbic acid 500 milliGRAM(s) Oral daily  cefTRIAXone   IVPB 1000 milliGRAM(s) IV Intermittent every 24 hours  dextrose 40% Gel 15 Gram(s) Oral once  dextrose 5%. 1000 milliLiter(s) (50 mL/Hr) IV Continuous <Continuous>  dextrose 5%. 1000 milliLiter(s) (100 mL/Hr) IV Continuous <Continuous>  dextrose 50% Injectable 25 Gram(s) IV Push once  dextrose 50% Injectable 12.5 Gram(s) IV Push once  dextrose 50% Injectable 25 Gram(s) IV Push once  enoxaparin Injectable 60 milliGRAM(s) SubCutaneous every 12 hours  glucagon  Injectable 1 milliGRAM(s) IntraMuscular once  insulin lispro (ADMELOG) corrective regimen sliding scale   SubCutaneous three times a day before meals  insulin lispro (ADMELOG) corrective regimen sliding scale   SubCutaneous at bedtime  metFORMIN 1000 milliGRAM(s) Oral every 12 hours  senna 2 Tablet(s) Oral at bedtime  zinc sulfate 220 milliGRAM(s) Oral daily    MEDICATIONS  (PRN):  acetaminophen   Tablet .. 650 milliGRAM(s) Oral every 4 hours PRN Temp greater or equal to 38.5C (101.3F)  magnesium hydroxide Suspension 30 milliLiter(s) Oral daily PRN Constipation  melatonin 3 milliGRAM(s) Oral once PRN Insomnia    LABS:                        10.0   15.22 )-----------( 394      ( 01 May 2021 07:36 )             30.0     05-01    138  |  105  |  14  ----------------------------<  142<H>  5.3   |  30  |  0.70    Ca    8.4<L>      01 May 2021 07:36    TPro  6.0  /  Alb  2.0<L>  /  TBili  0.4  /  DBili  x   /  AST  17  /  ALT  21  /  AlkPhos  80  05-01    ASSESSMENT AND PLAN:  ·	Acute hypoxic Respiratory failure.  ·	COVID pneumonia.  ·	Diet controlled DM.  ·	Leukocytosis, on Dexamethasone.  ·	Hypokalemia.    Tolerating nasal O2.  Completed Renmdesivir and Dexamethasone.  On full dose Lovenox, recommend 3 month anticoagulation .  Started on Rocephin

## 2021-05-01 NOTE — CONSULT NOTE ADULT - SUBJECTIVE AND OBJECTIVE BOX
Reason for Consultation: Leukocytosis    HPI: Patient is a 70y Female seen on consultatioin for the evaluation and management of Leukocytosis    69 y/o female with hx of DM reportedly diet- controlled but otherwise in good health, admitted via the ER on 4/17/21 with c/o worsening SOB and cough with associated pleuritic -type cp over the prior few days after testing positive for COVID-19 by PCR a few days PTA and noted by her niece who is an RN to have decreased O2 Sat to 84% at home.  In the ER patient was begun on 3 Liters NC O2 and rx with Remdesivir and Decadron was begun.  Initial CXR revealed bilateral patchy pulmonary infiltrates consistent with dx of COVID-19 Multifocal PNA.   CT Angio of the Chest was done on 4/19/21 and confirmed  Bilateral Multifocal Infiltrates but no evidence of PE, but with evidence of old granulomatous disease involving the LLL. O2 Sat's have remained low and patient now requiring NRB O2.  WBC's are elevated - likely secondary to steroids.  Further w/u with HGB A1C was found to be very elevated consistent with poorly-controlled DM at home.  Now receiving Insulin rx for improved control of her DM.  Noted with WBC's now trending downwards ( Decadron rx has been completed ) and now with D- dimer and Ferritin decreasing as well.  10days of rx with Remdesivir and Decadron completed.  Repeat CXR from 4/26/21 reported with no change in the Multifocal infiltrates but clinically patient appears to be slowly improving now and presently tolerating NCO2.  U/a with micro noted with some pyuria and now Urine Cx with preliminary identification of >100,000 EColi with sensitivities pending.  Patient now with new increased Temp to 100.3 and having some GI intolerance to the po Bactrim along with slightly increased WBC's again and increased ESR.        PAST MEDICAL & SURGICAL HISTORY:      MEDICATIONS  (STANDING):  ascorbic acid 500 milliGRAM(s) Oral daily  cefTRIAXone   IVPB 1000 milliGRAM(s) IV Intermittent every 24 hours  dextrose 40% Gel 15 Gram(s) Oral once  dextrose 5%. 1000 milliLiter(s) (50 mL/Hr) IV Continuous <Continuous>  dextrose 5%. 1000 milliLiter(s) (100 mL/Hr) IV Continuous <Continuous>  dextrose 50% Injectable 25 Gram(s) IV Push once  dextrose 50% Injectable 12.5 Gram(s) IV Push once  dextrose 50% Injectable 25 Gram(s) IV Push once  enoxaparin Injectable 60 milliGRAM(s) SubCutaneous every 12 hours  glucagon  Injectable 1 milliGRAM(s) IntraMuscular once  insulin lispro (ADMELOG) corrective regimen sliding scale   SubCutaneous three times a day before meals  insulin lispro (ADMELOG) corrective regimen sliding scale   SubCutaneous at bedtime  metFORMIN 1000 milliGRAM(s) Oral every 12 hours  senna 2 Tablet(s) Oral at bedtime  zinc sulfate 220 milliGRAM(s) Oral daily    MEDICATIONS  (PRN):  acetaminophen   Tablet .. 650 milliGRAM(s) Oral every 4 hours PRN Temp greater or equal to 38.5C (101.3F)  magnesium hydroxide Suspension 30 milliLiter(s) Oral daily PRN Constipation  melatonin 3 milliGRAM(s) Oral once PRN Insomnia      Allergies    Allergy Status Unknown    Intolerances        SOCIAL HISTORY:    Smoking Status: no  Alcohol: no      Vital Signs Last 24 Hrs  T(C): 36.7 (01 May 2021 10:22), Max: 37 (30 Apr 2021 23:57)  T(F): 98.1 (01 May 2021 10:22), Max: 98.6 (30 Apr 2021 23:57)  HR: 97 (01 May 2021 10:22) (83 - 97)  BP: 112/50 (01 May 2021 10:22) (92/55 - 114/56)  BP(mean): --  RR: 18 (01 May 2021 10:22) (16 - 18)  SpO2: 94% (01 May 2021 10:22) (93% - 96%)    PHYSICAL EXAM:    general - AAO x 3  HEENT - No Icterus  CVS - RRR  RS - AE B/L  Abd - soft, NT  Ext - Pulses +        LABS:                        10.0   15.22 )-----------( 394      ( 01 May 2021 07:36 )             30.0     05-01    138  |  105  |  14  ----------------------------<  142<H>  5.3   |  30  |  0.70    Ca    8.4<L>      01 May 2021 07:36    TPro  6.0  /  Alb  2.0<L>  /  TBili  0.4  /  DBili  x   /  AST  17  /  ALT  21  /  AlkPhos  80  05-01          Culture - Urine (collected 29 Apr 2021 00:43)  Source: .Urine Clean Catch (Midstream)  Final Report (01 May 2021 08:37):    >100,000 CFU/ml Escherichia coli  Organism: Escherichia coli (01 May 2021 08:37)  Organism: Escherichia coli (01 May 2021 08:37)        RADIOLOGY & ADDITIONAL STUDIES:

## 2021-05-01 NOTE — PROGRESS NOTE ADULT - SUBJECTIVE AND OBJECTIVE BOX
TMAX - 100.3    On day # 2 Rocephin /  s/p Bactrim po    Vital Signs Last 24 Hrs  T(C): 36.6 (01 May 2021 17:08), Max: 37 (30 Apr 2021 23:57)  T(F): 97.8 (01 May 2021 17:08), Max: 98.6 (30 Apr 2021 23:57)  HR: 103 (01 May 2021 17:08) (92 - 103)  BP: 114/62 (01 May 2021 17:08) (111/66 - 114/62)  BP(mean): --  RR: 18 (01 May 2021 17:08) (16 - 18)  SpO2: 93% (01 May 2021 17:08) (93% - 96%)  Supplemental O2:  on 3 Liters NC O2    Awake, alert, feeling a little better but still with SOB with any exertion.  Intermittent dry cough continues but no c/o cp or abdominal pain and no further dysuria.  Appetite is improving.  No c/o chills.    PHYSICAL EXAM  General:  71 y/o female awake, alert, sitting up on the edge of the bed now,  pleasant and cooperative appears fatigued but more comfortable with NC O2 in place, in NAD  HEENT:  conj pink, sclerae anicteric, PERRLA, no oral lesions noted but mucosa slightly dry  Neck:  supple, no nodes noted  Heart:  RR  Lungs:  improved BS now throughout  Abdomen:  soft, BS+, nontender to palpation, no masses or HS-megaly detected  Back: no CVA or Spinal tenderness elicited to palpation  Extremities:  no edema LE's now                     no calf tenderness noted  Skin:  warm, dry, no rash noted      I&O's Summary :    30 Apr 2021 07:01  -  01 May 2021 07:00  --------------------------------------------------------  IN: 1135 mL / OUT: 0 mL / NET: 1135 mL    01 May 2021 07:01  -  01 May 2021 18:16  --------------------------------------------------------  IN: 236 mL / OUT: 0 mL / NET: 236 mL      LABS:  CBC Full  -  ( 01 May 2021 07:36 )  WBC Count : 15.22 K/uL  RBC Count : 3.28 M/uL  Hemoglobin : 10.0 g/dL  Hematocrit : 30.0 %  Platelet Count - Automated : 394 K/uL  Mean Cell Volume : 91.5 fl  Mean Cell Hemoglobin : 30.5 pg  Mean Cell Hemoglobin Concentration : 33.3 gm/dL  Auto Neutrophil # : 12.41 K/uL  Auto Lymphocyte # : 1.57 K/uL  Auto Monocyte # : 0.79 K/uL  Auto Eosinophil # : 0.29 K/uL  Auto Basophil # : 0.05 K/uL  Auto Neutrophil % : 81.6 %  Auto Lymphocyte % : 10.3 %  Auto Monocyte % : 5.2 %  Auto Eosinophil % : 1.9 %  Auto Basophil % : 0.3 %    05-01    138  |  105  |  14  ----------------------------<  142<H>  5.3   |  30  |  0.70    Ca    8.4<L>      01 May 2021 07:36    TPro  6.0  /  Alb  2.0<L>  /  TBili  0.4  /  DBili  x   /  AST  17  /  ALT  21  /  AlkPhos  80  05-01    LIVER FUNCTIONS - ( 01 May 2021 07:36 )  Alb: 2.0 g/dL / Pro: 6.0 gm/dL / ALK PHOS: 80 U/L / ALT: 21 U/L / AST: 17 U/L / GGT: x           Sedimentation Rate, Erythrocyte: 104 mm/hr (05-01 @ 07:36)  Sedimentation Rate, Erythrocyte: 79 mm/hr (04-29 @ 08:01)  Sedimentation Rate, Erythrocyte: 40 mm/hr (04-26 @ 08:24)  Sedimentation Rate, Erythrocyte: 48 mm/hr (04-24 @ 09:39)  Sedimentation Rate, Erythrocyte: 52 mm/hr (04-22 @ 08:04)  Sedimentation Rate, Erythrocyte: 25 mm/hr (04-20 @ 07:24)  Sedimentation Rate, Erythrocyte: 97 mm/hr (04-18 @ 07:08)    C-Reactive Protein, Serum (05.01.21 @ 11:58)    C-Reactive Protein, Serum: 75: Please note: Reference range has changed due to units of measure change. mg/L  C-Reactive Protein, Serum (04.29.21 @ 11:01)    C-Reactive Protein, Serum: 88: Please note: Reference range has changed due to units of measure change. mg/L  C-Reactive Protein, Serum (04.28.21 @ 11:40)    C-Reactive Protein, Serum: 91: Please note: Reference range has changed due to units of measure change. mg/L  C-Reactive Protein, Serum (04.26.21 @ 10:38)    C-Reactive Protein, Serum: 68: Please note: Reference range has changed due to units of measure change. mg/L  C-Reactive Protein, Serum (04.23.21 @ 10:04)    C-Reactive Protein, Serum: 78: Please note: Reference range has changed due to units of measure change. mg/L  C-Reactive Protein, Serum (04.22.21 @ 10:51)    C-Reactive Protein, Serum: 46: Please note: Reference range has changed due to units of measure change. mg/L  C-Reactive Protein, Serum (04.18.21 @ 10:47)    C-Reactive Protein, Serum: 226: Please note: Reference range has changed due to units of measure change. mg/L    Procalcitonin, Serum (04.28.21 @ 11:40)    Procalcitonin, Serum: 0.12:   Procalcitonin, Serum (04.22.21 @ 10:51)    Procalcitonin, Serum: 0.06:   Procalcitonin, Serum (04.18.21 @ 10:59)    Procalcitonin, Serum: 0.10:   Procalcitonin, Serum (04.18.21 @ 10:47)    Procalcitonin, Serum: 0.11  Procalcitonin, Serum (04.18.21 @ 01:25)    Procalcitonin, Serum: 0.08:    A1C with Estimated Average Glucose (04.19.21 @ 10:40)    A1C with Estimated Average Glucose Result: 13.5: Method: Immunoassay       Reference Range                4.0-5.6%       High risk (prediabetic)        5.7-6.4%       Diabetic, diagnostic             >=6.5%       ADA diabetic treatment goal       <7.0%    Estimated Average Glucose: 341    Ferritin, Serum in AM (05.01.21 @ 11:57)    Ferritin, Serum: 531 ng/mL  Ferritin, Serum in AM (04.29.21 @ 11:03)    Ferritin, Serum: 530 ng/mL  Ferritin, Serum in AM (04.28.21 @ 11:29)    Ferritin, Serum: 584 ng/mL  Ferritin, Serum in AM (04.24.21 @ 13:08)    Ferritin, Serum: 628 ng/mL  Ferritin, Serum in AM (04.23.21 @ 10:02)    Ferritin, Serum: 512 ng/mL  Ferritin, Serum in AM (04.22.21 @ 10:48)    Ferritin, Serum: 585 ng/mL  Ferritin, Serum in AM (04.20.21 @ 10:58)    Ferritin, Serum: 794 ng/mL  Ferritin, Serum (04.18.21 @ 01:26)    Ferritin, Serum: 612 ng/mL    Lactate, Blood (04.17.21 @ 21:55)    Lactate, Blood: 1.2 mmol/L  Lactate, Blood (04.17.21 @ 17:42)    Lactate, Blood: 1.3 mmol/L    Lactate Dehydrogenase, Serum in AM (04.23.21 @ 10:04)    Lactate Dehydrogenase, Serum: 479 U/L  Lactate Dehydrogenase, Serum (04.18.21 @ 10:59)    Lactate Dehydrogenase, Serum: 415 U/L  Lactate Dehydrogenase, Serum (04.18.21 @ 01:25)  Lactate Dehydrogenase, Serum: 479: Mild hemolysis. Results may be falsely elevated. U/L    D-Dimer Assay, Quantitative (05.01.21 @ 07:36)    D-Dimer Assay, Quantitative: 712 ng/mL DDU  D-Dimer Assay, Quantitative (04.29.21 @ 08:01)    D-Dimer Assay, Quantitative: 688 ng/mL DDU  D-Dimer Assay, Quantitative (04.28.21 @ 07:35)    D-Dimer Assay, Quantitative: 847 ng/mL DDU  D-Dimer Assay, Quantitative (04.26.21 @ 08:23)    D-Dimer Assay, Quantitative: 3678 ng/mL DDU  D-Dimer Assay, Quantitative (04.24.21 @ 08:11)    D-Dimer Assay, Quantitative: 73346 ng/mL DDU  D-Dimer Assay, Quantitative (04.23.21 @ 08:16)    D-Dimer Assay, Quantitative: 7816 ng/mL DDU  D-Dimer Assay, Quantitative (04.22.21 @ 08:04)    D-Dimer Assay, Quantitative: 6019 ng/mL DDU  D-Dimer Assay, Quantitative (04.19.21 @ 20:49)    D-Dimer Assay, Quantitative: 267 ng/mL DDU  D-Dimer Assay, Quantitative (04.18.21 @ 07:08)    D-Dimer Assay, Quantitative: 248 ng/mL DDU  D-Dimer Assay, Quantitative (04.17.21 @ 17:42)    D-Dimer Assay, Quantitative: 222 ng/mL DDU    Lipase, Serum: 109 U/L (04-28 @ 07:35)  Amylase, Serum Total: 67 U/L (04-28 @ 07:35)      MICROBIOLOGY:  Specimen Source: .Urine Clean Catch (Midstream) (04-29 @ 00:43)  Culture Results:   >100,000 CFU/ml Escherichia coli (04-29 @ 00:43)  Culture - Urine (04.29.21 @ 00:43)    -  Amikacin: S <=16    -  Amoxicillin/Clavulanic Acid: S <=8/4    -  Ampicillin: S <=8 These ampicillin results predict results for amoxicillin    -  Ampicillin/Sulbactam: S <=4/2 Enterobacter, Citrobacter, and Serratia may develop resistance during prolonged therapy (3-4 days)    -  Aztreonam: S <=4    -  Cefazolin: S <=2 ([id number]) For uncomplicated UTI with K. pneumoniae, E. coli, or P. mirablis: GEORGE <=16 is sensitive and GEORGE >=32 is resistant. This also predicts results for oral agents cefaclor, cefdinir, cefpodoxime, cefprozil, cefuroxime axetil, cephalexin and locarbef for uncomplicated UTI. Note that some isolates may be susceptible to these agents while testing resistant to cefazolin.    -  Cefepime: S <=2    -  Cefoxitin: S <=8    -  Ceftriaxone: S <=1 Enterobacter, Citrobacter, and Serratia may develop resistance during prolonged therapy    -  Ciprofloxacin: S <=0.25    -  Ertapenem: S <=0.5    -  Gentamicin: S <=2    -  Imipenem: S <=1    -  Levofloxacin: S <=0.5    -  Meropenem: S <=1    -  Nitrofurantoin: S <=32 Should not be used to treat pyelonephritis    -  Piperacillin/Tazobactam: S <=8    -  Tigecycline: S <=2    -  Tobramycin: S <=2    -  Trimethoprim/Sulfamethoxazole: S <=0.5/9.5    Specimen Source: .Urine Clean Catch (Midstream)    Culture Results:   >100,000 CFU/ml Escherichia coli    Organism Identification: Escherichia coli    Organism: Escherichia coli    Method Type: [facility]     Respiratory Viral Panel with COVID-19 by KELLI (04.29.21 @ 12:46)    Rapid RVP Result: Detected    SARS-CoV-2: Detected: This Respiratory Panel uses polymerase chain reaction (PCR) to detect for  adenovirus; coronavirus (HKU1, NL63, 229E, OC43); human metapneumovirus  (hMPV); human enterovirus/rhinovirus (Entero/RV); influenza A; influenza  A/H1; influenza A/H3; influenza A/H1-2009; influenza B; parainfluenza  viruses 1, 2, 3, 4; respiratory syncytial virus; Mycoplasma pneumoniae;  Chlamydophila pneumoniae; and SARS-CoV-2.       COVID-19 Erwin Domain Antibody (04.18.21 @ 10:45)    COVID-19 Erwin Domain Antibody Result: 0.40: Roche ECLIA Total AB (AKILAH)  NOTE: This result index represents a total antibody measurement, which  includes IgG, IgA and IgM.  Measures Receptor Binding Domain of the Erwin Protein  Negative <= 0.79 U/mL  Positive  >= 0.80 U/mL U/mL    COVID-19 Erwin Domain AB Interp: Negative    Flu With COVID-19 By KELLI (04.17.21 @ 18:31)    SARS-CoV-2 Result: Detected: EUA/IVD    Influenza A Result: NotDetec: EUA/IVD    Influenza B Result: NotDetec: EUA/IVD      RADIOLOGY:                 < from: Xray Chest 1 View- PORTABLE-Urgent (Xray Chest 1 View- PORTABLE-Urgent .) (04.30.21 @ 11:50) >  EXAM:  XR CHEST PORTABLE URGENT 1V                        PROCEDURE DATE:  04/30/2021    INTERPRETATION:  Portable chest radiograph  CLINICAL INFORMATION: Pneumonia due to Covid 19.  TECHNIQUE:  Portable  AP view of the chest was obtained.  COMPARISON: 4/26/2021 chest available for review.  FINDINGS:  The lungs show bilateral  multifocal and diffuse ill-defined airspace opacities with improved aeration of RIGHT lower lobe... No pneumothorax.  The heart and mediastinum are within normal limits.  Visualized osseous structures are intact.  IMPRESSION:   Multifocal and diffuse ill-defined airspace opacities with improved aeration of RIGHT lower lobe..                   < from: Xray Chest 1 View- PORTABLE-Routine (Xray Chest 1 View- PORTABLE-Routine in AM.) (04.26.21 @ 07:28) >  EXAM:  XR CHEST PORTABLE ROUTINE 1V                        PROCEDURE DATE:  04/26/2021    INTERPRETATION:  Portable chest radiograph  CLINICAL INFORMATION: Pneumonia due to Covid 19.  TECHNIQUE:  Portable  AP view of the chest was obtained.  COMPARISON: 4/17/2021 chest available for review.  FINDINGS:  The lungs show stable bilateral  multifocal and diffuse ill-defined airspace opacities.. No pneumothorax.  The  heart is mildly enlarged in transverse diameter. Visualized osseous structures are intact.  IMPRESSION:   No interval change..             < from: CT Angio Chest w/ IV Cont (04.19.21 @ 19:01) >  EXAM:  CT ANGIO CHEST (W)AW IC                        PROCEDURE DATE:  04/19/2021    INTERPRETATION:  CLINICAL INFORMATION: Shortness of breath. COVID positive.  COMPARISON: Chest x-ray of 4/17/2021.  CONTRAST/COMPLICATIONS:  IV Contrast: Omnipaque 350  60 cc administered   40 cc discarded  Oral Contrast: NONE  Complications: None reported at time of study completion  PROCEDURE:  CT Angiography of the Chest.  Sagittal and coronal reformats were performed as well as 3D (MIP) reconstructions.  FINDINGS:  LUNGS AND AIRWAYS: Patent central airways.  The lungs demonstrate bilateral multi focal groundglass opacities most prominent in the midlungs suggestive of atypical pneumonia such as that from COVID 19. Punctate calcified left lower lobe granuloma.  PLEURA: No pleural effusion.  MEDIASTINUM AND JUNIOR: No lymphadenopathy. Punctate left hilar calcification  VESSELS: There is atherosclerotic calcification of the aorta  HEART: Heart size is normal. No pericardial effusion.  CHEST WALL AND LOWER NECK: Within normal limits.  VISUALIZED UPPER ABDOMEN: Within normal limits.  BONES: Degenerative change of the spine  IMPRESSION: No evidence of pulmonary embolism  Bilateral multifocal infiltrates suggestive of pneumonia associated with COVID 19. Lines hilar calcifications and calcified left lower lobe granuloma consistent with old granulomatous disease                     < from: Xray Chest 1 View- PORTABLE-Urgent (04.17.21 @ 17:26) >  EXAM:  XR CHEST PORTABLE URGENT 1V                        PROCEDURE DATE:  04/17/2021    INTERPRETATION:  Chest one view  HISTORY: Shortness of breath, cough and fever  COMPARISON STUDY: None available  Frontal expiratory view of the chest shows the heart to be slightly enlarged in size. The lungs show bilateral patchy pulmonary infiltrates and there is no evidence of pneumothorax nor pleural effusion.  IMPRESSION:  Patchy infiltrates. Differential diagnosis includes Covid-19 pneumonia among other possibilities.        Impression:   71 y/o female with hx of DM reportedly diet- controlled but otherwise in good health, admitted via the ER on 4/17/21 with c/o worsening SOB and cough with associated pleuritic -type cp over the prior few days after testing positive for COVID-19 by PCR a few days PTA and noted by her niece who is an RN to have decreased O2 Sat to 84% at home.  In the ER patient was begun on 3 Liters NC O2 and rx with Remdesivir and Decadron was begun.  Initial CXR revealed bilateral patchy pulmonary infiltrates consistent with dx of COVID-19 Multifocal PNA.   CT Angio of the Chest was done on 4/19/21 and confirmed  Bilateral Multifocal Infiltrates but no evidence of PE, but with evidence of old granulomatous disease involving the LLL. O2 Sat's have remained low and patient now requiring NRB O2.  WBC's are elevated - likely secondary to steroids.  Further w/u with HGB A1C was found to be very elevated consistent with poorly-controlled DM at home.  Now receiving Insulin rx for improved control of her DM.  Noted with WBC's now trending downwards ( Decadron rx has been completed ) and now with D- dimer and Ferritin decreasing as well.  10days of rx with Remdesivir and Decadron completed.  Repeat CXR from 4/26/21 reported with no change in the Multifocal infiltrates but clinically patient appears to be slowly improving now and presently tolerating NCO2.  U/a with micro noted with some pyuria and now Urine Cx finalized with >100,000 EColi which is sensitive to the Rocephin.   Patient with new increased Temp to 100.3 on 4/30/21 and repeat Blood Cx's obtained and pending now.  Still with increased WBC's again and increased ESR. Noted D-dimer slightly increased again but  Ferritin is unchanged.  Noted repeat CXR from 4/30/21 with continued Multifocal diffuse airspace opacities but with some improvement in aeration of the RLL.    Suggestions:  Will therefore continue current ab rx with Rocephin for the Sepsis with EColi UTI now and follow-up[ Blood Cx's.  Await Renal Sono to further evaluate.  Follow-up temps and labs closely.  Monitor O2 Sat's.  Maintain Airborne/ Contact Isolation for COVID-19 Multifocal PNA.  OOB to chair as tolerated.  Discussed in detail with patient at the bedside.

## 2021-05-01 NOTE — PROGRESS NOTE ADULT - SUBJECTIVE AND OBJECTIVE BOX
Patient is a 70y old  Female who presents with a chief complaint of covid PNA with Hypoxia (30 Apr 2021 17:43)  afebrile.   mild dry cough. not dyspnoec on 3 l /Nc    no abd pain    on IV rocephin, still has elevated wbc count.    blood cultures - report pending   renal sono pending    INTERVAL HPI/OVERNIGHT EVENTS:  PAST MEDICAL & SURGICAL HISTORY:      MEDICATIONS  (STANDING):  ascorbic acid 500 milliGRAM(s) Oral daily  cefTRIAXone   IVPB 1000 milliGRAM(s) IV Intermittent every 24 hours  dextrose 40% Gel 15 Gram(s) Oral once  dextrose 5%. 1000 milliLiter(s) (50 mL/Hr) IV Continuous <Continuous>  dextrose 5%. 1000 milliLiter(s) (100 mL/Hr) IV Continuous <Continuous>  dextrose 50% Injectable 25 Gram(s) IV Push once  dextrose 50% Injectable 12.5 Gram(s) IV Push once  dextrose 50% Injectable 25 Gram(s) IV Push once  enoxaparin Injectable 60 milliGRAM(s) SubCutaneous every 12 hours  glucagon  Injectable 1 milliGRAM(s) IntraMuscular once  insulin lispro (ADMELOG) corrective regimen sliding scale   SubCutaneous three times a day before meals  insulin lispro (ADMELOG) corrective regimen sliding scale   SubCutaneous at bedtime  metFORMIN 1000 milliGRAM(s) Oral every 12 hours  senna 2 Tablet(s) Oral at bedtime  zinc sulfate 220 milliGRAM(s) Oral daily    MEDICATIONS  (PRN):  acetaminophen   Tablet .. 650 milliGRAM(s) Oral every 4 hours PRN Temp greater or equal to 38.5C (101.3F)  magnesium hydroxide Suspension 30 milliLiter(s) Oral daily PRN Constipation  melatonin 3 milliGRAM(s) Oral once PRN Insomnia      Allergies    Allergy Status Unknown    Intolerances        REVIEW OF SYSTEMS:  CONSTITUTIONAL: No fever, weight loss, or fatigue  EYES: No eye pain, visual disturbances, or discharge  ENMT:  No difficulty hearing, tinnitus, vertigo; No sinus or throat pain  NECK: No pain or stiffness  BREASTS: No pain, masses, or nipple discharge  RESPIRATORY: No cough, wheezing, chills or hemoptysis; No shortness of breath  CARDIOVASCULAR: No chest pain, palpitations, dizziness, or leg swelling  GASTROINTESTINAL: No abdominal or epigastric pain. No nausea, vomiting, or hematemesis; No diarrhea or constipation. No melena or hematochezia.  GENITOURINARY: No dysuria, frequency, hematuria, or incontinence  NEUROLOGICAL: No headaches, memory loss, loss of strength, numbness, or tremors  SKIN: No itching, burning, rashes, or lesions   LYMPH NODES: No enlarged glands  ENDOCRINE: No heat or cold intolerance; No hair loss  MUSCULOSKELETAL: No joint pain or swelling; No muscle, back, or extremity pain  PSYCHIATRIC: No depression, anxiety, mood swings, or difficulty sleeping  HEME/LYMPH: No easy bruising, or bleeding gums  ALLERY AND IMMUNOLOGIC: No hives or eczema    Vital Signs Last 24 Hrs  T(C): 36.7 (01 May 2021 10:22), Max: 37.9 (30 Apr 2021 10:37)  T(F): 98.1 (01 May 2021 10:22), Max: 100.3 (30 Apr 2021 10:37)  HR: 97 (01 May 2021 10:22) (83 - 97)  BP: 112/50 (01 May 2021 10:22) (91/54 - 114/56)  BP(mean): --  RR: 18 (01 May 2021 10:22) (16 - 18)  SpO2: 94% (01 May 2021 10:22) (93% - 96%)    PHYSICAL EXAM:  GENERAL: NAD, well-groomed, well-developed  HEAD:  Atraumatic, Normocephalic  EYES: EOMI, PERRLA, conjunctiva and sclera clear  ENMT: No tonsillar erythema, exudates, or enlargement; Moist mucous membranes, Good dentition, No lesions  NECK: Supple, No JVD, Normal thyroid  NERVOUS SYSTEM:  Alert & Oriented X3, Good concentration; Motor Strength 5/5 B/L upper and lower extremities; DTRs 2+ intact and symmetric  CHEST/LUNG: Clear to percussion bilaterally;  few rales psoteriorly  HEART: Regular rate and rhythm; No murmurs, rubs, or gallops  ABDOMEN: Soft, Nontender, Nondistended; Bowel sounds present  EXTREMITIES:  2+ Peripheral Pulses, No clubbing, cyanosis, or edema  LYMPH: No lymphadenopathy noted  SKIN: No rashes or lesions    LABS:                        10.0   15.22 )-----------( 394      ( 01 May 2021 07:36 )             30.0     05-01    138  |  105  |  14  ----------------------------<  142<H>  5.3   |  30  |  0.70    Ca    8.4<L>      01 May 2021 07:36    TPro  6.0  /  Alb  2.0<L>  /  TBili  0.4  /  DBili  x   /  AST  17  /  ALT  21  /  AlkPhos  80  05-01          CAPILLARY BLOOD GLUCOSE      POCT Blood Glucose.: 149 mg/dL (01 May 2021 07:31)  POCT Blood Glucose.: 159 mg/dL (30 Apr 2021 20:57)  POCT Blood Glucose.: 130 mg/dL (30 Apr 2021 16:29)  POCT Blood Glucose.: 241 mg/dL (30 Apr 2021 11:29)                RADIOLOGY & ADDITIONAL TESTS:    Imaging Personally Reviewed:  [ ] YES  [ ] NO    Consultant(s) Notes Reviewed:  [ ] YES  [ ] NO    Care Discussed with Consultants/Other Providers [ ] YES  [ ] NO    Care discussed with family,         [  ]   yes  [  ]  No    imp:    stable[ x]    unstable[  ]     improving [x   ]       unchanged  [  ]                Plans:  Continue present plans  [x  ]               New consult [  ]   specialty  .......               order test[  ]    test name. cbc in am                 Discharge Planning  [  ]

## 2021-05-01 NOTE — CONSULT NOTE ADULT - PROBLEM SELECTOR RECOMMENDATION 9
- on admission WBC count was normal  - patient with COVID pneumonia s/p Remdesivir and Decadron  - elevated ESR, possibly secondary to recent pneumonia or decadron use  - Leukocytosis predominantly Neutrophils, no immature cells seen  - continue to watch WBC count

## 2021-05-02 LAB
APPEARANCE UR: CLEAR — SIGNIFICANT CHANGE UP
BACTERIA # UR AUTO: ABNORMAL
BILIRUB UR-MCNC: NEGATIVE — SIGNIFICANT CHANGE UP
COLOR SPEC: YELLOW — SIGNIFICANT CHANGE UP
DIFF PNL FLD: ABNORMAL
EPI CELLS # UR: ABNORMAL
GLUCOSE BLDC GLUCOMTR-MCNC: 102 MG/DL — HIGH (ref 70–99)
GLUCOSE BLDC GLUCOMTR-MCNC: 163 MG/DL — HIGH (ref 70–99)
GLUCOSE BLDC GLUCOMTR-MCNC: 206 MG/DL — HIGH (ref 70–99)
GLUCOSE BLDC GLUCOMTR-MCNC: 238 MG/DL — HIGH (ref 70–99)
GLUCOSE UR QL: 50 MG/DL
KETONES UR-MCNC: NEGATIVE — SIGNIFICANT CHANGE UP
LEUKOCYTE ESTERASE UR-ACNC: ABNORMAL
NITRITE UR-MCNC: NEGATIVE — SIGNIFICANT CHANGE UP
PH UR: 5 — SIGNIFICANT CHANGE UP (ref 5–8)
PROT UR-MCNC: NEGATIVE MG/DL — SIGNIFICANT CHANGE UP
RBC CASTS # UR COMP ASSIST: SIGNIFICANT CHANGE UP /HPF (ref 0–4)
SP GR SPEC: 1.01 — SIGNIFICANT CHANGE UP (ref 1.01–1.02)
UROBILINOGEN FLD QL: NEGATIVE MG/DL — SIGNIFICANT CHANGE UP
WBC UR QL: ABNORMAL

## 2021-05-02 PROCEDURE — 76775 US EXAM ABDO BACK WALL LIM: CPT | Mod: 26

## 2021-05-02 RX ADMIN — ENOXAPARIN SODIUM 60 MILLIGRAM(S): 100 INJECTION SUBCUTANEOUS at 06:14

## 2021-05-02 RX ADMIN — CEFTRIAXONE 100 MILLIGRAM(S): 500 INJECTION, POWDER, FOR SOLUTION INTRAMUSCULAR; INTRAVENOUS at 16:00

## 2021-05-02 RX ADMIN — ZINC SULFATE TAB 220 MG (50 MG ZINC EQUIVALENT) 220 MILLIGRAM(S): 220 (50 ZN) TAB at 11:16

## 2021-05-02 RX ADMIN — METFORMIN HYDROCHLORIDE 1000 MILLIGRAM(S): 850 TABLET ORAL at 06:14

## 2021-05-02 RX ADMIN — Medication 500 MILLIGRAM(S): at 11:15

## 2021-05-02 RX ADMIN — ENOXAPARIN SODIUM 60 MILLIGRAM(S): 100 INJECTION SUBCUTANEOUS at 17:03

## 2021-05-02 RX ADMIN — METFORMIN HYDROCHLORIDE 1000 MILLIGRAM(S): 850 TABLET ORAL at 17:03

## 2021-05-02 RX ADMIN — Medication 4: at 08:06

## 2021-05-02 RX ADMIN — Medication 4: at 17:02

## 2021-05-02 NOTE — PROGRESS NOTE ADULT - SUBJECTIVE AND OBJECTIVE BOX
Patient is a 70y old  Female who presents with a chief complaint of covid PNA with Hypoxia (01 May 2021 18:15)  comforatble on 3l/nc   mainatining o2 sat above 94.  no fever   no complaints. Vitals stable   will repeat UA today, cbc in am   blood cultures negative.   Hematology notes appreciated    INTERVAL HPI/OVERNIGHT EVENTS:  PAST MEDICAL & SURGICAL HISTORY:      MEDICATIONS  (STANDING):  ascorbic acid 500 milliGRAM(s) Oral daily  cefTRIAXone   IVPB 1000 milliGRAM(s) IV Intermittent every 24 hours  dextrose 40% Gel 15 Gram(s) Oral once  dextrose 5%. 1000 milliLiter(s) (50 mL/Hr) IV Continuous <Continuous>  dextrose 5%. 1000 milliLiter(s) (100 mL/Hr) IV Continuous <Continuous>  dextrose 50% Injectable 25 Gram(s) IV Push once  dextrose 50% Injectable 12.5 Gram(s) IV Push once  dextrose 50% Injectable 25 Gram(s) IV Push once  enoxaparin Injectable 60 milliGRAM(s) SubCutaneous every 12 hours  glucagon  Injectable 1 milliGRAM(s) IntraMuscular once  insulin lispro (ADMELOG) corrective regimen sliding scale   SubCutaneous three times a day before meals  insulin lispro (ADMELOG) corrective regimen sliding scale   SubCutaneous at bedtime  metFORMIN 1000 milliGRAM(s) Oral every 12 hours  senna 2 Tablet(s) Oral at bedtime  zinc sulfate 220 milliGRAM(s) Oral daily    MEDICATIONS  (PRN):  acetaminophen   Tablet .. 650 milliGRAM(s) Oral every 4 hours PRN Temp greater or equal to 38.5C (101.3F)  magnesium hydroxide Suspension 30 milliLiter(s) Oral daily PRN Constipation  melatonin 3 milliGRAM(s) Oral once PRN Insomnia      Allergies    Allergy Status Unknown    Intolerances        REVIEW OF SYSTEMS:  CONSTITUTIONAL: No fever, weight loss, or fatigue  EYES: No eye pain, visual disturbances, or discharge  ENMT:  No difficulty hearing, tinnitus, vertigo; No sinus or throat pain  NECK: No pain or stiffness  BREASTS: No pain, masses, or nipple discharge  RESPIRATORY: No cough, wheezing, chills or hemoptysis; No shortness of breath  CARDIOVASCULAR: No chest pain, palpitations, dizziness, or leg swelling  GASTROINTESTINAL: No abdominal or epigastric pain. No nausea, vomiting, or hematemesis; No diarrhea or constipation. No melena or hematochezia.  GENITOURINARY: No dysuria, frequency, hematuria, or incontinence  NEUROLOGICAL: No headaches, memory loss, loss of strength, numbness, or tremors  SKIN: No itching, burning, rashes, or lesions   LYMPH NODES: No enlarged glands  ENDOCRINE: No heat or cold intolerance; No hair loss  MUSCULOSKELETAL: No joint pain or swelling; No muscle, back, or extremity pain  PSYCHIATRIC: No depression, anxiety, mood swings, or difficulty sleeping  HEME/LYMPH: No easy bruising, or bleeding gums  ALLERY AND IMMUNOLOGIC: No hives or eczema    Vital Signs Last 24 Hrs  T(C): 36.8 (02 May 2021 05:36), Max: 36.8 (01 May 2021 23:48)  T(F): 98.3 (02 May 2021 05:36), Max: 98.3 (02 May 2021 05:36)  HR: 81 (02 May 2021 07:27) (79 - 111)  BP: 125/65 (02 May 2021 05:36) (100/58 - 125/65)  BP(mean): --  RR: 18 (02 May 2021 05:36) (18 - 18)  SpO2: 98% (02 May 2021 07:27) (93% - 100%)    PHYSICAL EXAM:  GENERAL: NAD, well-groomed, well-developed  HEAD:  Atraumatic, Normocephalic  EYES: EOMI, PERRLA, conjunctiva and sclera clear  ENMT: No tonsillar erythema, exudates, or enlargement; Moist mucous membranes, Good dentition, No lesions  NECK: Supple, No JVD, Normal thyroid  NERVOUS SYSTEM:  Alert & Oriented X3, Good concentration; Motor Strength 5/5 B/L upper and lower extremities; DTRs 2+ intact and symmetric  CHEST/LUNG: Clear to percussion bilaterally; few rales posteriorly  HEART: Regular rate and rhythm; No murmurs, rubs, or gallops  ABDOMEN: Soft, Nontender, Nondistended; Bowel sounds present  EXTREMITIES:  2+ Peripheral Pulses, No clubbing, cyanosis, or edema  LYMPH: No lymphadenopathy noted  SKIN: No rashes or lesions    LABS:                        10.0   15.22 )-----------( 394      ( 01 May 2021 07:36 )             30.0     05-01    138  |  105  |  14  ----------------------------<  142<H>  5.3   |  30  |  0.70    Ca    8.4<L>      01 May 2021 07:36    TPro  6.0  /  Alb  2.0<L>  /  TBili  0.4  /  DBili  x   /  AST  17  /  ALT  21  /  AlkPhos  80  05-01          CAPILLARY BLOOD GLUCOSE      POCT Blood Glucose.: 238 mg/dL (02 May 2021 07:42)  POCT Blood Glucose.: 178 mg/dL (01 May 2021 21:17)  POCT Blood Glucose.: 225 mg/dL (01 May 2021 16:26)  POCT Blood Glucose.: 249 mg/dL (01 May 2021 10:42)                RADIOLOGY & ADDITIONAL TESTS:    Imaging Personally Reviewed:  [ ] YES  [ ] NO    Consultant(s) Notes Reviewed:  [ ] YES  [ ] NO    Care Discussed with Consultants/Other Providers [ ] YES  [ ] NO    Care discussed with family,         [  ]   yes  [  ]  No    imp:    stable[ ]    unstable[  ]     improving [ x  ]       unchanged  [  ]                Plans:  Continue present plans  [ x ] will taper o2 to 2l/NC               New consult [  ]   specialty  .......               order test[  ]    test name.  cbc, urinalysis                Discharge Planning  [  ]

## 2021-05-02 NOTE — PROGRESS NOTE ADULT - ASSESSMENT
uti   leukocytosis   s/p covid PNA    with Hypoxia, needing supplemental o2   diabetes- can be controlled with  diet and oral hypoglycemic agents.

## 2021-05-02 NOTE — PROGRESS NOTE ADULT - SUBJECTIVE AND OBJECTIVE BOX
INTERVAL HPI:  69 yo F with diet controlled DM, who tested positive for COVID 3 days prior to her presentation.  Presented  with complaints of shortness of breath and low oxygen levels noted on pulse oximeter. Patient states her daughter tested positive, unsure which day, and prompted her and her  to get tested. States she has been otherwise asymptomatic until today. Denies cp. No fevers., however has fever her of 101F in the ED.  Non smoker    OVERNIGHT EVENTS:  Comfortable on nasal O2.    Vital Signs Last 24 Hrs  T(C): 37.2 (02 May 2021 15:34), Max: 37.2 (02 May 2021 15:34)  T(F): 99 (02 May 2021 15:34), Max: 99 (02 May 2021 15:34)  HR: 86 (02 May 2021 15:34) (79 - 111)  BP: 108/53 (02 May 2021 15:34) (100/58 - 125/65)  BP(mean): --  RR: 18 (02 May 2021 15:34) (18 - 18)  SpO2: 95% (02 May 2021 15:34) (95% - 100%)    PHYSICAL EXAM:  GEN:         Awake, responsive and comfortable.  HEENT:    Normal.    RESP:        no distress  CVS:             Regular rate and rhythm.   ABD:         Soft, non-tender, non-distended;     MEDICATIONS  (STANDING):  ascorbic acid 500 milliGRAM(s) Oral daily  cefTRIAXone   IVPB 1000 milliGRAM(s) IV Intermittent every 24 hours  dextrose 40% Gel 15 Gram(s) Oral once  dextrose 5%. 1000 milliLiter(s) (50 mL/Hr) IV Continuous <Continuous>  dextrose 5%. 1000 milliLiter(s) (100 mL/Hr) IV Continuous <Continuous>  dextrose 50% Injectable 25 Gram(s) IV Push once  dextrose 50% Injectable 12.5 Gram(s) IV Push once  dextrose 50% Injectable 25 Gram(s) IV Push once  enoxaparin Injectable 60 milliGRAM(s) SubCutaneous every 12 hours  glucagon  Injectable 1 milliGRAM(s) IntraMuscular once  insulin lispro (ADMELOG) corrective regimen sliding scale   SubCutaneous three times a day before meals  insulin lispro (ADMELOG) corrective regimen sliding scale   SubCutaneous at bedtime  metFORMIN 1000 milliGRAM(s) Oral every 12 hours  senna 2 Tablet(s) Oral at bedtime  zinc sulfate 220 milliGRAM(s) Oral daily    MEDICATIONS  (PRN):  acetaminophen   Tablet .. 650 milliGRAM(s) Oral every 4 hours PRN Temp greater or equal to 38.5C (101.3F)  magnesium hydroxide Suspension 30 milliLiter(s) Oral daily PRN Constipation  melatonin 3 milliGRAM(s) Oral once PRN Insomnia    LABS:                        10.0   15.22 )-----------( 394      ( 01 May 2021 07:36 )             30.0     05    138  |  105  |  14  ----------------------------<  142<H>  5.3   |  30  |  0.70    Ca    8.4<L>      01 May 2021 07:36    TPro  6.0  /  Alb  2.0<L>  /  TBili  0.4  /  DBili  x   /  AST  17  /  ALT  21  /  AlkPhos  80  05-    Urinalysis Basic - ( 02 May 2021 10:26 )    Color: Yellow / Appearance: Clear / S.015 / pH: x  Gluc: x / Ketone: Negative  / Bili: Negative / Urobili: Negative mg/dL   Blood: x / Protein: Negative mg/dL / Nitrite: Negative   Leuk Esterase: Moderate / RBC: 0-2 /HPF / WBC 11-25   Sq Epi: x / Non Sq Epi: Moderate / Bacteria: Few    ASSESSMENT AND PLAN:  ·	Acute hypoxic Respiratory failure.  ·	COVID pneumonia.  ·	Diet controlled DM.  ·	Leukocytosis, on Dexamethasone.  ·	Hypokalemia.    SPO2 stable on nasal O2.  Completed Renmdesivir and Dexamethasone.  On full dose Lovenox, recommend 3 month anticoagulation, D Dimer tomorrow.  Started on Rocephin

## 2021-05-03 ENCOUNTER — FORM ENCOUNTER (OUTPATIENT)
Age: 71
End: 2021-05-03

## 2021-05-03 ENCOUNTER — TRANSCRIPTION ENCOUNTER (OUTPATIENT)
Age: 71
End: 2021-05-03

## 2021-05-03 VITALS
DIASTOLIC BLOOD PRESSURE: 67 MMHG | OXYGEN SATURATION: 94 % | RESPIRATION RATE: 18 BRPM | HEART RATE: 94 BPM | SYSTOLIC BLOOD PRESSURE: 104 MMHG | TEMPERATURE: 98 F

## 2021-05-03 LAB
ANION GAP SERPL CALC-SCNC: 5 MMOL/L — SIGNIFICANT CHANGE UP (ref 5–17)
BASOPHILS # BLD AUTO: 0.08 K/UL — SIGNIFICANT CHANGE UP (ref 0–0.2)
BASOPHILS NFR BLD AUTO: 0.7 % — SIGNIFICANT CHANGE UP (ref 0–2)
BUN SERPL-MCNC: 12 MG/DL — SIGNIFICANT CHANGE UP (ref 7–23)
CALCIUM SERPL-MCNC: 8.7 MG/DL — SIGNIFICANT CHANGE UP (ref 8.5–10.1)
CHLORIDE SERPL-SCNC: 101 MMOL/L — SIGNIFICANT CHANGE UP (ref 96–108)
CO2 SERPL-SCNC: 30 MMOL/L — SIGNIFICANT CHANGE UP (ref 22–31)
CREAT SERPL-MCNC: 0.51 MG/DL — SIGNIFICANT CHANGE UP (ref 0.5–1.3)
CRP SERPL-MCNC: 42 MG/L — HIGH
D DIMER BLD IA.RAPID-MCNC: 296 NG/ML DDU — HIGH
EOSINOPHIL # BLD AUTO: 0.19 K/UL — SIGNIFICANT CHANGE UP (ref 0–0.5)
EOSINOPHIL NFR BLD AUTO: 1.7 % — SIGNIFICANT CHANGE UP (ref 0–6)
ERYTHROCYTE [SEDIMENTATION RATE] IN BLOOD: 106 MM/HR — HIGH (ref 0–20)
FERRITIN SERPL-MCNC: 506 NG/ML — HIGH (ref 15–150)
GLUCOSE BLDC GLUCOMTR-MCNC: 113 MG/DL — HIGH (ref 70–99)
GLUCOSE BLDC GLUCOMTR-MCNC: 200 MG/DL — HIGH (ref 70–99)
GLUCOSE BLDC GLUCOMTR-MCNC: 202 MG/DL — HIGH (ref 70–99)
GLUCOSE SERPL-MCNC: 174 MG/DL — HIGH (ref 70–99)
HCT VFR BLD CALC: 31.8 % — LOW (ref 34.5–45)
HGB BLD-MCNC: 10.7 G/DL — LOW (ref 11.5–15.5)
IMM GRANULOCYTES NFR BLD AUTO: 0.5 % — SIGNIFICANT CHANGE UP (ref 0–1.5)
LYMPHOCYTES # BLD AUTO: 1.59 K/UL — SIGNIFICANT CHANGE UP (ref 1–3.3)
LYMPHOCYTES # BLD AUTO: 14.5 % — SIGNIFICANT CHANGE UP (ref 13–44)
MCHC RBC-ENTMCNC: 30.1 PG — SIGNIFICANT CHANGE UP (ref 27–34)
MCHC RBC-ENTMCNC: 33.6 GM/DL — SIGNIFICANT CHANGE UP (ref 32–36)
MCV RBC AUTO: 89.6 FL — SIGNIFICANT CHANGE UP (ref 80–100)
MONOCYTES # BLD AUTO: 0.7 K/UL — SIGNIFICANT CHANGE UP (ref 0–0.9)
MONOCYTES NFR BLD AUTO: 6.4 % — SIGNIFICANT CHANGE UP (ref 2–14)
NEUTROPHILS # BLD AUTO: 8.37 K/UL — HIGH (ref 1.8–7.4)
NEUTROPHILS NFR BLD AUTO: 76.2 % — SIGNIFICANT CHANGE UP (ref 43–77)
NRBC # BLD: 0 /100 WBCS — SIGNIFICANT CHANGE UP (ref 0–0)
PLATELET # BLD AUTO: 399 K/UL — SIGNIFICANT CHANGE UP (ref 150–400)
POTASSIUM SERPL-MCNC: 3.9 MMOL/L — SIGNIFICANT CHANGE UP (ref 3.5–5.3)
POTASSIUM SERPL-SCNC: 3.9 MMOL/L — SIGNIFICANT CHANGE UP (ref 3.5–5.3)
RBC # BLD: 3.55 M/UL — LOW (ref 3.8–5.2)
RBC # FLD: 12.7 % — SIGNIFICANT CHANGE UP (ref 10.3–14.5)
SODIUM SERPL-SCNC: 136 MMOL/L — SIGNIFICANT CHANGE UP (ref 135–145)
WBC # BLD: 10.99 K/UL — HIGH (ref 3.8–10.5)
WBC # FLD AUTO: 10.99 K/UL — HIGH (ref 3.8–10.5)

## 2021-05-03 RX ORDER — APIXABAN 2.5 MG/1
1 TABLET, FILM COATED ORAL
Qty: 60 | Refills: 0
Start: 2021-05-03 | End: 2021-06-01

## 2021-05-03 RX ORDER — METFORMIN HYDROCHLORIDE 850 MG/1
1 TABLET ORAL
Qty: 60 | Refills: 0
Start: 2021-05-03 | End: 2021-06-01

## 2021-05-03 RX ORDER — SITAGLIPTIN 50 MG/1
1 TABLET, FILM COATED ORAL
Qty: 30 | Refills: 0
Start: 2021-05-03 | End: 2021-06-01

## 2021-05-03 RX ADMIN — CEFTRIAXONE 100 MILLIGRAM(S): 500 INJECTION, POWDER, FOR SOLUTION INTRAMUSCULAR; INTRAVENOUS at 15:52

## 2021-05-03 RX ADMIN — METFORMIN HYDROCHLORIDE 1000 MILLIGRAM(S): 850 TABLET ORAL at 17:19

## 2021-05-03 RX ADMIN — ENOXAPARIN SODIUM 60 MILLIGRAM(S): 100 INJECTION SUBCUTANEOUS at 17:19

## 2021-05-03 RX ADMIN — Medication 2: at 15:53

## 2021-05-03 RX ADMIN — ENOXAPARIN SODIUM 60 MILLIGRAM(S): 100 INJECTION SUBCUTANEOUS at 05:22

## 2021-05-03 RX ADMIN — METFORMIN HYDROCHLORIDE 1000 MILLIGRAM(S): 850 TABLET ORAL at 07:49

## 2021-05-03 RX ADMIN — Medication 500 MILLIGRAM(S): at 11:57

## 2021-05-03 RX ADMIN — Medication 4: at 07:49

## 2021-05-03 RX ADMIN — ZINC SULFATE TAB 220 MG (50 MG ZINC EQUIVALENT) 220 MILLIGRAM(S): 220 (50 ZN) TAB at 11:57

## 2021-05-03 NOTE — DISCHARGE NOTE PROVIDER - NSDCMRMEDTOKEN_GEN_ALL_CORE_FT
Eliquis 5 mg oral tablet: 1 tab(s) orally 2 times a day   Januvia 50 mg oral tablet: 1 tab(s) orally once a day   metFORMIN 1000 mg oral tablet: 1 tab(s) orally every 12 hours   cefadroxil 500 mg oral capsule: 1 cap(s) orally 2 times a day   Eliquis 5 mg oral tablet: 1 tab(s) orally 2 times a day   Januvia 50 mg oral tablet: 1 tab(s) orally once a day   metFORMIN 1000 mg oral tablet: 1 tab(s) orally every 12 hours

## 2021-05-03 NOTE — CHART NOTE - NSCHARTNOTEFT_GEN_A_CORE
Medicine Hospitalist PA    Pt is a 70 YOF with PMHx of DM p/w sob found to have COVID PNA. Admission prolonged due to titration off HFNC. Was initially titrated to nasal cannula over the weekend. But still requires 2L. Patient with O2 sat 94% at rest. Pt with O2 sat 87% with ambulation. Therefore pt requires home o2.
Nutrition follow-up note-    Pt adm w/COVID pneumonia w/hypoxia. No PMHx documented. Pt w/good appetite and PO intake, consumes >75% meals. No N/V/D/C or difficulty chewing/swallowing reported. Pt's HbA1c= 13.5% (4/19/21), diet education provided previously to pt. Pt verbalizes comprehension to same, did not present w/any questions/concerns. Encouraged compliance to CHO portion control w/meals and snacks. Encouraged pt to maintain good PO intake. Pt made aware RD remains available.     Factors impacting intake: [x ] none [ ] nausea  [ ] vomiting [ ] diarrhea [ ] constipation  [ ]chewing problems [ ] swallowing issues  [ ] other:     Diet Prescription: Diet, DASH/TLC:   Sodium & Cholesterol Restricted  Consistent Carbohydrate {Evening Snack} (04-27-21 @ 10:16)    Intake: mostly 100% meals.    Current Weight: (4/27) 60.9 kg, (4/25) 57.5 kg, (4/23) 60.3 kg, (4/18 adm) 57.5 kg.  % Weight Change: 5.9% gain (3.4 kg) x 10 days/since admission; noted ongoing fluctuations in daily weights, questionable accuracy of daily weights.    Physical appearance: no edema documented.  Nutrition focused physical exam conducted:  Subcutaneous fat loss: [WDL ] Orbital fat pads region, [WDL ]Buccal fat region, [WDL ]Triceps region,  [WDL ]Ribs region.  Muscle wasting: [mild ]Temples region, [WDL ]Clavicle region, [WDL ]Shoulder region, [WDL ]Scapula region, [WDL ]Interosseous region,  [unable ]thigh region, [WDL ]Calf region.    Pertinent Medications: MEDICATIONS  (STANDING):  ascorbic acid 500 milliGRAM(s) Oral daily  dextrose 40% Gel 15 Gram(s) Oral once  dextrose 5%. 1000 milliLiter(s) (50 mL/Hr) IV Continuous <Continuous>  dextrose 5%. 1000 milliLiter(s) (100 mL/Hr) IV Continuous <Continuous>  dextrose 50% Injectable 25 Gram(s) IV Push once  dextrose 50% Injectable 12.5 Gram(s) IV Push once  dextrose 50% Injectable 25 Gram(s) IV Push once  enoxaparin Injectable 60 milliGRAM(s) SubCutaneous every 12 hours  glucagon  Injectable 1 milliGRAM(s) IntraMuscular once  insulin glargine Injectable (LANTUS) 6 Unit(s) SubCutaneous at bedtime  insulin lispro (ADMELOG) corrective regimen sliding scale   SubCutaneous three times a day before meals  insulin lispro (ADMELOG) corrective regimen sliding scale   SubCutaneous at bedtime  insulin lispro Injectable (ADMELOG) 8 Unit(s) SubCutaneous three times a day before meals  metFORMIN 1000 milliGRAM(s) Oral every 12 hours  senna 2 Tablet(s) Oral at bedtime  zinc sulfate 220 milliGRAM(s) Oral daily    MEDICATIONS  (PRN):  acetaminophen   Tablet .. 650 milliGRAM(s) Oral every 4 hours PRN Temp greater or equal to 38.5C (101.3F)  magnesium hydroxide Suspension 30 milliLiter(s) Oral daily PRN Constipation  melatonin 3 milliGRAM(s) Oral once PRN Insomnia    Pertinent Labs: 04-27 Na--    Glu --    K+ --    Cr  0.50 mg/dL BUN --    04-27 Alb 1.9 g/dL<L>    04-19-21 @ 10:40A1C 13.5  04-18-21 @ 10:45A1C 13.0    POCT Glucose (4/27) 204, 45, (4/26) 106, 315, 235, 148.    Skin: no pressure ulcers.    Estimated Needs:   [x ] no change since previous assessment (4/20)  [ ] recalculated:     Previous Nutrition Diagnosis:   Other Inconsistent Carbohydrate intake.     Etiology physiological causes (T2DM) requiring careful timing & consistency of CHO; food & nutrition-related knowledge deficit.     Signs/Symptoms hyperglycemia, HbA1c= 13.5%, pt verbalizes incomplete knowledge of therapeutic diet.     Goal/Expected Outcome Pt will verbalize need for diet/lifestyle change to attain improved glycemic control; HbA1c <8%.    Nutrition Diagnosis is [x ] ongoing  [ ] resolved [ ] not applicable     New Nutrition Diagnosis: [x ] not applicable      Interventions:   Recommend:  [x] Continue current diet as Rx'd.  [ ] Change Diet To:  [ ] Nutrition Supplement  [ ] Nutrition Support  [x ] Other: Pt will benefit from endocrinology consult.     Monitoring and Evaluation:   [x ] PO intake [ x ] Tolerance to diet prescription [ x ] weights [ x ] labs[ x ] follow up per protocol  [ ] other:
ROIDS-Dose Study Enrollment Note    IRB     Study Subject    04-19-21 @ 17:43    Aris Sierra (investigator) discussed with KYLEE FORD eligibility for the Randomized Open Investigation Determining Steroid Dose (ROIDS-Dose) Trial. KYLEE RLRENEE meets all inclusion and none of the exclusion criteria for this research study.     Aris Sierra explained the risks and benefits of enrolling in the study as well as the study design. Any and all questions asked by patient were addressed and answered.      Patient signed the consent form and Aris Sierra signed the consent form as the consenting physician.      A copy of the consent form was placed into the chart and a copy was given to the patient.      Pharmacy was contacted and study drug was ordered.  The primary team was notified.    Aris Sierra MD  Study Investigator  Pulmonary / Critical Care    If you have any questions or concerns, please feel to contact Dr. Hyacinth Leroy at [phone].

## 2021-05-03 NOTE — PROGRESS NOTE ADULT - TIME-BASED BILLING (NON-CRITICAL CARE)
Time-based billing (NON-critical care)

## 2021-05-03 NOTE — DISCHARGE NOTE NURSING/CASE MANAGEMENT/SOCIAL WORK - PATIENT PORTAL LINK FT
You can access the FollowMyHealth Patient Portal offered by [facility] by registering at the following website: [email or web address]. By joining [facility]’s FollowMyHealth portal, you will also be able to view your health information using other applications (apps) compatible with our system.

## 2021-05-03 NOTE — PROGRESS NOTE ADULT - SUBJECTIVE AND OBJECTIVE BOX
Patient is a 70y old  Female who presents with a chief complaint of covid PNA with Hypoxia (02 May 2021 17:45)  improved. . on 2l/nc   no distress   UTI- wbc trending down   stable   no complaints    INTERVAL HPI/OVERNIGHT EVENTS:  PAST MEDICAL & SURGICAL HISTORY:      MEDICATIONS  (STANDING):  ascorbic acid 500 milliGRAM(s) Oral daily  cefTRIAXone   IVPB 1000 milliGRAM(s) IV Intermittent every 24 hours  dextrose 40% Gel 15 Gram(s) Oral once  dextrose 5%. 1000 milliLiter(s) (50 mL/Hr) IV Continuous <Continuous>  dextrose 5%. 1000 milliLiter(s) (100 mL/Hr) IV Continuous <Continuous>  dextrose 50% Injectable 25 Gram(s) IV Push once  dextrose 50% Injectable 12.5 Gram(s) IV Push once  dextrose 50% Injectable 25 Gram(s) IV Push once  enoxaparin Injectable 60 milliGRAM(s) SubCutaneous every 12 hours  glucagon  Injectable 1 milliGRAM(s) IntraMuscular once  insulin lispro (ADMELOG) corrective regimen sliding scale   SubCutaneous three times a day before meals  insulin lispro (ADMELOG) corrective regimen sliding scale   SubCutaneous at bedtime  metFORMIN 1000 milliGRAM(s) Oral every 12 hours  senna 2 Tablet(s) Oral at bedtime  zinc sulfate 220 milliGRAM(s) Oral daily    MEDICATIONS  (PRN):  acetaminophen   Tablet .. 650 milliGRAM(s) Oral every 4 hours PRN Temp greater or equal to 38.5C (101.3F)  magnesium hydroxide Suspension 30 milliLiter(s) Oral daily PRN Constipation  melatonin 3 milliGRAM(s) Oral once PRN Insomnia      Allergies    Allergy Status Unknown    Intolerances        REVIEW OF SYSTEMS:  CONSTITUTIONAL: No fever, weight loss, or fatigue  EYES: No eye pain, visual disturbances, or discharge  ENMT:  No difficulty hearing, tinnitus, vertigo; No sinus or throat pain  NECK: No pain or stiffness  BREASTS: No pain, masses, or nipple discharge  RESPIRATORY: No cough, wheezing, chills or hemoptysis; No shortness of breath  CARDIOVASCULAR: No chest pain, palpitations, dizziness, or leg swelling  GASTROINTESTINAL: No abdominal or epigastric pain. No nausea, vomiting, or hematemesis; No diarrhea or constipation. No melena or hematochezia.  GENITOURINARY: No dysuria, frequency, hematuria, or incontinence  NEUROLOGICAL: No headaches, memory loss, loss of strength, numbness, or tremors  SKIN: No itching, burning, rashes, or lesions   LYMPH NODES: No enlarged glands  ENDOCRINE: No heat or cold intolerance; No hair loss  MUSCULOSKELETAL: No joint pain or swelling; No muscle, back, or extremity pain  PSYCHIATRIC: No depression, anxiety, mood swings, or difficulty sleeping  HEME/LYMPH: No easy bruising, or bleeding gums  ALLERY AND IMMUNOLOGIC: No hives or eczema    Vital Signs Last 24 Hrs  T(C): 36.5 (03 May 2021 05:11), Max: 37.2 (02 May 2021 15:34)  T(F): 97.7 (03 May 2021 05:11), Max: 99 (02 May 2021 15:34)  HR: 86 (03 May 2021 05:11) (86 - 86)  BP: 106/60 (03 May 2021 05:11) (97/56 - 108/53)  BP(mean): --  RR: 20 (03 May 2021 09:50) (18 - 20)  SpO2: 90% (03 May 2021 09:50) (90% - 97%)    PHYSICAL EXAM:  GENERAL: NAD, well-groomed, well-developed  HEAD:  Atraumatic, Normocephalic  EYES: EOMI, PERRLA, conjunctiva and sclera clear  ENMT: No tonsillar erythema, exudates, or enlargement; Moist mucous membranes, Good dentition, No lesions  NECK: Supple, No JVD, Normal thyroid  NERVOUS SYSTEM:  Alert & Oriented X3, Good concentration; Motor Strength 5/5 B/L upper and lower extremities; DTRs 2+ intact and symmetric  CHEST/LUNG: Clear to percussion bilaterally;  few fine rales posteriorly, chronic.  HEART: Regular rate and rhythm; No murmurs, rubs, or gallops  ABDOMEN: Soft, Nontender, Nondistended; Bowel sounds present  EXTREMITIES:  2+ Peripheral Pulses, No clubbing, cyanosis, or edema  LYMPH: No lymphadenopathy noted  SKIN: No rashes or lesions    LABS:                        10.7   10.99 )-----------( 399      ( 03 May 2021 08:47 )             31.8     05-03    136  |  101  |  12  ----------------------------<  174<H>  3.9   |  30  |  0.51    Ca    8.7      03 May 2021 08:47          Urinalysis Basic - ( 02 May 2021 10:26 )    Color: Yellow / Appearance: Clear / S.015 / pH: x  Gluc: x / Ketone: Negative  / Bili: Negative / Urobili: Negative mg/dL   Blood: x / Protein: Negative mg/dL / Nitrite: Negative   Leuk Esterase: Moderate / RBC: 0-2 /HPF / WBC 11-25   Sq Epi: x / Non Sq Epi: Moderate / Bacteria: Few      CAPILLARY BLOOD GLUCOSE      POCT Blood Glucose.: 202 mg/dL (03 May 2021 07:35)  POCT Blood Glucose.: 163 mg/dL (02 May 2021 21:44)  POCT Blood Glucose.: 206 mg/dL (02 May 2021 16:14)  POCT Blood Glucose.: 102 mg/dL (02 May 2021 10:54)                RADIOLOGY & ADDITIONAL TESTS:    Imaging Personally Reviewed:  [ ] YES  [ ] NO    Consultant(s) Notes Reviewed:  [ ] YES  [ ] NO    Care Discussed with Consultants/Other Providers [ ] YES  [ ] NO    Care discussed with family,         [  ]   yes  [  ]  No    imp:    stable[x ]    unstable[  ]     improving [   ]       unchanged  [  ]                Plans:  Continue present plans  [x  ] stable for discharge               New consult [  ]   specialty  .......               order test[  ]    test name.                  Discharge Planning  [  ]

## 2021-05-03 NOTE — DISCHARGE NOTE PROVIDER - NSDCCPCAREPLAN_GEN_ALL_CORE_FT
PRINCIPAL DISCHARGE DIAGNOSIS  Diagnosis: Hypoxia  Assessment and Plan of Treatment: 70 YOF with PMHx of DM p/w sob found to have COVID PNA. Admission prolonged due to titration off HFNC. Was inallly titrated to nasal cannula over the weekend. But pt had persistent leukocytosis and ID Dr Brownlee consulted. Pt found to have ecoli in urine, started on Rocephin now day 4. Pt wbc trending down. Pt to be discharged now on PO abx and home o2. Start eliquis 5mg BID x 30 days. Also d/c on metformin 1000mg BID and Januvia 50mg daily for dm management. Follow up with PMD 2 weeks and Dr Brownlee ID 2 weeks.      SECONDARY DISCHARGE DIAGNOSES  Diagnosis: COVID-19  Assessment and Plan of Treatment:

## 2021-05-03 NOTE — PROGRESS NOTE ADULT - TIME BILLING
case complexity
clinical exam, diccussion with Rn.
clinical exam, discussion with RN/ team
clinical eval, discussion with patient.
clinical eval,
reinier ecomplexity/ clinical eval/
reinier ecomplexity/ discussion with ID/RN
clinical exam, discussion with team
case complexity., lbs reviewed
clinical eval, lab eval, discussion with  ID and  Team.
case complexity, exam
clinical eval,  discussion with team,
clinical exam, discussion with patient. daughter
clinicl eval, discussion with team. , reassess diabetic therapy
clinical eval, discussion with patient. / team
clinical perry, coordination of care for discharge planning

## 2021-05-03 NOTE — DISCHARGE NOTE PROVIDER - PROVIDER TOKENS
FREE:[LAST:[PMD],PHONE:[(   )    -],FAX:[(   )    -],ADDRESS:[PMD],FOLLOWUP:[1 week]],PROVIDER:[TOKEN:[[id number]],FOLLOWUP:[1 week]]

## 2021-05-03 NOTE — PROGRESS NOTE ADULT - PROVIDER SPECIALTY LIST ADULT
Infectious Disease
Internal Medicine
Pulmonology
Internal Medicine
Internal Medicine
Pulmonology
Infectious Disease
Internal Medicine
Pulmonology
Pulmonology
Infectious Disease
Internal Medicine
Internal Medicine
Pulmonology
Internal Medicine

## 2021-05-03 NOTE — PROGRESS NOTE ADULT - ASSESSMENT
stable for discharge on home O2, 2l/NC  continue abx as per ID for UTI.  covid PNA=- recovering  Hypoxia  Diabtes- will discharge on  Metrformin 1000 mg bid and Januvia 50 daily  discharge on eliquis 5 mg bid for 30 days.

## 2021-05-03 NOTE — DISCHARGE NOTE PROVIDER - HOSPITAL COURSE
70 YOF with PMHx of DM p/w sob found to have COVID PNA. Admission prolonged due to titration off HFNC. Was inallly titrated to nasal cannula over the weekend. But pt had persistent leukocytosis and ID Dr Brownlee consulted. Pt found to have ecoli in urine, started on Rocephin now day 4. Pt wbc trending down. Pt to be discharged now on PO abx and home o2. Start eliquis 5mg BID x 30 days. Also d/c on metformin 1000mg BID and Januvia 50mg daily for dm management. Follow up with PMD 2 weeks and Dr Brownlee ID 2 weeks.      70 YOF with PMHx of DM p/w sob found to have COVID PNA. Admission prolonged due to titration off HFNC. Was inallly titrated to nasal cannula over the weekend. But pt had persistent leukocytosis and ID Dr Brownlee consulted. Pt found to have ecoli in urine, started on Rocephin now day 4. Pt wbc trending down. Pt to be discharged now on PO abx and home o2. Start eliquis 5mg BID x 30 days. Also d/c on metformin 1000mg BID and Januvia 50mg daily for dm management. Follow up with PMD 2 weeks and Dr Brownlee ID 2 weeks.   Complete Duracif 500mg BID x 4more days

## 2021-05-03 NOTE — DISCHARGE NOTE PROVIDER - CARE PROVIDER_API CALL
PMD,   PMD  Phone: (   )    -  Fax: (   )    -  Follow Up Time: 1 week    Ashley Ramires)  Internal Medicine  [address] [place] [id number]  Phone: [phone]  Fax: [phone]  Follow Up Time: 1 week

## 2021-05-03 NOTE — PROGRESS NOTE ADULT - NSICDXPILOT_GEN_ALL_CORE
[place]

## 2021-05-04 ENCOUNTER — TRANSCRIPTION ENCOUNTER (OUTPATIENT)
Age: 71
End: 2021-05-04

## 2021-05-05 ENCOUNTER — NON-APPOINTMENT (OUTPATIENT)
Age: 71
End: 2021-05-05

## 2021-05-05 PROBLEM — Z00.00 ENCOUNTER FOR PREVENTIVE HEALTH EXAMINATION: Status: ACTIVE | Noted: 2021-05-05

## 2021-05-05 LAB
CULTURE RESULTS: SIGNIFICANT CHANGE UP
CULTURE RESULTS: SIGNIFICANT CHANGE UP
SPECIMEN SOURCE: SIGNIFICANT CHANGE UP
SPECIMEN SOURCE: SIGNIFICANT CHANGE UP

## 2021-05-06 DIAGNOSIS — R79.1 ABNORMAL COAGULATION PROFILE: ICD-10-CM

## 2021-05-06 DIAGNOSIS — A41.9 SEPSIS, UNSPECIFIED ORGANISM: ICD-10-CM

## 2021-05-06 DIAGNOSIS — D72.829 ELEVATED WHITE BLOOD CELL COUNT, UNSPECIFIED: ICD-10-CM
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
## 2021-05-06 DIAGNOSIS — U07.1 COVID-19: ICD-10-CM

## 2021-05-06 DIAGNOSIS — I10 ESSENTIAL (PRIMARY) HYPERTENSION: ICD-10-CM

## 2021-05-06 DIAGNOSIS — B96.20 UNSPECIFIED ESCHERICHIA COLI [E. COLI] AS THE CAUSE OF DISEASES CLASSIFIED ELSEWHERE: ICD-10-CM

## 2021-05-06 DIAGNOSIS — T38.0X5A ADVERSE EFFECT OF GLUCOCORTICOIDS AND SYNTHETIC ANALOGUES, INITIAL ENCOUNTER: ICD-10-CM

## 2021-05-06 DIAGNOSIS — E11.9 TYPE 2 DIABETES MELLITUS WITHOUT COMPLICATIONS: ICD-10-CM

## 2021-05-06 DIAGNOSIS — J96.91 RESPIRATORY FAILURE, UNSPECIFIED WITH HYPOXIA: ICD-10-CM

## 2021-05-06 DIAGNOSIS — E87.6 HYPOKALEMIA: ICD-10-CM

## 2021-05-06 DIAGNOSIS — N39.0 URINARY TRACT INFECTION, SITE NOT SPECIFIED: ICD-10-CM

## 2021-05-06 DIAGNOSIS — J12.82 PNEUMONIA DUE TO CORONAVIRUS DISEASE 2019: ICD-10-CM

## 2021-05-07 ENCOUNTER — APPOINTMENT (OUTPATIENT)
Dept: CARE COORDINATION | Facility: HOME HEALTH | Age: 71
End: 2021-05-07
Payer: COMMERCIAL

## 2021-05-07 VITALS
HEART RATE: 86 BPM | SYSTOLIC BLOOD PRESSURE: 124 MMHG | RESPIRATION RATE: 17 BRPM | DIASTOLIC BLOOD PRESSURE: 70 MMHG | OXYGEN SATURATION: 92 %

## 2021-05-07 DIAGNOSIS — U07.1 COVID-19: ICD-10-CM

## 2021-05-07 DIAGNOSIS — E11.9 TYPE 2 DIABETES MELLITUS W/OUT COMPLICATIONS: ICD-10-CM

## 2021-05-07 PROCEDURE — 99348 HOME/RES VST EST LOW MDM 30: CPT

## 2021-05-07 RX ORDER — APIXABAN 5 MG/1
5 TABLET, FILM COATED ORAL TWICE DAILY
Refills: 0 | Status: ACTIVE | COMMUNITY
Start: 2021-05-07

## 2021-05-07 RX ORDER — METFORMIN HYDROCHLORIDE 1000 MG/1
1000 TABLET, COATED ORAL
Qty: 180 | Refills: 1 | Status: ACTIVE | COMMUNITY
Start: 2021-05-07

## 2021-05-07 RX ORDER — SITAGLIPTIN 50 MG/1
50 TABLET, FILM COATED ORAL DAILY
Refills: 0 | Status: ACTIVE | COMMUNITY
Start: 2021-05-07

## 2021-05-07 RX ORDER — CEFADROXIL 500 MG/1
500 CAPSULE ORAL TWICE DAILY
Qty: 8 | Refills: 0 | Status: ACTIVE | COMMUNITY
Start: 2021-05-07

## 2021-05-07 NOTE — PHYSICAL EXAM
[No Respiratory Distress] : no respiratory distress  [No Accessory Muscle Use] : no accessory muscle use [Clear to Auscultation] : lungs were clear to auscultation bilaterally [Normal] : normal rate, regular rhythm, normal S1 and S2 and no murmur heard [Pedal Pulses Present] : the pedal pulses are present [No Edema] : there was no peripheral edema [Soft] : abdomen soft [Non Tender] : non-tender [Non-distended] : non-distended [Normal Bowel Sounds] : normal bowel sounds [Normal Affect] : the affect was normal [Alert and Oriented x3] : oriented to person, place, and time [Normal Mood] : the mood was normal [de-identified] : diminished breathsounds b/l Lower lobes

## 2021-05-21 ENCOUNTER — NON-APPOINTMENT (OUTPATIENT)
Age: 71
End: 2021-05-21

## 2023-03-10 NOTE — PROGRESS NOTE ADULT - REASON FOR ADMISSION
covid PNA with Hypoxia
<<----- Click to add NO significant Past Surgical History
49

## 2023-04-24 NOTE — HISTORY OF PRESENT ILLNESS
Follow up with PCP if UTI does not resolve with today's treatment plan, if pain with urination is not better in 3 days, if you develop nausea and vomiting that lasts longer than 8 hours, If you have kidney or low back pain, if you see increasing blood in your urine, if you develop a fever of 101 or higher.  Call PCP if you have abdominal pain, loss of appetite, or fatigue.    Increase water intake until urine is pale yellow.    Drink at least 6 glasses of clear liquids a day.  Water is best.  Clear liquids include:  water, broth, Jell-O, popsicles, apple or grape juice, herbal tea.   Avoid milk, regular teas, and coffee.    Take all antibiotics as prescribed, complete al antibiotics even if you feel better.    May take probiotic or consume one yogurt daily to lessen the GI side effects form antibiotic.    Urinate frequently, as soon as you feel the urge to go empty your bladder at that time.    May take over the counter AZO per packaging for discomfort if not taking prescription medication for discomfort.  May use for first two days to help urinary burning discomfort. This may turn your urine orange.    May take Tylenol or Ibuprofen per packaging for discomfort.    Empty your bladder before and after sexual intercourse.     Avoid feminine hygiene sprays and douches.     Take showers instead of baths, do not take bubble baths.     If a Urine culture was sent to , [facility] will call you with results in 3-4 days.     If child, adolescent, or older than 55, or if diabetic, follow up with PCP in 1 week for further evaluation and possible further treatment of antibiotic therapy.     Follow-up care  Call your healthcare provider if all symptoms are not gone after 3 days of treatment. This is especially important if you have repeat infections.  If a culture was done, you will be told if your treatment needs to be changed.     Go to ER       If fever,        If flank pain       If kidney pain       If blood in urine        [FreeTextEntry1] : f/u hospitalization for COVID If nausea or vomiting       If having signs of dehydration      Call 911  Call 911 if any of the following occur:  Trouble breathing  Hard to wake up or confusion  Fainting or loss of consciousness  Rapid heart rate  When to seek medical advice  Call your healthcare provider right away if any of these occur:  Fever of 100.4ºF (38.0ºC) or higher, or as directed by your healthcare provider  Symptoms are not better by the third day of treatment  Back or abdominal pain that gets worse  Repeated vomiting, or unable to keep medicine down  Weakness or dizziness  Vaginal discharge  Pain, redness, or swelling in the outer vaginal area (labia)  PT. Verbalized understanding, and okay to leave message via phone with urine culture results.     Causes  Bladder infections are not contagious. You can't get one from someone else, from a toilet seat, or from sharing a bath.  The most common cause of bladder infections is bacteria from the bowels. The bacteria get onto the skin around the opening of the urethra. From there, they can get into the urine. Then they travel up to the bladder, causing inflammation and infection. This often happens because of:  Wiping incorrectly after urinating. Always wipe from front to back.  Bowel incontinence  Pregnancy  Procedures such as having a catheter put in  Older age  Not emptying your bladder. This can give bacteria a chance to grow in your urine.  Fluid loss (dehydration)  Constipation  Having sex  Using a diaphragm for birth control   Treatment  Bladder infections are diagnosed by a urine test and urine culture. They are treated with antibiotics. They often clear up quickly without problems. Treatment helps prevent a more serious kidney infection.  Medicines  Medicines can help in the treatment of a bladder infection:  Take antibiotics until they are used up, even if you feel better. It's important to finish them to make sure the infection has cleared.  You can use acetaminophen or ibuprofen  [de-identified] : 70 YOF with PMHx of DM p/w sob found to have COVID PNA. Patient d/c home with home o2\par COVID: denies SOB, CP fever, chills. Reports transient KHAN, +non productive cough at times. Compliant with regimen\par DM: denies any s/s, compliant with medications\par \par Patient to schedule appt with spouse's PCP- Dr Ellington [phone]\par  for pain, fever, or discomfort, unless another medicine was prescribed. If you have long-term (chronic) liver or kidney disease, talk with your healthcare provider before using these medicines. Also talk with your provider if you've ever had a stomach ulcer or GI (gastrointestinal) bleeding, or are taking blood-thinner medicines.  If you are given phenazopydridine to reduce burning with urination, it will make your urine a bright orange color. This can stain clothing.  Care and prevention  These self-care steps can help prevent future infections:  Drink plenty of fluids. This helps to prevent dehydration and flush out your bladder. Do this unless you must restrict fluids for other health reasons, or your healthcare provider told you not to.  Clean yourself correctly after going to the bathroom. Wipe from front to back after using the toilet. This helps prevent the spread of bacteria.  Urinate more often. Don't try to hold urine in for a long time.  Wear loose-fitting clothes and cotton underwear. Don't wear tight-fitting pants.  Improve your diet and prevent constipation. Eat more fresh fruits and vegetables, and fiber. Eat less junk foods and fatty foods.  Don't have sex until your symptoms are gone.  Don't have caffeine, alcohol, and spicy foods. These can irritate your bladder.  Urinate right after you have sex to flush out your bladder.  If you use birth control pills and have frequent bladder infections, discuss it with your healthcare provider.  Follow-up care  Call your healthcare provider if all symptoms are not gone after 3 days of treatment. This is especially important if you have repeat infections.  If a culture was done, you will be told if your treatment needs to be changed. If directed, you can call to find out the results.  If X-rays were done, you will be told if the results will affect your treatment.  Call 911  Call 911 if any of the following occur:  Trouble breathing  Hard to wake up or  confusion  Fainting (loss of consciousness)  Fast heart rate  When to get medical advice  Call your healthcare provider right away if any of these occur:  Fever of 100.4ºF (38.0ºC) or higher, or as directed by your healthcare provider  Symptoms are not better after 3 days of treatment  Back or belly pain that gets worse  Repeated vomiting, or unable to keep medicine down  Weakness or dizziness  Vaginal discharge  Pain, redness, or swelling in the outer vaginal area (labia        Instructions provided as documented in the AVS.

## 2023-06-05 NOTE — ED PROVIDER NOTE - NS_EDPROVIDERDISPOUSERTYPE_ED_A_ED
Attending Attestation (For Attendings USE Only)... Oral Minoxidil Counseling- I discussed with the patient the risks of oral minoxidil including but not limited to shortness of breath, swelling of the feet or ankles, dizziness, lightheadedness, unwanted hair growth and allergic reaction.  The patient verbalized understanding of the proper use and possible adverse effects of oral minoxidil.  All of the patient's questions and concerns were addressed.

## 2025-05-19 NOTE — ED ADULT NURSE NOTE - BREATH SOUNDS, LEFT
For information on Fall & Injury Prevention, visit: [email or web address] OR  [email or web address] OR  https://www.cdc.gov/steadi/patient.html
clear